# Patient Record
Sex: MALE | Race: WHITE | NOT HISPANIC OR LATINO | Employment: OTHER | URBAN - METROPOLITAN AREA
[De-identification: names, ages, dates, MRNs, and addresses within clinical notes are randomized per-mention and may not be internally consistent; named-entity substitution may affect disease eponyms.]

---

## 2021-03-22 ENCOUNTER — BULK ORDERING (OUTPATIENT)
Dept: CASE MANAGEMENT | Facility: OTHER | Age: 61
End: 2021-03-22

## 2021-03-22 DIAGNOSIS — Z23 IMMUNIZATION DUE: ICD-10-CM

## 2021-11-09 ENCOUNTER — OFFICE VISIT (OUTPATIENT)
Dept: FAMILY MEDICINE CLINIC | Facility: CLINIC | Age: 61
End: 2021-11-09

## 2021-11-09 VITALS
BODY MASS INDEX: 40.42 KG/M2 | HEIGHT: 72 IN | OXYGEN SATURATION: 96 % | SYSTOLIC BLOOD PRESSURE: 164 MMHG | TEMPERATURE: 99.5 F | HEART RATE: 93 BPM | DIASTOLIC BLOOD PRESSURE: 80 MMHG | WEIGHT: 298.4 LBS

## 2021-11-09 DIAGNOSIS — E78.5 HYPERLIPIDEMIA, UNSPECIFIED HYPERLIPIDEMIA TYPE: ICD-10-CM

## 2021-11-09 DIAGNOSIS — K63.5 POLYP OF COLON, UNSPECIFIED PART OF COLON, UNSPECIFIED TYPE: ICD-10-CM

## 2021-11-09 DIAGNOSIS — E11.65 UNCONTROLLED TYPE 2 DIABETES MELLITUS WITH HYPERGLYCEMIA (HCC): Primary | ICD-10-CM

## 2021-11-09 DIAGNOSIS — Z23 NEED FOR IMMUNIZATION AGAINST INFLUENZA: ICD-10-CM

## 2021-11-09 DIAGNOSIS — G89.29 CHRONIC LOW BACK PAIN, UNSPECIFIED BACK PAIN LATERALITY, UNSPECIFIED WHETHER SCIATICA PRESENT: ICD-10-CM

## 2021-11-09 DIAGNOSIS — M54.50 CHRONIC LOW BACK PAIN, UNSPECIFIED BACK PAIN LATERALITY, UNSPECIFIED WHETHER SCIATICA PRESENT: ICD-10-CM

## 2021-11-09 DIAGNOSIS — M10.9 GOUT, UNSPECIFIED CAUSE, UNSPECIFIED CHRONICITY, UNSPECIFIED SITE: ICD-10-CM

## 2021-11-09 DIAGNOSIS — Z11.59 NEED FOR HEPATITIS C SCREENING TEST: ICD-10-CM

## 2021-11-09 DIAGNOSIS — E66.01 CLASS 3 SEVERE OBESITY WITH SERIOUS COMORBIDITY AND BODY MASS INDEX (BMI) OF 40.0 TO 44.9 IN ADULT, UNSPECIFIED OBESITY TYPE (HCC): ICD-10-CM

## 2021-11-09 DIAGNOSIS — I10 PRIMARY HYPERTENSION: ICD-10-CM

## 2021-11-09 PROBLEM — M19.90 ARTHRITIS: Status: ACTIVE | Noted: 2021-11-09

## 2021-11-09 PROBLEM — K11.20 PAROTITIS: Status: ACTIVE | Noted: 2019-06-24

## 2021-11-09 PROBLEM — E66.9 OBESITY (BMI 30-39.9): Status: ACTIVE | Noted: 2021-11-09

## 2021-11-09 PROCEDURE — G0008 ADMIN INFLUENZA VIRUS VAC: HCPCS | Performed by: NURSE PRACTITIONER

## 2021-11-09 PROCEDURE — 90686 IIV4 VACC NO PRSV 0.5 ML IM: CPT | Performed by: NURSE PRACTITIONER

## 2021-11-09 PROCEDURE — 99204 OFFICE O/P NEW MOD 45 MIN: CPT | Performed by: NURSE PRACTITIONER

## 2021-11-09 RX ORDER — GLIMEPIRIDE 2 MG/1
2 TABLET ORAL
Qty: 180 TABLET | Refills: 1 | Status: SHIPPED | OUTPATIENT
Start: 2021-11-09 | End: 2022-06-28

## 2021-11-09 RX ORDER — ALLOPURINOL 300 MG/1
300 TABLET ORAL DAILY
COMMUNITY
Start: 2021-10-28 | End: 2021-11-09 | Stop reason: SDUPTHER

## 2021-11-09 RX ORDER — BENAZEPRIL HYDROCHLORIDE 20 MG/1
20 TABLET ORAL DAILY
COMMUNITY
End: 2021-11-09 | Stop reason: SDUPTHER

## 2021-11-09 RX ORDER — BENAZEPRIL HYDROCHLORIDE 20 MG/1
20 TABLET ORAL DAILY
Qty: 90 TABLET | Refills: 1 | Status: SHIPPED | OUTPATIENT
Start: 2021-11-09 | End: 2022-05-13

## 2021-11-09 RX ORDER — HYDROCHLOROTHIAZIDE 12.5 MG/1
12.5 CAPSULE, GELATIN COATED ORAL EVERY MORNING
Qty: 90 CAPSULE | Refills: 1 | Status: SHIPPED | OUTPATIENT
Start: 2021-11-09 | End: 2022-06-28

## 2021-11-09 RX ORDER — AMLODIPINE BESYLATE 10 MG/1
10 TABLET ORAL DAILY
Qty: 90 TABLET | Refills: 1 | Status: SHIPPED | OUTPATIENT
Start: 2021-11-09 | End: 2022-06-28

## 2021-11-09 RX ORDER — ALLOPURINOL 300 MG/1
300 TABLET ORAL DAILY
Qty: 90 TABLET | Refills: 1 | Status: SHIPPED | OUTPATIENT
Start: 2021-11-09 | End: 2022-06-28

## 2021-11-09 RX ORDER — NAPROXEN SODIUM 500 MG/1
500 TABLET, FILM COATED, EXTENDED RELEASE ORAL DAILY PRN
COMMUNITY
End: 2022-02-28 | Stop reason: SDUPTHER

## 2021-11-09 RX ORDER — CYCLOBENZAPRINE HCL 10 MG
10 TABLET ORAL 3 TIMES DAILY PRN
COMMUNITY
End: 2022-02-28 | Stop reason: SDUPTHER

## 2021-11-09 RX ORDER — SILDENAFIL 100 MG/1
100 TABLET, FILM COATED ORAL DAILY PRN
COMMUNITY
End: 2022-01-20 | Stop reason: SDUPTHER

## 2021-11-09 RX ORDER — MULTIPLE VITAMINS W/ MINERALS TAB 9MG-400MCG
1 TAB ORAL DAILY
COMMUNITY

## 2021-11-09 RX ORDER — HYDROCHLOROTHIAZIDE 12.5 MG/1
12.5 CAPSULE, GELATIN COATED ORAL EVERY MORNING
COMMUNITY
Start: 2021-09-21 | End: 2021-11-09 | Stop reason: SDUPTHER

## 2021-11-09 RX ORDER — AMLODIPINE BESYLATE 10 MG/1
10 TABLET ORAL DAILY
COMMUNITY
Start: 2021-09-21 | End: 2021-11-09 | Stop reason: SDUPTHER

## 2021-11-09 RX ORDER — GLIMEPIRIDE 2 MG/1
2 TABLET ORAL
COMMUNITY
Start: 2021-09-05 | End: 2021-11-09 | Stop reason: SDUPTHER

## 2021-11-09 RX ORDER — ASPIRIN 81 MG/1
81 TABLET ORAL DAILY
COMMUNITY

## 2021-11-09 NOTE — PATIENT INSTRUCTIONS
Resume Benazepril daily.  Continue to work on healthy diet and exercise.  Follow up pending lab results.  Follow up in 1 month for Medicare Wellness, or sooner if problems or concerns.

## 2021-11-09 NOTE — PROGRESS NOTES
Subjective   Chad Davidson Jr. is a 61 y.o. male.     Chief Complaint   Patient presents with   • Establish Care     new pt to [facility]   • Hypertension   • Diabetes       History of Present Illness   New patient, here to establish care; previous PCP retired; lives in [place].    F/U HTN: takes Amlodipine, Benazepril, and HCTZ daily; has been out of Benazepril for about 1 month; does not monitor BP; no headaches; no orthostasis; no swelling for most part, mild if has been on feet all day, resolved next morning.    F/U DM2: takes Metformin twice daily and Glimepiride twice daily; no recent A1c; will eat and then fast until blood sugar gets down to 150 before eats ago; watches intake of carbs/sugars some; active on farm, takes care of cows; has exercise bike, but has not used recently; mild numbness/tingling in feet; last eye exam about 3 years ago.     F/U gout: takes Allopurinol daily; no recent gout flares.    F/U lower back pain: takes Naproxen daily and Flexeril as needed for occasional back or neck pain and help; history of disc removed in past; no bladder or bowel dysfunction.    F/U ED: takes Viagra as needed; needs refill.    The following portions of the patient's history were reviewed and updated as appropriate: allergies, current medications, past family history, past medical history, past social history, past surgical history and problem list.      Current Outpatient Medications   Medication Sig Dispense Refill   • allopurinol (ZYLOPRIM) 300 MG tablet Take 1 tablet by mouth Daily. 90 tablet 1   • amLODIPine (NORVASC) 10 MG tablet Take 1 tablet by mouth Daily. 90 tablet 1   • aspirin (ASPIR) 81 MG EC tablet Take 81 mg by mouth Daily.     • benazepril (LOTENSIN) 20 MG tablet Take 1 tablet by mouth Daily. 90 tablet 1   • cyclobenzaprine (FLEXERIL) 10 MG tablet Take 10 mg by mouth 3 (Three) Times a Day As Needed for Muscle Spasms.     • glimepiride (AMARYL) 2 MG tablet Take 1 tablet by mouth 2 (Two)  Times a Day Before Meals. 180 tablet 1   • hydroCHLOROthiazide (MICROZIDE) 12.5 MG capsule Take 1 capsule by mouth Every Morning. 90 capsule 1   • metFORMIN (GLUCOPHAGE) 500 MG tablet Take 2 tablets by mouth 2 (Two) Times a Day With Meals. 360 tablet 1   • multivitamin with minerals tablet tablet Take 1 tablet by mouth Daily.     • naproxen (NAPRELAN) 500 MG 24 hr tablet Take 500 mg by mouth Daily As Needed for Mild Pain .     • sildenafil (VIAGRA) 100 MG tablet Take 100 mg by mouth Daily As Needed for Erectile Dysfunction.       No current facility-administered medications for this visit.       Past Medical History:   Diagnosis Date   • Diabetes mellitus (HCC)    • Hypertension    • Low back pain    • Parotitis 2019       Past Surgical History:   Procedure Laterality Date   • BACK SURGERY     • COLONOSCOPY  approx     2 polyps removed   • REPAIR PATELLAR TENDON Right    • REPLACEMENT TOTAL KNEE Bilateral        Family History   Problem Relation Age of Onset   • Diabetes Father    • Alcohol abuse Father    • Hepatitis Father    • Diabetes Sister        Social History     Socioeconomic History   • Marital status:    Tobacco Use   • Smoking status: Former Smoker     Packs/day: 2.50     Years: 20.00     Pack years: 50.00     Types: Cigarettes     Quit date:      Years since quittin.8   • Smokeless tobacco: Never Used   Vaping Use   • Vaping Use: Never used   Substance and Sexual Activity   • Alcohol use: Not Currently     Comment: no ETOH in last 5 years   • Drug use: Never   • Sexual activity: Yes       Review of Systems   Constitutional: Negative for appetite change, chills, fatigue, fever, unexpected weight gain and unexpected weight loss.   HENT: Negative for ear pain, sinus pressure, sore throat and trouble swallowing.    Eyes: Negative for blurred vision.   Respiratory: Negative for cough, chest tightness and shortness of breath.    Cardiovascular: Negative for chest pain and  "palpitations.   Gastrointestinal: Negative for abdominal pain (has hernia in abdomen), blood in stool (rare with hemorrhoid), constipation, diarrhea, GERD and indigestion.   Endocrine: Negative for cold intolerance, heat intolerance and polydipsia (rarely; drinks a lot of water).   Genitourinary: Negative for dysuria and frequency.   Musculoskeletal: Back pain: see HPI.   Skin: Negative for rash.   Neurological: Negative for syncope and weakness.   Hematological: Does not bruise/bleed easily.   Psychiatric/Behavioral: Negative for depressed mood. The patient is not nervous/anxious.        Objective   Vitals:    11/09/21 1512   BP: 164/80   Pulse: 93   Temp: 99.5 °F (37.5 °C)   TempSrc: Infrared   SpO2: 96%   Weight: 135 kg (298 lb 6.4 oz)   Height: 182.9 cm (72\")   PainSc: 0-No pain     Body mass index is 40.47 kg/m².    Physical Exam  Vitals and nursing note reviewed.   Constitutional:       General: He is not in acute distress.     Appearance: He is well-developed and well-groomed. He is not diaphoretic.   HENT:      Head: Normocephalic.      Right Ear: External ear normal.      Left Ear: External ear normal.   Eyes:      Conjunctiva/sclera: Conjunctivae normal.   Neck:      Vascular: No carotid bruit.   Cardiovascular:      Rate and Rhythm: Normal rate and regular rhythm.      Pulses: Normal pulses.           Dorsalis pedis pulses are 2+ on the right side and 2+ on the left side.        Posterior tibial pulses are 2+ on the right side and 2+ on the left side.      Heart sounds: Normal heart sounds. No murmur heard.      Pulmonary:      Effort: Pulmonary effort is normal. No respiratory distress.      Breath sounds: Normal breath sounds.   Abdominal:      General: Bowel sounds are normal.      Palpations: Abdomen is soft. There is no hepatomegaly or splenomegaly.      Tenderness: There is no abdominal tenderness. There is no guarding.      Hernia: A hernia is present. Hernia is present in the umbilical area " (reducible).   Musculoskeletal:      Cervical back: Normal range of motion and neck supple.      Right lower leg: No edema.      Left lower leg: No edema.      Right foot: Normal range of motion.      Left foot: Normal range of motion.        Feet:    Feet:      Right foot:      Protective Sensation: 10 sites tested. 10 sites sensed.      Skin integrity: Skin integrity normal.      Left foot:      Protective Sensation: 10 sites tested. 10 sites sensed.      Skin integrity: Skin integrity normal.      Comments: Diabetic Foot Exam Performed and Monofilament Test Performed    Skin:     General: Skin is warm and dry.      Findings: No rash.   Neurological:      Mental Status: He is alert and oriented to person, place, and time.      Gait: Gait is intact.   Psychiatric:         Mood and Affect: Mood normal.         Behavior: Behavior normal.         Thought Content: Thought content normal.         Cognition and Memory: Cognition normal.         Judgment: Judgment normal.         Lab Results   Component Value Date    WBC 9.06 06/24/2019    RBC 5.03 06/24/2019    HGB 15.4 06/24/2019    HCT 45.4 06/24/2019    MCV 90.3 06/24/2019    MCH 30.6 06/24/2019    MCHC 33.9 06/24/2019    RDW 13.3 06/24/2019    MPV 10.1 06/24/2019     06/24/2019    NEUTRORELPCT 68.4 06/24/2019    LYMPHORELPCT 17.4 06/24/2019    MONORELPCT 9.1 06/24/2019    EOSRELPCT 4.7 06/24/2019    BASORELPCT 0.2 06/24/2019    AUTOIGPER 0.2 (H) 06/24/2019    NEUTROABS 6.19 06/24/2019    LYMPHSABS 1.58 06/24/2019    MONOSABS 0.82 06/24/2019    EOSABS 0.43 06/24/2019    BASOSABS 0.02 06/24/2019    AUTOIGNUM 0.02 06/24/2019    NRBC 0 06/24/2019     Lab Results   Component Value Date    GLUCOSE 222 (H) 11/24/2015    BUN 16 06/24/2019    CREATININE 0.6 (L) 06/24/2019    BCR 26.7 06/24/2019    K 4.4 06/24/2019    CO2 21 (L) 06/24/2019    CALCIUM 9.0 06/24/2019    ALBUMIN 4.6 10/23/2015    AST 28 10/23/2015    ALT 44 (H) 10/23/2015      Lab Results   Component Value  Date    HGBA1C 9.3 (H) 06/24/2019         Assessment/Plan .  Problem List Items Addressed This Visit     Chronic low back pain    Current Assessment & Plan     Continue Naproxen daily as needed.  Continue Flexeril as needed.         Gout    Current Assessment & Plan     Stable on Allopurinol daily.         Relevant Medications    allopurinol (ZYLOPRIM) 300 MG tablet    Hyperlipidemia    Relevant Orders    Comprehensive Metabolic Panel    Lipid Panel With LDL / HDL Ratio    Hypertension    Current Assessment & Plan     Hypertension is increased today since has been out of Benazepril.  Regular aerobic exercise.  Continue current medications.  Ambulatory blood pressure monitoring.  Blood pressure will be reassessed in 4 weeks.  Resume Benazepril daily.  Continue Amlodipine and HCTZ daily.         Relevant Medications    amLODIPine (NORVASC) 10 MG tablet    benazepril (LOTENSIN) 20 MG tablet    hydroCHLOROthiazide (MICROZIDE) 12.5 MG capsule    Other Relevant Orders    Comprehensive Metabolic Panel    Lipid Panel With LDL / HDL Ratio    CBC & Differential    Class 3 severe obesity with serious comorbidity and body mass index (BMI) of 40.0 to 44.9 in adult (HCC)    Current Assessment & Plan     Patient's (Body mass index is 40.47 kg/m².) indicates that they are morbidly obese (BMI > 40 or > 35 with obesity - related health condition) with health conditions that include hypertension, diabetes mellitus and dyslipidemias . Weight is newly identified. BMI is is above average; BMI management plan is completed. We discussed portion control and increasing exercise.          Uncontrolled type 2 diabetes mellitus with hyperglycemia (HCC) - Primary    Current Assessment & Plan     Diabetes is likely not well controlled per patient.   Continue current treatment regimen.  Regular aerobic exercise.  Discussed ways to avoid symptomatic hypoglycemia.  Discussed foot care.  Reminded to get yearly retinal exam.  Diabetes will be  reassessed in 1 month.  Continue Metformin and Glimepiride twice daily.         Relevant Medications    glimepiride (AMARYL) 2 MG tablet    metFORMIN (GLUCOPHAGE) 500 MG tablet    Other Relevant Orders    Hemoglobin A1c    Comprehensive Metabolic Panel    Lipid Panel With LDL / HDL Ratio      Other Visit Diagnoses     Need for immunization against influenza        Relevant Orders    FluLaval/Fluarix/Fluzone >6 Months (1290-4919) (Completed)    Need for hepatitis C screening test        Relevant Orders    Hepatitis C Antibody    Polyp of colon, unspecified part of colon, unspecified type        Relevant Orders    Ambulatory Referral to Gastroenterology          Return in about 1 month (around 12/9/2021) for Medicare Wellness, Recheck.or sooner if problems or concerns.       COVID-19 Precautions - Patient was compliant in wearing a mask. When I saw the patient, I used appropriate personal protective equipment (PPE) including mask, gloves, and eye shield (standard procedure).  Hand hygiene was completed before and after seeing the patient.

## 2021-11-10 PROBLEM — E66.813 CLASS 3 SEVERE OBESITY WITH SERIOUS COMORBIDITY AND BODY MASS INDEX (BMI) OF 40.0 TO 44.9 IN ADULT: Status: ACTIVE | Noted: 2021-11-09

## 2021-11-10 PROBLEM — E66.01 CLASS 3 SEVERE OBESITY WITH SERIOUS COMORBIDITY AND BODY MASS INDEX (BMI) OF 40.0 TO 44.9 IN ADULT (HCC): Status: ACTIVE | Noted: 2021-11-09

## 2021-11-10 PROBLEM — K11.20 PAROTITIS: Status: RESOLVED | Noted: 2019-06-24 | Resolved: 2021-11-10

## 2021-11-10 LAB
ALBUMIN SERPL-MCNC: 4.7 G/DL (ref 3.8–4.8)
ALBUMIN/GLOB SERPL: 2.2 {RATIO} (ref 1.2–2.2)
ALP SERPL-CCNC: 90 IU/L (ref 44–121)
ALT SERPL-CCNC: 34 IU/L (ref 0–44)
AST SERPL-CCNC: 21 IU/L (ref 0–40)
BASOPHILS # BLD AUTO: 0.1 X10E3/UL (ref 0–0.2)
BASOPHILS NFR BLD AUTO: 1 %
BILIRUB SERPL-MCNC: 0.9 MG/DL (ref 0–1.2)
BUN SERPL-MCNC: 9 MG/DL (ref 8–27)
BUN/CREAT SERPL: 11 (ref 10–24)
CALCIUM SERPL-MCNC: 9.7 MG/DL (ref 8.6–10.2)
CHLORIDE SERPL-SCNC: 105 MMOL/L (ref 96–106)
CHOLEST SERPL-MCNC: 166 MG/DL (ref 100–199)
CO2 SERPL-SCNC: 23 MMOL/L (ref 20–29)
CREAT SERPL-MCNC: 0.79 MG/DL (ref 0.76–1.27)
EOSINOPHIL # BLD AUTO: 0.4 X10E3/UL (ref 0–0.4)
EOSINOPHIL NFR BLD AUTO: 7 %
ERYTHROCYTE [DISTWIDTH] IN BLOOD BY AUTOMATED COUNT: 13 % (ref 11.6–15.4)
GLOBULIN SER CALC-MCNC: 2.1 G/DL (ref 1.5–4.5)
GLUCOSE SERPL-MCNC: 169 MG/DL (ref 65–99)
HBA1C MFR BLD: 9.4 % (ref 4.8–5.6)
HCT VFR BLD AUTO: 47.5 % (ref 37.5–51)
HCV AB S/CO SERPL IA: <0.1 S/CO RATIO (ref 0–0.9)
HDLC SERPL-MCNC: 37 MG/DL
HGB BLD-MCNC: 16.5 G/DL (ref 13–17.7)
IMM GRANULOCYTES # BLD AUTO: 0 X10E3/UL (ref 0–0.1)
IMM GRANULOCYTES NFR BLD AUTO: 1 %
LDLC SERPL CALC-MCNC: 94 MG/DL (ref 0–99)
LDLC/HDLC SERPL: 2.5 RATIO (ref 0–3.6)
LYMPHOCYTES # BLD AUTO: 2 X10E3/UL (ref 0.7–3.1)
LYMPHOCYTES NFR BLD AUTO: 31 %
MCH RBC QN AUTO: 31.2 PG (ref 26.6–33)
MCHC RBC AUTO-ENTMCNC: 34.7 G/DL (ref 31.5–35.7)
MCV RBC AUTO: 90 FL (ref 79–97)
MONOCYTES # BLD AUTO: 0.5 X10E3/UL (ref 0.1–0.9)
MONOCYTES NFR BLD AUTO: 7 %
NEUTROPHILS # BLD AUTO: 3.4 X10E3/UL (ref 1.4–7)
NEUTROPHILS NFR BLD AUTO: 53 %
PLATELET # BLD AUTO: 391 X10E3/UL (ref 150–450)
POTASSIUM SERPL-SCNC: 4.5 MMOL/L (ref 3.5–5.2)
PROT SERPL-MCNC: 6.8 G/DL (ref 6–8.5)
RBC # BLD AUTO: 5.29 X10E6/UL (ref 4.14–5.8)
SODIUM SERPL-SCNC: 140 MMOL/L (ref 134–144)
TRIGL SERPL-MCNC: 201 MG/DL (ref 0–149)
VLDLC SERPL CALC-MCNC: 35 MG/DL (ref 5–40)
WBC # BLD AUTO: 6.4 X10E3/UL (ref 3.4–10.8)

## 2021-11-10 NOTE — ASSESSMENT & PLAN NOTE
Patient's (Body mass index is 40.47 kg/m².) indicates that they are morbidly obese (BMI > 40 or > 35 with obesity - related health condition) with health conditions that include hypertension, diabetes mellitus and dyslipidemias . Weight is newly identified. BMI is is above average; BMI management plan is completed. We discussed portion control and increasing exercise.

## 2021-11-10 NOTE — ASSESSMENT & PLAN NOTE
Diabetes is likely not well controlled per patient.   Continue current treatment regimen.  Regular aerobic exercise.  Discussed ways to avoid symptomatic hypoglycemia.  Discussed foot care.  Reminded to get yearly retinal exam.  Diabetes will be reassessed in 1 month.  Continue Metformin and Glimepiride twice daily.

## 2021-11-10 NOTE — ASSESSMENT & PLAN NOTE
Hypertension is increased today since has been out of Benazepril.  Regular aerobic exercise.  Continue current medications.  Ambulatory blood pressure monitoring.  Blood pressure will be reassessed in 4 weeks.  Resume Benazepril daily.  Continue Amlodipine and HCTZ daily.

## 2021-12-01 ENCOUNTER — IMMUNIZATION (OUTPATIENT)
Dept: VACCINE CLINIC | Facility: HOSPITAL | Age: 61
End: 2021-12-01

## 2021-12-01 PROCEDURE — 91300 HC SARSCOV02 VAC 30MCG/0.3ML IM: CPT | Performed by: INTERNAL MEDICINE

## 2021-12-01 PROCEDURE — 0004A HC ADM SARSCOV2 30MCG/0.3ML BOOSTER: CPT | Performed by: INTERNAL MEDICINE

## 2022-02-28 ENCOUNTER — OFFICE VISIT (OUTPATIENT)
Dept: FAMILY MEDICINE CLINIC | Facility: CLINIC | Age: 62
End: 2022-02-28

## 2022-02-28 VITALS
OXYGEN SATURATION: 99 % | WEIGHT: 299.8 LBS | HEIGHT: 72 IN | BODY MASS INDEX: 40.61 KG/M2 | TEMPERATURE: 97.3 F | HEART RATE: 94 BPM | SYSTOLIC BLOOD PRESSURE: 146 MMHG | DIASTOLIC BLOOD PRESSURE: 80 MMHG

## 2022-02-28 DIAGNOSIS — Z23 ENCOUNTER FOR IMMUNIZATION: ICD-10-CM

## 2022-02-28 DIAGNOSIS — L72.3 SEBACEOUS CYST: ICD-10-CM

## 2022-02-28 DIAGNOSIS — M54.50 CHRONIC LOW BACK PAIN, UNSPECIFIED BACK PAIN LATERALITY, UNSPECIFIED WHETHER SCIATICA PRESENT: ICD-10-CM

## 2022-02-28 DIAGNOSIS — E11.65 UNCONTROLLED TYPE 2 DIABETES MELLITUS WITH HYPERGLYCEMIA: ICD-10-CM

## 2022-02-28 DIAGNOSIS — Z12.5 PROSTATE CANCER SCREENING: ICD-10-CM

## 2022-02-28 DIAGNOSIS — E66.01 CLASS 3 SEVERE OBESITY WITH SERIOUS COMORBIDITY AND BODY MASS INDEX (BMI) OF 40.0 TO 44.9 IN ADULT, UNSPECIFIED OBESITY TYPE: ICD-10-CM

## 2022-02-28 DIAGNOSIS — M10.9 GOUT, UNSPECIFIED CAUSE, UNSPECIFIED CHRONICITY, UNSPECIFIED SITE: ICD-10-CM

## 2022-02-28 DIAGNOSIS — R06.83 SNORING: ICD-10-CM

## 2022-02-28 DIAGNOSIS — M19.90 ARTHRITIS: ICD-10-CM

## 2022-02-28 DIAGNOSIS — G89.29 CHRONIC LOW BACK PAIN, UNSPECIFIED BACK PAIN LATERALITY, UNSPECIFIED WHETHER SCIATICA PRESENT: ICD-10-CM

## 2022-02-28 DIAGNOSIS — I10 PRIMARY HYPERTENSION: ICD-10-CM

## 2022-02-28 DIAGNOSIS — K63.5 POLYP OF COLON, UNSPECIFIED PART OF COLON, UNSPECIFIED TYPE: ICD-10-CM

## 2022-02-28 DIAGNOSIS — K42.9 UMBILICAL HERNIA WITHOUT OBSTRUCTION AND WITHOUT GANGRENE: ICD-10-CM

## 2022-02-28 DIAGNOSIS — Z00.00 MEDICARE ANNUAL WELLNESS VISIT, INITIAL: Primary | ICD-10-CM

## 2022-02-28 PROCEDURE — 1170F FXNL STATUS ASSESSED: CPT | Performed by: NURSE PRACTITIONER

## 2022-02-28 PROCEDURE — G0009 ADMIN PNEUMOCOCCAL VACCINE: HCPCS | Performed by: NURSE PRACTITIONER

## 2022-02-28 PROCEDURE — 99214 OFFICE O/P EST MOD 30 MIN: CPT | Performed by: NURSE PRACTITIONER

## 2022-02-28 PROCEDURE — G0438 PPPS, INITIAL VISIT: HCPCS | Performed by: NURSE PRACTITIONER

## 2022-02-28 PROCEDURE — 1159F MED LIST DOCD IN RCRD: CPT | Performed by: NURSE PRACTITIONER

## 2022-02-28 PROCEDURE — 90732 PPSV23 VACC 2 YRS+ SUBQ/IM: CPT | Performed by: NURSE PRACTITIONER

## 2022-02-28 RX ORDER — NAPROXEN SODIUM 500 MG/1
500 TABLET, FILM COATED, EXTENDED RELEASE ORAL DAILY PRN
Qty: 90 TABLET | Refills: 0 | Status: SHIPPED | OUTPATIENT
Start: 2022-02-28 | End: 2022-11-02 | Stop reason: SINTOL

## 2022-02-28 RX ORDER — CYCLOBENZAPRINE HCL 10 MG
10 TABLET ORAL 3 TIMES DAILY PRN
Qty: 30 TABLET | Refills: 0 | Status: SHIPPED | OUTPATIENT
Start: 2022-02-28 | End: 2022-10-07

## 2022-02-28 NOTE — PROGRESS NOTES
The ABCs of the Annual Wellness Visit  Initial Medicare Wellness Visit    Chief Complaint   Patient presents with   • Annual Exam     mcwe      Subjective   History of Present Illness:  Chad Davidson Jr. is a 61 y.o. male who presents for an Initial Medicare Wellness Visit.  In addition, we addressed the following health issues:  F/U DM2: takes Metformin and Glimperide daily; last A1c 9.4%; monitors blood sugar, typically runs about 130-170s; will avoid eating if blood sugar is increased; blood sugar is improved if has been active walking; has been on Farxiga in past and worked well, but too expensive; no numbness or tingling; last eye exam about 3-4 years ago, needs new glasses.    F/U HTN: takes Amlodipine, Benazepril, and HCTZ daily; does not typically monitor BP; no headaches; no orthostasis; no swelling.    F/U gout: takes Allopurinol daily; no gout flares for long time.    F/U lower back pain: takes Naproxen daily and Flexeril as needed and helps; takes Flexeril rarely; no radiation of pain down legs; had back surgery in past; no weakness; no bladder or bowel dysfunction; Naproxen helps lower back and also helps hands; has arthritis in hands; has had bilateral knee surgery; works on a farm; needs refill of Naproxen and Flexeril.    Had loss of taste around Thanksgiving; no SOA, but felt sore all over; thinks may have had COVID-19 virus, but was never tested; symptoms resolved over 1-2 weeks.    Currently on disability for problems with legs.    The following portions of the patient's history were reviewed and   updated as appropriate: allergies, current medications, past family history, past medical history, past social history, past surgical history and problem list.     Compared to one year ago, the patient feels his physical   health is better.    Compared to one year ago, the patient feels his mental   health is the same.    Recent Hospitalizations:  He was not admitted to the hospital during the last  year.       Current Medical Providers:  Patient Care Team:  Eri David APRN as PCP - General (Family Medicine)    Outpatient Medications Prior to Visit   Medication Sig Dispense Refill   • allopurinol (ZYLOPRIM) 300 MG tablet Take 1 tablet by mouth Daily. 90 tablet 1   • amLODIPine (NORVASC) 10 MG tablet Take 1 tablet by mouth Daily. 90 tablet 1   • aspirin (ASPIR) 81 MG EC tablet Take 81 mg by mouth Daily.     • benazepril (LOTENSIN) 20 MG tablet Take 1 tablet by mouth Daily. 90 tablet 1   • glimepiride (AMARYL) 2 MG tablet Take 1 tablet by mouth 2 (Two) Times a Day Before Meals. 180 tablet 1   • hydroCHLOROthiazide (MICROZIDE) 12.5 MG capsule Take 1 capsule by mouth Every Morning. 90 capsule 1   • metFORMIN (GLUCOPHAGE) 500 MG tablet Take 2 tablets by mouth 2 (Two) Times a Day With Meals. 360 tablet 1   • multivitamin with minerals tablet tablet Take 1 tablet by mouth Daily.     • sildenafil (VIAGRA) 100 MG tablet Take 1 tablet by mouth Daily As Needed for Erectile Dysfunction. 20 tablet 0   • cyclobenzaprine (FLEXERIL) 10 MG tablet Take 10 mg by mouth 3 (Three) Times a Day As Needed for Muscle Spasms.     • naproxen (NAPRELAN) 500 MG 24 hr tablet Take 500 mg by mouth Daily As Needed for Mild Pain .       No facility-administered medications prior to visit.       No opioid medication identified on active medication list. I have reviewed chart for other potential  high risk medication/s and harmful drug interactions in the elderly.          Aspirin is on active medication list. Aspirin use is indicated based on review of current medical condition/s. Pros and cons of this therapy have been discussed today. Benefits of this medication outweigh potential harm.  Patient has been encouraged to continue taking this medication.  .      Patient Active Problem List   Diagnosis   • Arthritis   • Chronic low back pain   • Gout   • Hyperlipidemia   • Hypertension   • Class 3 severe obesity with serious comorbidity and  "body mass index (BMI) of 40.0 to 44.9 in adult ([facility])   • Uncontrolled type 2 diabetes mellitus with hyperglycemia ([facility])     Advance Care Planning  Advance Directive is not on file.  ACP discussion was held with the patient during this visit. Patient does not have an advance directive, information provided.    Review of Systems   Constitutional: Negative for appetite change, chills, fatigue and fever.   HENT: Negative for ear pain, sinus pressure, sore throat and trouble swallowing.    Eyes: Negative for discharge and visual disturbance.   Respiratory: Negative for cough, chest tightness and shortness of breath. Apnea: does snore.    Cardiovascular: Negative for chest pain (has had discomfort in right side of chest with pulling self up into deer stand, has resolved at this point) and palpitations.   Gastrointestinal: Negative for abdominal pain, blood in stool, constipation and diarrhea.   Endocrine: Negative for cold intolerance and heat intolerance. Polydipsia: drinks a lot of water anyway, not sure.   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 Negative for dysuria.   Musculoskeletal: Arthralgias: hands and knees; some in bilateral shoulders. Back pain: see HPI.   Skin: Negative for rash.   Neurological: Negative for syncope.   Hematological: Does not bruise/bleed easily.   Psychiatric/Behavioral: Negative for dysphoric mood. The patient is not nervous/anxious.         Objective       Vitals:    02/28/22 1041 02/28/22 1133   BP: 160/100 146/80   BP Location: Left arm Left arm   Patient Position: Sitting Sitting   Cuff Size: Adult    Pulse: 94    Temp: 97.3 °F (36.3 °C)    SpO2: 99%    Weight: 136 kg (299 lb 12.8 oz)    Height: 182.9 cm (72\")      BMI Readings from Last 1 Encounters:   02/28/22 40.66 kg/m²   BMI is above normal parameters. Recommendations include: exercise counseling and nutrition " counseling    Has not taken BP medications yet today.    Does the patient have evidence of cognitive impairment? No    Physical Exam  Vitals and nursing note reviewed.   Constitutional:       General: He is not in acute distress.     Appearance: He is well-developed and well-groomed. He is not ill-appearing or diaphoretic.   HENT:      Head: Normocephalic and atraumatic.      Jaw: No tenderness or pain on movement.      Right Ear: Tympanic membrane and external ear normal. No decreased hearing noted.      Left Ear: Tympanic membrane and external ear normal. No decreased hearing noted.      Nose: Nose normal.      Right Sinus: No maxillary sinus tenderness or frontal sinus tenderness.      Left Sinus: No maxillary sinus tenderness or frontal sinus tenderness.      Mouth/Throat:      Mouth: Mucous membranes are moist.      Pharynx: Uvula swelling present. No oropharyngeal exudate or posterior oropharyngeal erythema.   Eyes:      Extraocular Movements: Extraocular movements intact.      Conjunctiva/sclera: Conjunctivae normal.      Pupils: Pupils are equal, round, and reactive to light.   Neck:      Thyroid: No thyromegaly.      Vascular: No carotid bruit.      Trachea: No tracheal deviation.   Cardiovascular:      Rate and Rhythm: Normal rate and regular rhythm.      Pulses: Normal pulses.      Heart sounds: Normal heart sounds. No murmur heard.      Pulmonary:      Effort: Pulmonary effort is normal. No respiratory distress.      Breath sounds: Normal breath sounds.   Chest:      Chest wall: No tenderness.   Abdominal:      General: Bowel sounds are normal.      Palpations: Abdomen is soft. There is no hepatomegaly or splenomegaly.      Tenderness: There is no abdominal tenderness. There is no guarding.      Hernia: A hernia is present. Hernia is present in the umbilical area (reducible).   Musculoskeletal:         General: Normal range of motion.      Cervical back: Normal range of motion and neck supple. No bony  tenderness.      Thoracic back: No bony tenderness.      Lumbar back: No bony tenderness. Negative right straight leg raise test and negative left straight leg raise test.        Back:       Right hip: Normal range of motion.      Left hip: Normal range of motion.      Right lower leg: No edema.      Left lower leg: No edema.   Lymphadenopathy:      Cervical: No cervical adenopathy.   Skin:     General: Skin is warm and dry.      Findings: No rash.   Neurological:      Mental Status: He is alert and oriented to person, place, and time.      Cranial Nerves: No cranial nerve deficit.      Motor: Motor function is intact.      Coordination: Coordination normal.      Gait: Gait normal.      Deep Tendon Reflexes: Reflexes are normal and symmetric.   Psychiatric:         Mood and Affect: Mood normal.         Behavior: Behavior normal.         Thought Content: Thought content normal.         Cognition and Memory: Cognition normal.         Judgment: Judgment normal.               HEALTH RISK ASSESSMENT    Smoking Status:  Social History     Tobacco Use   Smoking Status Former Smoker   • Packs/day: 2.50   • Years: 20.00   • Pack years: 50.00   • Types: Cigarettes   • Quit date:    • Years since quittin.1   Smokeless Tobacco Never Used     Alcohol Consumption:  Social History     Substance and Sexual Activity   Alcohol Use Not Currently   • Alcohol/week: 0.0 standard drinks    Comment: no ETOH in last 5 years     Fall Risk Screen:    [facility] Fall Risk Assessment was completed, and patient is at LOW risk for falls.Assessment completed on:2022    Depression Screen:   PHQ-2/PHQ-9 Depression Screening 2022   Little interest or pleasure in doing things 0   Feeling down, depressed, or hopeless 0   Total Score 0       Health Habits and Functional and Cognitive Screening:  Functional & Cognitive Status 2022   Do you have difficulty preparing food and eating? No   Do you have difficulty bathing yourself, getting  dressed or grooming yourself? No   Do you have difficulty using the toilet? No   Do you have difficulty moving around from place to place? No   Do you have trouble with steps or getting out of a bed or a chair? No   Current Diet Limited Junk Food   Dental Exam Up to date   Eye Exam Not up to date   Exercise (times per week) 7 times per week   Current Exercises Include Walking   Do you need help using the phone?  No   Are you deaf or do you have serious difficulty hearing?  No   Do you need help with transportation? No   Do you need help shopping? No   Do you need help preparing meals?  No   Do you need help with housework?  No   Do you need help with laundry? No   Do you need help taking your medications? No   Do you need help managing money? No   Do you ever drive or ride in a car without wearing a seat belt? No   Have you felt unusual stress, anger or loneliness in the last month? No   Who do you live with? Alone   If you need help, do you have trouble finding someone available to you? No   Have you been bothered in the last four weeks by sexual problems? No   Do you have difficulty concentrating, remembering or making decisions? No       Age-appropriate Screening Schedule:  Refer to the list below for future screening recommendations based on patient's age, sex and/or medical conditions. Orders for these recommended tests are listed in the plan section. The patient has been provided with a written plan.    Health Maintenance   Topic Date Due   • URINE MICROALBUMIN  Never done   • ZOSTER VACCINE (1 of 2) Never done   • DIABETIC EYE EXAM  Never done   • HEMOGLOBIN A1C  05/09/2022   • DIABETIC FOOT EXAM  11/09/2022   • LIPID PANEL  11/09/2022   • TDAP/TD VACCINES (2 - Td or Tdap) 01/02/2023   • INFLUENZA VACCINE  Completed     Will consider Shingrix at pharmacy.       Thinks had Tdap in 8/29/2018      Assessment/Plan   CMS Preventative Services Quick Reference  Risk Factors Identified During Encounter  Immunizations  Discussed/Encouraged (specific Immunizations; Pneumococcal 23 and Shingrix  The above risks/problems have been discussed with the patient.    Discussed low risk for falls and recommended avoiding throw rugs, installing grab bars in bathroom, making sure have handrails on steps, etc.    Follow up actions/plans if indicated are seen below in the Assessment/Plan Section.  Pertinent information has been shared with the patient in the After Visit Summary.    Diagnoses and all orders for this visit:    1. Medicare annual wellness visit, initial (Primary)  -     Basic Metabolic Panel  -     Hemoglobin A1c    2. Uncontrolled type 2 diabetes mellitus with hyperglycemia (HCC)  Assessment & Plan:  Diabetes is pending lab results.   Continue current treatment regimen.  Regular aerobic exercise.  Reminded to get yearly retinal exam.  Diabetes will be reassessed in 3 months.  Continue Metformin and Glimepiride daily.    Orders:  -     Basic Metabolic Panel  -     Hemoglobin A1c  -     Microalbumin / Creatinine Urine Ratio - Urine, Clean Catch    3. Primary hypertension  Assessment & Plan:  Hypertension is improving with treatment.  Weight loss.  Regular aerobic exercise.  Continue current medications.  Ambulatory blood pressure monitoring.  Blood pressure will be reassessed in 3 months.  Continue Amlodipine, Benazepril, and HCTZ daily    Orders:  -     Basic Metabolic Panel    4. Chronic low back pain, unspecified back pain laterality, unspecified whether sciatica present  Assessment & Plan:  Continue Naproxen daily as needed and Flexeril as needed.    Orders:  -     naproxen (NAPRELAN) 500 MG 24 hr tablet; Take 1 tablet by mouth Daily As Needed for Mild Pain .  Dispense: 90 tablet; Refill: 0  -     cyclobenzaprine (FLEXERIL) 10 MG tablet; Take 1 tablet by mouth 3 (Three) Times a Day As Needed for Muscle Spasms.  Dispense: 30 tablet; Refill: 0    5. Arthritis  Assessment & Plan:  Continue Naproxen daily as needed.    Orders:  -      naproxen (NAPRELAN) 500 MG 24 hr tablet; Take 1 tablet by mouth Daily As Needed for Mild Pain .  Dispense: 90 tablet; Refill: 0    6. Gout, unspecified cause, unspecified chronicity, unspecified site  Assessment & Plan:  Stable on Allopurinol daily.    Orders:  -     Uric Acid    7. Prostate cancer screening  -     PSA Screen    8. Polyp of colon, unspecified part of colon, unspecified type  -     Ambulatory Referral to Gastroenterology    9. Snoring  -     Ambulatory Referral to Sleep Medicine    10. Encounter for immunization  -     Pneumococcal Polysaccharide Vaccine 23-Valent Greater Than or Equal To 3yo Subcutaneous / IM    11. Class 3 severe obesity with serious comorbidity and body mass index (BMI) of 40.0 to 44.9 in adult, unspecified obesity type (HCC)  Assessment & Plan:  Patient's (Body mass index is 40.66 kg/m².) indicates that they are morbidly obese (BMI > 40 or > 35 with obesity - related health condition) with health conditions that include hypertension and diabetes mellitus . Weight is unchanged. BMI is is above average; BMI management plan is completed. We discussed portion control and increasing exercise.       12. Umbilical hernia without obstruction and without gangrene  -     Ambulatory Referral to General Surgery    13. Sebaceous cyst  -     Ambulatory Referral to General Surgery      Follow Up:  Return in about 3 months (around 5/28/2022) for Recheck.or sooner if symptoms persist or worsen.    Will consider Farxiga or alternative if A1c still increased.    An After Visit Summary and PPPS were made available to the patient.

## 2022-02-28 NOTE — PATIENT INSTRUCTIONS
Continue to monitor your blood sugar once daily before a meal and record results.  Monitor your blood pressure periodically and record results.  Increase intake of protein and decrease intake of carbohydrates and sugars.  Continue to work on healthy diet and exercise.  Follow up pending lab results.  Follow up in 3 months, or sooner if symptoms persist or worsen.  Consider Shingrix vaccine at pharmacy.  Schedule an eye exam.      Medicare Wellness  Personal Prevention Plan of Service     Date of Office Visit:  2022  Encounter Provider:  AGUSTO Zuñiga  Place of Service:  [facility]  Patient Name: Chad Davidson Jr.  :  1960    As part of the Medicare Wellness portion of your visit today, we are providing you with this personalized preventive plan of services (PPPS). This plan is based upon recommendations of the [place] Preventive Services Task Force (USPSTF) and the Advisory Committee on Immunization Practices (ACIP).    This lists the preventive care services that should be considered, and provides dates of when you are due. Items listed as completed are up-to-date and do not require any further intervention.    Health Maintenance   Topic Date Due   • URINE MICROALBUMIN  Never done   • COLORECTAL CANCER SCREENING  Never done   • ZOSTER VACCINE (1 of 2) Never done   • DIABETIC EYE EXAM  Never done   • Pneumococcal Vaccine 0-64 (1 of 2 - PPSV23) 2023 (Originally 1966)   • HEMOGLOBIN A1C  2022   • DIABETIC FOOT EXAM  2022   • LIPID PANEL  2022   • TDAP/TD VACCINES (2 - Td or Tdap) 2023   • ANNUAL WELLNESS VISIT  2023   • HEPATITIS C SCREENING  Completed   • COVID-19 Vaccine  Completed   • INFLUENZA VACCINE  Completed       No orders of the defined types were placed in this encounter.      Return in about 3 months (around 2022) for Recheck.

## 2022-03-01 LAB
ALBUMIN/CREAT UR: 822 MG/G CREAT (ref 0–29)
BUN SERPL-MCNC: 13 MG/DL (ref 8–27)
BUN/CREAT SERPL: 16 (ref 10–24)
CALCIUM SERPL-MCNC: 9.8 MG/DL (ref 8.6–10.2)
CHLORIDE SERPL-SCNC: 102 MMOL/L (ref 96–106)
CO2 SERPL-SCNC: 20 MMOL/L (ref 20–29)
CREAT SERPL-MCNC: 0.8 MG/DL (ref 0.76–1.27)
CREAT UR-MCNC: 77.2 MG/DL
EGFR GENE MUT ANL BLD/T: 101 ML/MIN/1.73
GLUCOSE SERPL-MCNC: 298 MG/DL (ref 65–99)
HBA1C MFR BLD: 10 % (ref 4.8–5.6)
MICROALBUMIN UR-MCNC: 634.8 UG/ML
POTASSIUM SERPL-SCNC: 4.9 MMOL/L (ref 3.5–5.2)
PSA SERPL-MCNC: 0.7 NG/ML (ref 0–4)
SODIUM SERPL-SCNC: 138 MMOL/L (ref 134–144)
URATE SERPL-MCNC: 5.2 MG/DL (ref 3.8–8.4)

## 2022-03-03 DIAGNOSIS — R80.9 MICROALBUMINURIA: ICD-10-CM

## 2022-03-03 DIAGNOSIS — E11.65 UNCONTROLLED TYPE 2 DIABETES MELLITUS WITH HYPERGLYCEMIA: Primary | ICD-10-CM

## 2022-03-03 RX ORDER — DAPAGLIFLOZIN 10 MG/1
10 TABLET, FILM COATED ORAL DAILY
Qty: 30 TABLET | Refills: 2 | Status: SHIPPED | OUTPATIENT
Start: 2022-03-03 | End: 2022-11-02

## 2022-04-12 ENCOUNTER — APPOINTMENT (OUTPATIENT)
Dept: SLEEP MEDICINE | Facility: HOSPITAL | Age: 62
End: 2022-04-12

## 2022-05-13 DIAGNOSIS — I10 PRIMARY HYPERTENSION: ICD-10-CM

## 2022-05-13 RX ORDER — BENAZEPRIL HYDROCHLORIDE 20 MG/1
TABLET ORAL
Qty: 90 TABLET | Refills: 0 | Status: SHIPPED | OUTPATIENT
Start: 2022-05-13 | End: 2022-07-28

## 2022-05-13 RX ORDER — SILDENAFIL 100 MG/1
TABLET, FILM COATED ORAL
Qty: 20 TABLET | Refills: 0 | Status: SHIPPED | OUTPATIENT
Start: 2022-05-13 | End: 2022-07-28

## 2022-05-13 NOTE — TELEPHONE ENCOUNTER
Rx Refill Note  Requested Prescriptions     Pending Prescriptions Disp Refills   • sildenafil (VIAGRA) 100 MG tablet [Pharmacy Med Name: Sildenafil Citrate 100 MG Oral Tablet] 20 tablet 0     Sig: TAKE 1 TABLET BY MOUTH ONCE DAILY AS NEEDED FOR ERECTILE DYSFUNCTION   • benazepril (LOTENSIN) 20 MG tablet [Pharmacy Med Name: Benazepril HCl 20 MG Oral Tablet] 90 tablet 0     Sig: Take 1 tablet by mouth once daily      Last office visit with prescribing clinician: 2/28/2022      Next office visit with prescribing clinician: 6/6/2022            Cindy Berry MA  05/13/22, 09:42 EDT

## 2022-06-28 DIAGNOSIS — M10.9 GOUT, UNSPECIFIED CAUSE, UNSPECIFIED CHRONICITY, UNSPECIFIED SITE: ICD-10-CM

## 2022-06-28 DIAGNOSIS — E11.65 UNCONTROLLED TYPE 2 DIABETES MELLITUS WITH HYPERGLYCEMIA: ICD-10-CM

## 2022-06-28 DIAGNOSIS — I10 PRIMARY HYPERTENSION: ICD-10-CM

## 2022-06-28 RX ORDER — GLIMEPIRIDE 2 MG/1
TABLET ORAL
Qty: 180 TABLET | Refills: 0 | Status: SHIPPED | OUTPATIENT
Start: 2022-06-28 | End: 2022-09-26

## 2022-06-28 RX ORDER — ALLOPURINOL 300 MG/1
TABLET ORAL
Qty: 90 TABLET | Refills: 0 | Status: SHIPPED | OUTPATIENT
Start: 2022-06-28 | End: 2022-10-10 | Stop reason: SDUPTHER

## 2022-06-28 RX ORDER — HYDROCHLOROTHIAZIDE 12.5 MG/1
CAPSULE, GELATIN COATED ORAL
Qty: 90 CAPSULE | Refills: 0 | Status: SHIPPED | OUTPATIENT
Start: 2022-06-28 | End: 2022-10-10 | Stop reason: SDUPTHER

## 2022-06-28 RX ORDER — AMLODIPINE BESYLATE 10 MG/1
TABLET ORAL
Qty: 90 TABLET | Refills: 0 | Status: SHIPPED | OUTPATIENT
Start: 2022-06-28 | End: 2022-10-10 | Stop reason: SDUPTHER

## 2022-06-28 NOTE — TELEPHONE ENCOUNTER
Rx Refill Note  Requested Prescriptions     Pending Prescriptions Disp Refills   • metFORMIN (GLUCOPHAGE) 500 MG tablet [Pharmacy Med Name: metFORMIN HCl 500 MG Oral Tablet] 360 tablet 0     Sig: TAKE 2 TABLETS BY MOUTH TWICE DAILY WITH MEALS   • glimepiride (AMARYL) 2 MG tablet [Pharmacy Med Name: Glimepiride 2 MG Oral Tablet] 180 tablet 0     Sig: TAKE 1 TABLET BY MOUTH TWICE DAILY BEFORE MEAL(S)   • hydroCHLOROthiazide (MICROZIDE) 12.5 MG capsule [Pharmacy Med Name: hydroCHLOROthiazide 12.5 MG Oral Capsule] 90 capsule 0     Sig: Take 1 capsule by mouth once daily in the morning   • amLODIPine (NORVASC) 10 MG tablet [Pharmacy Med Name: amLODIPine Besylate 10 MG Oral Tablet] 90 tablet 0     Sig: Take 1 tablet by mouth once daily   • allopurinol (ZYLOPRIM) 300 MG tablet [Pharmacy Med Name: Allopurinol 300 MG Oral Tablet] 90 tablet 0     Sig: Take 1 tablet by mouth once daily      Last office visit with prescribing clinician: 2/28/2022      Next office visit with prescribing clinician: no appt found     Last refill   Metformin 11/09/2021  Amaryl 11/09/2021  Microzide 11/09/2021  Norvasc 11/09/2021  Zyloprim 11/09/2021                Herlinda Brandt MA  06/28/22, 10:39 EDT

## 2022-07-26 DIAGNOSIS — I10 PRIMARY HYPERTENSION: ICD-10-CM

## 2022-07-28 RX ORDER — SILDENAFIL 100 MG/1
TABLET, FILM COATED ORAL
Qty: 20 TABLET | Refills: 0 | Status: SHIPPED | OUTPATIENT
Start: 2022-07-28 | End: 2022-10-10 | Stop reason: SDUPTHER

## 2022-07-28 RX ORDER — BENAZEPRIL HYDROCHLORIDE 20 MG/1
TABLET ORAL
Qty: 30 TABLET | Refills: 0 | Status: SHIPPED | OUTPATIENT
Start: 2022-07-28 | End: 2022-08-04 | Stop reason: SDUPTHER

## 2022-07-28 NOTE — TELEPHONE ENCOUNTER
Rx Refill Note  Requested Prescriptions     Pending Prescriptions Disp Refills   • benazepril (LOTENSIN) 20 MG tablet [Pharmacy Med Name: Benazepril HCl 20 MG Oral Tablet] 90 tablet 0     Sig: Take 1 tablet by mouth once daily   • sildenafil (VIAGRA) 100 MG tablet [Pharmacy Med Name: Sildenafil Citrate 100 MG Oral Tablet] 20 tablet 0     Sig: TAKE 1 TABLET BY MOUTH ONCE DAILY AS NEEDED FOR ERECTILE DYSFUNCTION      Last office visit with prescribing clinician: 2/28/2022      Next office visit with prescribing clinician: Visit date not found

## 2022-07-28 NOTE — TELEPHONE ENCOUNTER
Called the patient to schedule a follow up for a medication refill. The mailbox was full.     OKAY FOR HUB TO READ AND SCHEDULE:    Please schedule the patient for an office visit for a follow up appointment.

## 2022-08-04 DIAGNOSIS — I10 PRIMARY HYPERTENSION: ICD-10-CM

## 2022-08-05 RX ORDER — BENAZEPRIL HYDROCHLORIDE 20 MG/1
20 TABLET ORAL DAILY
Qty: 30 TABLET | Refills: 0 | Status: SHIPPED | OUTPATIENT
Start: 2022-08-05 | End: 2022-10-10 | Stop reason: SDUPTHER

## 2022-08-05 NOTE — TELEPHONE ENCOUNTER
Rx Refill Note  Requested Prescriptions     Pending Prescriptions Disp Refills   • benazepril (LOTENSIN) 20 MG tablet 30 tablet 0     Sig: Take 1 tablet by mouth Daily.      Last office visit with prescribing clinician: 2/28/2022      Next office visit with prescribing clinician: Visit date not found  Last refill  07/28/2022

## 2022-09-26 DIAGNOSIS — E11.65 UNCONTROLLED TYPE 2 DIABETES MELLITUS WITH HYPERGLYCEMIA: ICD-10-CM

## 2022-09-26 RX ORDER — GLIMEPIRIDE 2 MG/1
TABLET ORAL
Qty: 60 TABLET | Refills: 0 | Status: SHIPPED | OUTPATIENT
Start: 2022-09-26 | End: 2022-11-02 | Stop reason: ALTCHOICE

## 2022-09-26 NOTE — TELEPHONE ENCOUNTER
Rx Refill Note  Requested Prescriptions     Pending Prescriptions Disp Refills   • glimepiride (AMARYL) 2 MG tablet [Pharmacy Med Name: Glimepiride 2 MG Oral Tablet] 180 tablet 0     Sig: TAKE 1 TABLET BY MOUTH TWICE DAILY BEFORE MEAL(S)      Last office visit with prescribing clinician: 2/28/2022      Next office visit with prescribing clinician: Visit date not found            Jatin Castelan, DIGNA/LMR  09/26/22, 15:58 EDT

## 2022-09-27 NOTE — TELEPHONE ENCOUNTER
OKAY FOR HUB TO READ AND SCHEDULE:    [facility] - Patient needs 6-8 month follow up from appointment in Feb. Please schedule his appointment.

## 2022-10-06 DIAGNOSIS — G89.29 CHRONIC LOW BACK PAIN, UNSPECIFIED BACK PAIN LATERALITY, UNSPECIFIED WHETHER SCIATICA PRESENT: ICD-10-CM

## 2022-10-06 DIAGNOSIS — M10.9 GOUT, UNSPECIFIED CAUSE, UNSPECIFIED CHRONICITY, UNSPECIFIED SITE: ICD-10-CM

## 2022-10-06 DIAGNOSIS — M54.50 CHRONIC LOW BACK PAIN, UNSPECIFIED BACK PAIN LATERALITY, UNSPECIFIED WHETHER SCIATICA PRESENT: ICD-10-CM

## 2022-10-06 DIAGNOSIS — I10 PRIMARY HYPERTENSION: ICD-10-CM

## 2022-10-06 DIAGNOSIS — E11.65 UNCONTROLLED TYPE 2 DIABETES MELLITUS WITH HYPERGLYCEMIA: ICD-10-CM

## 2022-10-07 RX ORDER — CYCLOBENZAPRINE HCL 10 MG
TABLET ORAL
Qty: 30 TABLET | Refills: 0 | Status: SHIPPED | OUTPATIENT
Start: 2022-10-07

## 2022-10-09 RX ORDER — AMLODIPINE BESYLATE 10 MG/1
TABLET ORAL
Qty: 90 TABLET | Refills: 0 | OUTPATIENT
Start: 2022-10-09

## 2022-10-09 RX ORDER — ALLOPURINOL 300 MG/1
TABLET ORAL
Qty: 90 TABLET | Refills: 0 | OUTPATIENT
Start: 2022-10-09

## 2022-10-09 RX ORDER — HYDROCHLOROTHIAZIDE 12.5 MG/1
CAPSULE, GELATIN COATED ORAL
Qty: 90 CAPSULE | Refills: 0 | OUTPATIENT
Start: 2022-10-09

## 2022-10-10 DIAGNOSIS — I10 PRIMARY HYPERTENSION: ICD-10-CM

## 2022-10-10 DIAGNOSIS — E11.65 UNCONTROLLED TYPE 2 DIABETES MELLITUS WITH HYPERGLYCEMIA: ICD-10-CM

## 2022-10-10 DIAGNOSIS — M10.9 GOUT, UNSPECIFIED CAUSE, UNSPECIFIED CHRONICITY, UNSPECIFIED SITE: ICD-10-CM

## 2022-10-10 RX ORDER — BENAZEPRIL HYDROCHLORIDE 20 MG/1
20 TABLET ORAL DAILY
Qty: 30 TABLET | Refills: 0 | Status: SHIPPED | OUTPATIENT
Start: 2022-10-10 | End: 2022-11-02 | Stop reason: SDUPTHER

## 2022-10-10 RX ORDER — SILDENAFIL 100 MG/1
100 TABLET, FILM COATED ORAL DAILY PRN
Qty: 20 TABLET | Refills: 0 | Status: SHIPPED | OUTPATIENT
Start: 2022-10-10 | End: 2022-11-02 | Stop reason: SDUPTHER

## 2022-10-10 RX ORDER — AMLODIPINE BESYLATE 10 MG/1
TABLET ORAL
Qty: 90 TABLET | Refills: 0 | OUTPATIENT
Start: 2022-10-10

## 2022-10-10 RX ORDER — GLIMEPIRIDE 2 MG/1
TABLET ORAL
Qty: 180 TABLET | Refills: 0 | OUTPATIENT
Start: 2022-10-10

## 2022-10-10 RX ORDER — HYDROCHLOROTHIAZIDE 12.5 MG/1
12.5 CAPSULE, GELATIN COATED ORAL EVERY MORNING
Qty: 30 CAPSULE | Refills: 0 | Status: SHIPPED | OUTPATIENT
Start: 2022-10-10 | End: 2022-11-02 | Stop reason: SDUPTHER

## 2022-10-10 RX ORDER — HYDROCHLOROTHIAZIDE 12.5 MG/1
CAPSULE, GELATIN COATED ORAL
Qty: 90 CAPSULE | Refills: 0 | OUTPATIENT
Start: 2022-10-10

## 2022-10-10 RX ORDER — ALLOPURINOL 300 MG/1
300 TABLET ORAL DAILY
Qty: 30 TABLET | Refills: 0 | Status: SHIPPED | OUTPATIENT
Start: 2022-10-10 | End: 2022-11-02 | Stop reason: SDUPTHER

## 2022-10-10 RX ORDER — GLIMEPIRIDE 2 MG/1
2 TABLET ORAL
Qty: 60 TABLET | Refills: 0 | OUTPATIENT
Start: 2022-10-10

## 2022-10-10 RX ORDER — BENAZEPRIL HYDROCHLORIDE 20 MG/1
TABLET ORAL
Qty: 90 TABLET | Refills: 0 | OUTPATIENT
Start: 2022-10-10

## 2022-10-10 RX ORDER — AMLODIPINE BESYLATE 10 MG/1
10 TABLET ORAL DAILY
Qty: 30 TABLET | Refills: 0 | Status: SHIPPED | OUTPATIENT
Start: 2022-10-10 | End: 2022-11-02 | Stop reason: SDUPTHER

## 2022-10-10 RX ORDER — ALLOPURINOL 300 MG/1
TABLET ORAL
Qty: 90 TABLET | Refills: 0 | OUTPATIENT
Start: 2022-10-10

## 2022-11-02 ENCOUNTER — OFFICE VISIT (OUTPATIENT)
Dept: FAMILY MEDICINE CLINIC | Facility: CLINIC | Age: 62
End: 2022-11-02

## 2022-11-02 VITALS
DIASTOLIC BLOOD PRESSURE: 70 MMHG | WEIGHT: 284.2 LBS | OXYGEN SATURATION: 100 % | SYSTOLIC BLOOD PRESSURE: 130 MMHG | BODY MASS INDEX: 38.49 KG/M2 | HEART RATE: 98 BPM | HEIGHT: 72 IN | TEMPERATURE: 98 F

## 2022-11-02 DIAGNOSIS — E78.2 MIXED HYPERLIPIDEMIA: ICD-10-CM

## 2022-11-02 DIAGNOSIS — E11.21 TYPE 2 DIABETES MELLITUS WITH DIABETIC NEPHROPATHY, WITHOUT LONG-TERM CURRENT USE OF INSULIN: ICD-10-CM

## 2022-11-02 DIAGNOSIS — G89.29 CHRONIC LOW BACK PAIN, UNSPECIFIED BACK PAIN LATERALITY, UNSPECIFIED WHETHER SCIATICA PRESENT: ICD-10-CM

## 2022-11-02 DIAGNOSIS — Z12.11 ENCOUNTER FOR SCREENING FOR MALIGNANT NEOPLASM OF COLON: ICD-10-CM

## 2022-11-02 DIAGNOSIS — I10 PRIMARY HYPERTENSION: ICD-10-CM

## 2022-11-02 DIAGNOSIS — E11.65 UNCONTROLLED TYPE 2 DIABETES MELLITUS WITH HYPERGLYCEMIA: Primary | ICD-10-CM

## 2022-11-02 DIAGNOSIS — N52.9 ERECTILE DYSFUNCTION, UNSPECIFIED ERECTILE DYSFUNCTION TYPE: ICD-10-CM

## 2022-11-02 DIAGNOSIS — E66.01 CLASS 2 SEVERE OBESITY WITH SERIOUS COMORBIDITY AND BODY MASS INDEX (BMI) OF 38.0 TO 38.9 IN ADULT, UNSPECIFIED OBESITY TYPE: ICD-10-CM

## 2022-11-02 DIAGNOSIS — M54.50 CHRONIC LOW BACK PAIN, UNSPECIFIED BACK PAIN LATERALITY, UNSPECIFIED WHETHER SCIATICA PRESENT: ICD-10-CM

## 2022-11-02 DIAGNOSIS — M10.9 GOUT, UNSPECIFIED CAUSE, UNSPECIFIED CHRONICITY, UNSPECIFIED SITE: ICD-10-CM

## 2022-11-02 PROCEDURE — 99215 OFFICE O/P EST HI 40 MIN: CPT | Performed by: NURSE PRACTITIONER

## 2022-11-02 RX ORDER — BENAZEPRIL HYDROCHLORIDE 20 MG/1
20 TABLET ORAL DAILY
Qty: 90 TABLET | Refills: 1 | Status: SHIPPED | OUTPATIENT
Start: 2022-11-02

## 2022-11-02 RX ORDER — HYDROCHLOROTHIAZIDE 12.5 MG/1
12.5 CAPSULE, GELATIN COATED ORAL EVERY MORNING
Qty: 90 CAPSULE | Refills: 1 | Status: SHIPPED | OUTPATIENT
Start: 2022-11-02

## 2022-11-02 RX ORDER — AMLODIPINE BESYLATE 10 MG/1
10 TABLET ORAL DAILY
Qty: 90 TABLET | Refills: 1 | Status: SHIPPED | OUTPATIENT
Start: 2022-11-02

## 2022-11-02 RX ORDER — GLIMEPIRIDE 2 MG/1
2 TABLET ORAL
Qty: 90 TABLET | Refills: 1 | Status: SHIPPED | OUTPATIENT
Start: 2022-11-02

## 2022-11-02 RX ORDER — GLIMEPIRIDE 4 MG/1
4 TABLET ORAL
Qty: 90 TABLET | Refills: 1 | Status: SHIPPED | OUTPATIENT
Start: 2022-11-02

## 2022-11-02 RX ORDER — ALLOPURINOL 300 MG/1
300 TABLET ORAL DAILY
Qty: 90 TABLET | Refills: 1 | Status: SHIPPED | OUTPATIENT
Start: 2022-11-02

## 2022-11-02 RX ORDER — SILDENAFIL 100 MG/1
100 TABLET, FILM COATED ORAL DAILY PRN
Qty: 30 TABLET | Refills: 0 | Status: SHIPPED | OUTPATIENT
Start: 2022-11-02

## 2022-11-02 NOTE — PATIENT INSTRUCTIONS
Increase Glimepiride to 4 mg with breakfast and 2 mg with evening meal.  May take Tylenol 1000 mg three times daily as needed.  Continue to monitor your blood sugar once daily before a meal and record results.  Continue to monitor your blood pressure periodically and record results.  Continue to work on healthy diet and exercise.  Follow up pending lab results.  Follow up in 3 months, or sooner if problems or concerns.

## 2022-11-02 NOTE — PROGRESS NOTES
Answers for HPI/ROS submitted by the patient on 11/2/2022  What is the primary reason for your visit?: Other  Please describe your symptoms.: No symptoms  Have you had these symptoms before?: Yes  How long have you been having these symptoms?: Greater than 2 weeks  Please describe any probable cause for these symptoms. : Need prescriptions refilled    Subjective   Chad Davidson Jr. is a 62 y.o. male.     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 Haq in [place], [place]; no diabetic retinopathy; declines statin.     F/U HTN: takes Amlodipine, Benazepril, and HCTZ daily; does not typically monitor BP; no headaches; no orthostasis; no swelling.     F/U gout: takes Allopurinol daily; no gout flares.    F/U microalbuminuria: renal stopped Naproxen due to elevated microalbumin; had follow up in September.    F/U lower back pain: takes Tylenol as needed and helps some; takes Tylenol 1000 mg daily as needed; takes Flexeril as needed for flares; has not taken Flexeril recently.    Needs repeat colonoscopy    Currently on disability for problems with back and legs; had discectomy in past.    The following portions of the patient's history were reviewed and updated as appropriate: allergies, current medications, past family history, past medical history, past social history, past surgical history and problem list.      Current Outpatient Medications   Medication Sig Dispense Refill   • allopurinol (ZYLOPRIM) 300 MG tablet Take 1 tablet by mouth Daily. 90  tablet 1   • amLODIPine (NORVASC) 10 MG tablet Take 1 tablet by mouth Daily. 90 tablet 1   • aspirin 81 MG EC tablet Take 81 mg by mouth Daily.     • benazepril (LOTENSIN) 20 MG tablet Take 1 tablet by mouth Daily. 90 tablet 1   • cyclobenzaprine (FLEXERIL) 10 MG tablet Take 1 tablet by mouth three times daily as needed for muscle spasm 30 tablet 0   • glimepiride (AMARYL) 2 MG tablet Take 1 tablet by mouth Daily With Dinner. 90 tablet 1   • hydroCHLOROthiazide (MICROZIDE) 12.5 MG capsule Take 1 capsule by mouth Every Morning. 90 capsule 1   • metFORMIN (GLUCOPHAGE) 500 MG tablet Take 2 tablets by mouth 2 (Two) Times a Day With Meals. 360 tablet 1   • multivitamin with minerals tablet tablet Take 1 tablet by mouth Daily.     • sildenafil (VIAGRA) 100 MG tablet Take 1 tablet by mouth Daily As Needed for Erectile Dysfunction. 30 tablet 0   • glimepiride (Amaryl) 4 MG tablet Take 1 tablet by mouth Every Morning Before Breakfast. 90 tablet 1     No current facility-administered medications for this visit.       Past Medical History:   Diagnosis Date   • Arthritis    • Brown recluse spider bite     with necrotizing fasciitis   • COVID-19 virus infection 2021   • Diabetes mellitus (HCC)    • Erectile dysfunction    • Hypertension    • Low back pain    • Parotitis 2019       Past Surgical History:   Procedure Laterality Date   • BACK SURGERY     • COLONOSCOPY  approx     2 polyps removed   • JOINT REPLACEMENT     • REPAIR PATELLAR TENDON Right    • REPLACEMENT TOTAL KNEE Bilateral        Family History   Problem Relation Age of Onset   • Diabetes Father    • Alcohol abuse Father    • Hepatitis Father    • Diabetes Sister        Social History     Socioeconomic History   • Marital status:    Tobacco Use   • Smoking status: Former     Packs/day: 2.50     Years: 20.00     Pack years: 50.00     Types: Cigarettes     Quit date: 1995     Years since quittin.8   • Smokeless tobacco: Never  "  Vaping Use   • Vaping Use: Never used   Substance and Sexual Activity   • Alcohol use: Not Currently     Alcohol/week: 0.0 standard drinks     Comment: no ETOH in last 5 years   • Drug use: Never   • Sexual activity: Yes       Review of Systems   Constitutional: Negative for appetite change, chills, fever, unexpected weight gain and unexpected weight loss. Fatigue: some.   HENT: Negative for ear pain, sinus pressure, sore throat and trouble swallowing.    Eyes: Negative for blurred vision.   Respiratory: Negative for cough, chest tightness and shortness of breath. Apnea: does snore; did not go for sleep study; declines at this time, will consider.    Cardiovascular: Negative for chest pain and palpitations.   Gastrointestinal: Negative for abdominal pain, blood in stool, constipation, diarrhea, GERD and indigestion (some if eats too much spicy food; takes TUMs as needed and helps).   Endocrine: Negative for polydipsia (drinks a lot of water).   Genitourinary: Positive for frequency (no change) and nocturia (gets up 2-3 times per night). Negative for dysuria.   Musculoskeletal: Back pain: see HPI.   Skin: Negative for rash.   Neurological: Negative for syncope and weakness.   Hematological: Does not bruise/bleed easily.   Psychiatric/Behavioral: Negative for depressed mood. The patient is not nervous/anxious.        Objective   Vitals:    11/02/22 1350 11/02/22 1445   BP: 150/86 130/70   BP Location: Left arm Left arm   Patient Position: Sitting Sitting   Cuff Size: Adult    Pulse: 98    Temp: 98 °F (36.7 °C)    SpO2: 100%    Weight: 129 kg (284 lb 3.2 oz)    Height: 182.9 cm (72\")      Body mass index is 38.54 kg/m².    Physical Exam  Vitals and nursing note reviewed.   Constitutional:       General: He is not in acute distress.     Appearance: He is well-developed and well-groomed. He is not diaphoretic.   HENT:      Head: Normocephalic.      Right Ear: Tympanic membrane and external ear normal. No decreased " hearing noted.      Left Ear: Tympanic membrane and external ear normal. No decreased hearing noted.      Nose: Nose normal.      Right Sinus: No maxillary sinus tenderness or frontal sinus tenderness.      Left Sinus: No maxillary sinus tenderness or frontal sinus tenderness.      Mouth/Throat:      Mouth: Mucous membranes are moist.      Pharynx: No oropharyngeal exudate or posterior oropharyngeal erythema.      Comments: Whitish lesion on left inner cheek; pt referred to oral surgery per dentist after OV last week  Eyes:      Conjunctiva/sclera: Conjunctivae normal.   Neck:      Vascular: No carotid bruit.   Cardiovascular:      Rate and Rhythm: Normal rate and regular rhythm.      Pulses: Normal pulses.           Dorsalis pedis pulses are 2+ on the right side and 2+ on the left side.        Posterior tibial pulses are 2+ on the right side and 2+ on the left side.      Heart sounds: Normal heart sounds. No murmur heard.  Pulmonary:      Effort: Pulmonary effort is normal. No respiratory distress.      Breath sounds: Normal breath sounds.   Abdominal:      General: Bowel sounds are normal.      Palpations: Abdomen is soft. There is no hepatomegaly or splenomegaly.      Tenderness: There is no abdominal tenderness. There is no guarding.   Musculoskeletal:      Cervical back: Normal range of motion and neck supple. No bony tenderness.      Thoracic back: No bony tenderness.      Lumbar back: No bony tenderness.      Right lower leg: No edema.      Left lower leg: No edema.        Feet:    Feet:      Right foot:      Protective Sensation: 10 sites tested. 10 sites sensed.      Skin integrity: Skin integrity normal.      Left foot:      Protective Sensation: 10 sites tested. 9 sites sensed.      Skin integrity: Skin integrity normal.      Comments: Diabetic Foot Exam Performed and Monofilament Test Performed    Lymphadenopathy:      Cervical: No cervical adenopathy.   Skin:     General: Skin is warm and dry.       Findings: No rash.   Neurological:      Mental Status: He is alert and oriented to person, place, and time.      Gait: Gait is intact.   Psychiatric:         Mood and Affect: Mood normal.         Behavior: Behavior normal.         Thought Content: Thought content normal.         Cognition and Memory: Cognition normal.         Judgment: Judgment normal.         Lab Results   Component Value Date    CHLPL 217 (H) 11/02/2022    TRIG 378 (H) 11/02/2022    HDL 34 (L) 11/02/2022    VLDL 66 (H) 11/02/2022     (H) 11/02/2022     Lab Results   Component Value Date    PSA 0.7 02/28/2022         Assessment    Problem List Items Addressed This Visit     Chronic low back pain    Current Assessment & Plan     Continue Tylenol as needed.  May take Tylenol 1000 mg three times daily as needed.           Gout    Current Assessment & Plan     Stable.  Continue Allopurinol daily.         Relevant Medications    allopurinol (ZYLOPRIM) 300 MG tablet    Other Relevant Orders    Uric Acid (Completed)    Hyperlipidemia    Current Assessment & Plan     Continue to work on healthy diet and exercise.         Relevant Orders    Comprehensive Metabolic Panel (Completed)    Lipid Panel With LDL / HDL Ratio (Completed)    Hypertension    Current Assessment & Plan     Hypertension is stable.  Regular aerobic exercise.  Continue current medications.  Ambulatory blood pressure monitoring.  Blood pressure will be reassessed in 3 months.  Continue Amlodipine, Benazepril, and HCTZ daily.         Relevant Medications    amLODIPine (NORVASC) 10 MG tablet    benazepril (LOTENSIN) 20 MG tablet    hydroCHLOROthiazide (MICROZIDE) 12.5 MG capsule    Other Relevant Orders    Comprehensive Metabolic Panel (Completed)    CBC & Differential (Completed)    Lipid Panel With LDL / HDL Ratio (Completed)    Class 2 severe obesity with serious comorbidity and body mass index (BMI) of 38.0 to 38.9 in adult (HCC)    Current Assessment & Plan     Patient's (Body  mass index is 38.54 kg/m².) indicates that they are morbidly obese (BMI > 40 or > 35 with obesity - related health condition) with health conditions that include hypertension and diabetes mellitus . Weight is improving with lifestyle modifications. BMI is is above average; BMI management plan is completed. We discussed low calorie, low carb based diet program, portion control and increasing exercise.          Uncontrolled type 2 diabetes mellitus with hyperglycemia (HCC) - Primary    Current Assessment & Plan     Diabetes is pending lab results.   Continue current treatment regimen.  Regular aerobic exercise.  Reminded to get yearly retinal exam.  Diabetes will be reassessed in 3 months.  Continue Metformin twice daily.  Increase Glimepiride to 4 mg with breakfast and 2 mg with evening meal.         Relevant Medications    glimepiride (Amaryl) 4 MG tablet    glimepiride (AMARYL) 2 MG tablet    metFORMIN (GLUCOPHAGE) 500 MG tablet    Other Relevant Orders    Comprehensive Metabolic Panel (Completed)    Lipid Panel With LDL / HDL Ratio (Completed)    Hemoglobin A1c (Completed)    Type 2 diabetes mellitus with diabetic nephropathy (HCC)    Current Assessment & Plan     Schedule a follow up appointment with renal.         Relevant Medications    glimepiride (Amaryl) 4 MG tablet    glimepiride (AMARYL) 2 MG tablet    metFORMIN (GLUCOPHAGE) 500 MG tablet    hydroCHLOROthiazide (MICROZIDE) 12.5 MG capsule    Erectile dysfunction    Relevant Medications    sildenafil (VIAGRA) 100 MG tablet   Other Visit Diagnoses     Encounter for screening for malignant neoplasm of colon        Relevant Orders    Ambulatory Referral to Gastroenterology          Return in about 3 months (around 2/2/2023) for Recheck.or sooner if symptoms persist or worsen.       I spent 45 minutes caring for Chad on this date of service. This time includes time spent by me in the following activities:reviewing tests, obtaining and/or reviewing a  separately obtained history, performing a medically appropriate examination and/or evaluation , counseling and educating the patient/family/caregiver, ordering medications, tests, or procedures and documenting information in the medical record    COVID-19 Precautions - Patient was compliant in wearing a mask. When I saw the patient, I used appropriate personal protective equipment (PPE) including mask, gloves, and eye shield (standard procedure).  Hand hygiene was completed before and after seeing the patient.

## 2022-11-03 PROBLEM — E66.812 CLASS 2 SEVERE OBESITY WITH SERIOUS COMORBIDITY AND BODY MASS INDEX (BMI) OF 38.0 TO 38.9 IN ADULT: Status: ACTIVE | Noted: 2021-11-09

## 2022-11-03 PROBLEM — N52.9 ERECTILE DYSFUNCTION: Status: ACTIVE | Noted: 2022-11-03

## 2022-11-03 LAB
ALBUMIN SERPL-MCNC: 4.8 G/DL (ref 3.8–4.8)
ALBUMIN/GLOB SERPL: 2 {RATIO} (ref 1.2–2.2)
ALP SERPL-CCNC: 103 IU/L (ref 44–121)
ALT SERPL-CCNC: 39 IU/L (ref 0–44)
AST SERPL-CCNC: 23 IU/L (ref 0–40)
BASOPHILS # BLD AUTO: 0.1 X10E3/UL (ref 0–0.2)
BASOPHILS NFR BLD AUTO: 1 %
BILIRUB SERPL-MCNC: 0.9 MG/DL (ref 0–1.2)
BUN SERPL-MCNC: 15 MG/DL (ref 8–27)
BUN/CREAT SERPL: 17 (ref 10–24)
CALCIUM SERPL-MCNC: 10.4 MG/DL (ref 8.6–10.2)
CHLORIDE SERPL-SCNC: 95 MMOL/L (ref 96–106)
CHOLEST SERPL-MCNC: 217 MG/DL (ref 100–199)
CO2 SERPL-SCNC: 22 MMOL/L (ref 20–29)
CREAT SERPL-MCNC: 0.88 MG/DL (ref 0.76–1.27)
EGFRCR SERPLBLD CKD-EPI 2021: 97 ML/MIN/1.73
EOSINOPHIL # BLD AUTO: 0.6 X10E3/UL (ref 0–0.4)
EOSINOPHIL NFR BLD AUTO: 8 %
ERYTHROCYTE [DISTWIDTH] IN BLOOD BY AUTOMATED COUNT: 12.8 % (ref 11.6–15.4)
GLOBULIN SER CALC-MCNC: 2.4 G/DL (ref 1.5–4.5)
GLUCOSE SERPL-MCNC: 288 MG/DL (ref 70–99)
HBA1C MFR BLD: 10.4 % (ref 4.8–5.6)
HCT VFR BLD AUTO: 52.5 % (ref 37.5–51)
HDLC SERPL-MCNC: 34 MG/DL
HGB BLD-MCNC: 17.6 G/DL (ref 13–17.7)
IMM GRANULOCYTES # BLD AUTO: 0 X10E3/UL (ref 0–0.1)
IMM GRANULOCYTES NFR BLD AUTO: 0 %
LDLC SERPL CALC-MCNC: 117 MG/DL (ref 0–99)
LDLC/HDLC SERPL: 3.4 RATIO (ref 0–3.6)
LYMPHOCYTES # BLD AUTO: 2.3 X10E3/UL (ref 0.7–3.1)
LYMPHOCYTES NFR BLD AUTO: 28 %
MCH RBC QN AUTO: 30 PG (ref 26.6–33)
MCHC RBC AUTO-ENTMCNC: 33.5 G/DL (ref 31.5–35.7)
MCV RBC AUTO: 90 FL (ref 79–97)
MONOCYTES # BLD AUTO: 0.6 X10E3/UL (ref 0.1–0.9)
MONOCYTES NFR BLD AUTO: 7 %
NEUTROPHILS # BLD AUTO: 4.6 X10E3/UL (ref 1.4–7)
NEUTROPHILS NFR BLD AUTO: 56 %
PLATELET # BLD AUTO: 420 X10E3/UL (ref 150–450)
POTASSIUM SERPL-SCNC: 4.5 MMOL/L (ref 3.5–5.2)
PROT SERPL-MCNC: 7.2 G/DL (ref 6–8.5)
RBC # BLD AUTO: 5.86 X10E6/UL (ref 4.14–5.8)
SODIUM SERPL-SCNC: 133 MMOL/L (ref 134–144)
TRIGL SERPL-MCNC: 378 MG/DL (ref 0–149)
URATE SERPL-MCNC: 4.7 MG/DL (ref 3.8–8.4)
VLDLC SERPL CALC-MCNC: 66 MG/DL (ref 5–40)
WBC # BLD AUTO: 8.2 X10E3/UL (ref 3.4–10.8)

## 2022-11-04 DIAGNOSIS — E11.65 UNCONTROLLED TYPE 2 DIABETES MELLITUS WITH HYPERGLYCEMIA: Primary | ICD-10-CM

## 2022-11-04 DIAGNOSIS — E11.21 TYPE 2 DIABETES MELLITUS WITH DIABETIC NEPHROPATHY, WITHOUT LONG-TERM CURRENT USE OF INSULIN: ICD-10-CM

## 2022-11-04 NOTE — ASSESSMENT & PLAN NOTE
Hypertension is stable.  Regular aerobic exercise.  Continue current medications.  Ambulatory blood pressure monitoring.  Blood pressure will be reassessed in 3 months.  Continue Amlodipine, Benazepril, and HCTZ daily.

## 2022-11-04 NOTE — ASSESSMENT & PLAN NOTE
Diabetes is pending lab results.   Continue current treatment regimen.  Regular aerobic exercise.  Reminded to get yearly retinal exam.  Diabetes will be reassessed in 3 months.  Continue Metformin twice daily.  Increase Glimepiride to 4 mg with breakfast and 2 mg with evening meal.

## 2022-11-04 NOTE — ASSESSMENT & PLAN NOTE
Patient's (Body mass index is 38.54 kg/m².) indicates that they are morbidly obese (BMI > 40 or > 35 with obesity - related health condition) with health conditions that include hypertension and diabetes mellitus . Weight is improving with lifestyle modifications. BMI is is above average; BMI management plan is completed. We discussed low calorie, low carb based diet program, portion control and increasing exercise.

## 2022-11-11 DIAGNOSIS — E83.52 HYPERCALCEMIA: Primary | ICD-10-CM

## 2022-11-11 DIAGNOSIS — R06.83 SNORING: ICD-10-CM

## 2023-04-13 DIAGNOSIS — E11.65 UNCONTROLLED TYPE 2 DIABETES MELLITUS WITH HYPERGLYCEMIA: ICD-10-CM

## 2023-04-13 DIAGNOSIS — I10 PRIMARY HYPERTENSION: ICD-10-CM

## 2023-04-13 DIAGNOSIS — N52.9 ERECTILE DYSFUNCTION, UNSPECIFIED ERECTILE DYSFUNCTION TYPE: ICD-10-CM

## 2023-04-13 RX ORDER — GLIMEPIRIDE 2 MG/1
TABLET ORAL
Qty: 90 TABLET | Refills: 0 | OUTPATIENT
Start: 2023-04-13

## 2023-04-13 RX ORDER — GLIMEPIRIDE 4 MG/1
TABLET ORAL
Qty: 90 TABLET | Refills: 0 | OUTPATIENT
Start: 2023-04-13

## 2023-04-13 RX ORDER — HYDROCHLOROTHIAZIDE 12.5 MG/1
CAPSULE, GELATIN COATED ORAL
Qty: 30 CAPSULE | Refills: 0 | OUTPATIENT
Start: 2023-04-13

## 2023-04-13 RX ORDER — AMLODIPINE BESYLATE 10 MG/1
TABLET ORAL
Qty: 30 TABLET | Refills: 0 | OUTPATIENT
Start: 2023-04-13

## 2023-04-13 RX ORDER — SILDENAFIL 100 MG/1
TABLET, FILM COATED ORAL
Qty: 30 TABLET | Refills: 0 | OUTPATIENT
Start: 2023-04-13

## 2023-04-13 NOTE — TELEPHONE ENCOUNTER
LOV             11/2/2022   NOV             Return in about 3 months (around 2/2/2023) for Recheck.    Last RF    11/2/22     From 11/2/22  Patient will need an appointment for additional refills    JN James/NATHAN

## 2023-05-03 ENCOUNTER — OFFICE VISIT (OUTPATIENT)
Dept: FAMILY MEDICINE CLINIC | Facility: CLINIC | Age: 63
End: 2023-05-03
Payer: MEDICARE

## 2023-05-03 VITALS
TEMPERATURE: 98.3 F | HEIGHT: 72 IN | SYSTOLIC BLOOD PRESSURE: 132 MMHG | OXYGEN SATURATION: 96 % | DIASTOLIC BLOOD PRESSURE: 72 MMHG | BODY MASS INDEX: 37.52 KG/M2 | HEART RATE: 105 BPM | WEIGHT: 277 LBS

## 2023-05-03 DIAGNOSIS — G89.29 CHRONIC LOW BACK PAIN, UNSPECIFIED BACK PAIN LATERALITY, UNSPECIFIED WHETHER SCIATICA PRESENT: ICD-10-CM

## 2023-05-03 DIAGNOSIS — E66.01 CLASS 2 SEVERE OBESITY WITH SERIOUS COMORBIDITY AND BODY MASS INDEX (BMI) OF 37.0 TO 37.9 IN ADULT, UNSPECIFIED OBESITY TYPE: ICD-10-CM

## 2023-05-03 DIAGNOSIS — E11.29 PERSISTENT PROTEINURIA DUE TO TYPE 2 DIABETES MELLITUS: ICD-10-CM

## 2023-05-03 DIAGNOSIS — M54.50 CHRONIC LOW BACK PAIN, UNSPECIFIED BACK PAIN LATERALITY, UNSPECIFIED WHETHER SCIATICA PRESENT: ICD-10-CM

## 2023-05-03 DIAGNOSIS — M10.9 GOUT, UNSPECIFIED CAUSE, UNSPECIFIED CHRONICITY, UNSPECIFIED SITE: ICD-10-CM

## 2023-05-03 DIAGNOSIS — Z12.11 COLON CANCER SCREENING: ICD-10-CM

## 2023-05-03 DIAGNOSIS — E11.65 UNCONTROLLED TYPE 2 DIABETES MELLITUS WITH HYPERGLYCEMIA: ICD-10-CM

## 2023-05-03 DIAGNOSIS — Z00.00 ENCOUNTER FOR MEDICARE ANNUAL WELLNESS EXAM: Primary | ICD-10-CM

## 2023-05-03 DIAGNOSIS — E78.2 MIXED HYPERLIPIDEMIA: ICD-10-CM

## 2023-05-03 DIAGNOSIS — I10 PRIMARY HYPERTENSION: ICD-10-CM

## 2023-05-03 DIAGNOSIS — E83.52 HYPERCALCEMIA: ICD-10-CM

## 2023-05-03 DIAGNOSIS — R80.9 PERSISTENT PROTEINURIA DUE TO TYPE 2 DIABETES MELLITUS: ICD-10-CM

## 2023-05-03 DIAGNOSIS — Z12.5 PROSTATE CANCER SCREENING: ICD-10-CM

## 2023-05-03 RX ORDER — GABAPENTIN 100 MG/1
100 CAPSULE ORAL 3 TIMES DAILY
COMMUNITY
Start: 2023-02-03

## 2023-05-03 NOTE — PROGRESS NOTES
The ABCs of the Annual Wellness Visit  Subsequent Medicare Wellness Visit    Alexei Davidson Jr. is a 62 y.o. male who presents for a Subsequent Medicare Wellness Visit.    The following portions of the patient's history were reviewed and   updated as appropriate: allergies, current medications, past family history, past medical history, past social history, past surgical history and problem list.    Compared to one year ago, the patient feels his physical   health is the same.    Compared to one year ago, the patient feels his mental   health is the same.    Recent Hospitalizations:  He was not admitted to the hospital during the last year.       Current Medical Providers:  Patient Care Team:  Eri David APRN as PCP - General (Family Medicine)  Jesus Haq OD (Optometry)  Jak Alvarez MD as Consulting Physician (Nephrology)    Outpatient Medications Prior to Visit   Medication Sig Dispense Refill   • gabapentin (NEURONTIN) 100 MG capsule Take 1 capsule by mouth 3 (Three) Times a Day.     • allopurinol (ZYLOPRIM) 300 MG tablet Take 1 tablet by mouth Daily. 90 tablet 1   • amLODIPine (NORVASC) 10 MG tablet Take 1 tablet by mouth Daily. 90 tablet 1   • benazepril (LOTENSIN) 20 MG tablet Take 1 tablet by mouth Daily. 90 tablet 1   • glimepiride (AMARYL) 2 MG tablet Take 1 tablet by mouth Daily With Dinner. 90 tablet 1   • glimepiride (Amaryl) 4 MG tablet Take 1 tablet by mouth Every Morning Before Breakfast. 90 tablet 1   • hydroCHLOROthiazide (MICROZIDE) 12.5 MG capsule Take 1 capsule by mouth Every Morning. 90 capsule 1   • metFORMIN (GLUCOPHAGE) 500 MG tablet Take 2 tablets by mouth 2 (Two) Times a Day With Meals. 360 tablet 1   • aspirin 81 MG EC tablet Take 81 mg by mouth Daily.     • cyclobenzaprine (FLEXERIL) 10 MG tablet Take 1 tablet by mouth three times daily as needed for muscle spasm 30 tablet 0   • multivitamin with minerals tablet tablet Take 1 tablet by mouth Daily.     •  "sildenafil (VIAGRA) 100 MG tablet Take 1 tablet by mouth Daily As Needed for Erectile Dysfunction. 30 tablet 0     No facility-administered medications prior to visit.       No opioid medication identified on active medication list. I have reviewed chart for other potential  high risk medication/s and harmful drug interactions in the elderly.          Aspirin is on active medication list. Aspirin use is indicated based on review of current medical condition/s. Pros and cons of this therapy have been discussed today. Benefits of this medication outweigh potential harm.  Patient has been encouraged to continue taking this medication.  .      Patient Active Problem List   Diagnosis   • Arthritis   • Chronic low back pain   • Gout   • Hyperlipidemia   • Hypertension   • Class 2 severe obesity with serious comorbidity and body mass index (BMI) of 37.0 to 37.9 in adult   • Uncontrolled type 2 diabetes mellitus with hyperglycemia   • Microalbuminuria   • Type 2 diabetes mellitus with diabetic nephropathy   • Erectile dysfunction   • Persistent proteinuria due to type 2 diabetes mellitus   • Hypercalcemia     Advance Care Planning   Advance Care Planning     Advance Directive is not on file.  ACP discussion was held with the patient during this visit. Patient does not have an advance directive, information provided.     Objective    Vitals:    05/03/23 1427   BP: 132/72   BP Location: Right arm   Patient Position: Sitting   Cuff Size: Large Adult   Pulse: 105   Temp: 98.3 °F (36.8 °C)   TempSrc: Temporal   SpO2: 96%   Weight: 126 kg (277 lb)   Height: 182.9 cm (72\")     Estimated body mass index is 37.57 kg/m² as calculated from the following:    Height as of this encounter: 182.9 cm (72\").    Weight as of this encounter: 126 kg (277 lb).    Class 2 Severe Obesity (BMI >=35 and <=39.9). Obesity-related health conditions include the following: hypertension, diabetes mellitus and dyslipidemias. Obesity is improving with " lifestyle modifications. BMI is is above average; BMI management plan is completed. We discussed low calorie, low carb based diet program, portion control and increasing exercise.      Does the patient have evidence of cognitive impairment? No    Lab Results   Component Value Date    CHLPL 199 2023    TRIG 184 (H) 2023    HDL 40 2023     (H) 2023    VLDL 33 2023    HGBA1C 9.5 (H) 2023        HEALTH RISK ASSESSMENT    Smoking Status:  Social History     Tobacco Use   Smoking Status Former   • Packs/day: 2.50   • Years: 20.00   • Pack years: 50.00   • Types: Cigarettes   • Quit date: 1995   • Years since quittin.3   Smokeless Tobacco Never     Alcohol Consumption:  Social History     Substance and Sexual Activity   Alcohol Use Not Currently    Comment: no ETOH in last 6 years     Fall Risk Screen:    [place] Fall Risk Assessment was completed, and patient is at MODERATE risk for falls. Assessment completed on:5/3/2023    Depression Screenin/3/2023     3:00 PM   PHQ-2/PHQ-9 Depression Screening   Little Interest or Pleasure in Doing Things 0-->not at all   Feeling Down, Depressed or Hopeless 0-->not at all   PHQ-9: Brief Depression Severity Measure Score 0       Health Habits and Functional and Cognitive Screenin/3/2023     3:00 PM   Functional & Cognitive Status   Do you have difficulty preparing food and eating? No   Do you have difficulty bathing yourself, getting dressed or grooming yourself? No   Do you have difficulty using the toilet? No   Do you have difficulty moving around from place to place? No   Do you have trouble with steps or getting out of a bed or a chair? No   Current Diet Well Balanced Diet   Dental Exam Up to date   Eye Exam Up to date   Exercise (times per week) 7 times per week   Current Exercises Include Other   Do you need help using the phone?  No   Are you deaf or do you have serious difficulty hearing?  No   Do you need  help with transportation? No   Do you need help shopping? No   Do you need help preparing meals?  No   Do you need help with housework?  No   Do you need help with laundry? No   Do you need help taking your medications? No   Do you need help managing money? No   Do you ever drive or ride in a car without wearing a seat belt? No   Have you felt unusual stress, anger or loneliness in the last month? No   Who do you live with? Alone   If you need help, do you have trouble finding someone available to you? No   Have you been bothered in the last four weeks by sexual problems? No   Do you have difficulty concentrating, remembering or making decisions? No       Age-appropriate Screening Schedule:  Refer to the list below for future screening recommendations based on patient's age, sex and/or medical conditions. Orders for these recommended tests are listed in the plan section. The patient has been provided with a written plan.    Health Maintenance   Topic Date Due   • COLORECTAL CANCER SCREENING  Never done   • ZOSTER VACCINE (1 of 2) Never done   • TDAP/TD VACCINES (2 - Td or Tdap) 01/02/2023   • DIABETIC EYE EXAM  03/16/2023   • COVID-19 Vaccine (4 - Booster for Pfizer series) 05/03/2024 (Originally 1/26/2022)   • Pneumococcal Vaccine 0-64 (2 - PCV) 05/03/2024 (Originally 2/28/2023)   • INFLUENZA VACCINE  08/01/2023   • DIABETIC FOOT EXAM  11/02/2023   • HEMOGLOBIN A1C  11/03/2023   • URINE MICROALBUMIN  03/09/2024   • ANNUAL WELLNESS VISIT  05/03/2024   • LIPID PANEL  05/03/2024   • HEPATITIS C SCREENING  Completed     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  Will consider Tdap and Shingrix vaccine at pharmacy.             CMS Preventative Services Quick Reference  Risk Factors Identified During Encounter  Fall Risk-High or Moderate: Discussed Fall Prevention in the home and Information on Fall Prevention Shared in After Visit Summary  Immunizations Discussed/Encouraged: Tdap,  Prevnar 20 (Pneumococcal 20-valent conjugate), Shingrix and COVID19  The above risks/problems have been discussed with the patient.    Discussed moderate risk for falls and recommended avoiding throw rugs, installing grab bars in bathroom, making sure have handrails on steps, etc.    Pertinent information has been shared with the patient in the After Visit Summary.  An After Visit Summary and PPPS were made available to the patient.    Follow Up:   Next Medicare Wellness visit to be scheduled in 1 year.     Additional E&M Note during same encounter follows:  Patient has multiple medical problems which are significant and separately identifiable that require additional work above and beyond the Medicare Wellness Visit.      Chief Complaint  Annual Exam    Subjective      HPI  Chad VIVIANE Davidson Jr. is also being seen today for follow up DM2: takes Metformin twice daily and Glimperide twice daily; last A1c 10.0%; could not afford Farxiga/Jardiance; has not been monitoring blood sugar recently; stays active on farm; watches carbs/sugars in diet; no numbness or tingling; had recent eye exam at Dr. Haq in [place], [place]; declines statin; has lost some weight.     F/U HTN: takes Amlodipine, Benazepril, and HCTZ daily; does not monitor BP; no headaches; no orthostasis; no swelling.     F/U gout: takes Allopurinol daily; no gout flares.     F/U microalbuminuria: renal stopped Naproxen due to elevated microalbumin; had recent follow up and recommended SGLT-2 but cannot afford; to follow up in 1 year.     F/U lower back pain: takes Tylenol as needed and helps some; takes Tylenol 1000 mg daily as needed; takes Flexeril as needed for flares; has not taken Flexeril recently.    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 Stroud and had removed; has follow up next month.     Needs repeat colonoscopy--needs referral.       Review of Systems   Constitutional: Negative for appetite change, chills, fatigue and fever.   HENT:  "Negative for ear pain, sinus pressure, sore throat and trouble swallowing.    Eyes: Negative for discharge and visual disturbance.   Respiratory: Negative for cough, chest tightness and shortness of breath. Apnea: declines sleep study.    Cardiovascular: Negative for chest pain and palpitations.   Gastrointestinal: Negative for abdominal pain, blood in stool, constipation and diarrhea.   Endocrine: Negative for cold intolerance, heat intolerance and polydipsia.   Genitourinary: Negative for dysuria. Frequency: at night.   Musculoskeletal: Arthralgias: has arthritis. Back pain: no change in chronic lower back pain, takes Tylenol as needed and helps some.   Skin: Negative for rash.   Neurological: Negative for syncope and weakness.   Hematological: Does not bruise/bleed easily.   Psychiatric/Behavioral: Negative for dysphoric mood. The patient is not nervous/anxious.        Objective   Vital Signs:  /72 (BP Location: Right arm, Patient Position: Sitting, Cuff Size: Large Adult)   Pulse 105   Temp 98.3 °F (36.8 °C) (Temporal)   Ht 182.9 cm (72\")   Wt 126 kg (277 lb)   SpO2 96%   BMI 37.57 kg/m²     Physical Exam  Vitals and nursing note reviewed.   Constitutional:       General: He is not in acute distress.     Appearance: He is well-developed and well-groomed. He is not diaphoretic.   HENT:      Head: Normocephalic and atraumatic.      Jaw: No tenderness or pain on movement.      Right Ear: Tympanic membrane and external ear normal. No decreased hearing noted.      Left Ear: Tympanic membrane and external ear normal. No decreased hearing noted.      Nose: Nose normal.      Right Sinus: No maxillary sinus tenderness or frontal sinus tenderness.      Left Sinus: No maxillary sinus tenderness or frontal sinus tenderness.      Mouth/Throat:      Mouth: Mucous membranes are moist.      Pharynx: No oropharyngeal exudate or posterior oropharyngeal erythema.   Eyes:      Extraocular Movements: Extraocular " movements intact.      Conjunctiva/sclera: Conjunctivae normal.      Pupils: Pupils are equal, round, and reactive to light.   Neck:      Thyroid: No thyromegaly.      Vascular: No carotid bruit.      Trachea: No tracheal deviation.   Cardiovascular:      Rate and Rhythm: Normal rate and regular rhythm.      Pulses: Normal pulses.      Heart sounds: Normal heart sounds. No murmur heard.  Pulmonary:      Effort: Pulmonary effort is normal. No respiratory distress.      Breath sounds: Normal breath sounds.   Abdominal:      General: Bowel sounds are normal.      Palpations: Abdomen is soft. There is no hepatomegaly or splenomegaly.      Tenderness: There is no abdominal tenderness. There is no guarding.      Hernia: A hernia is present. Hernia is present in the umbilical area (reducible; pt declines referral; ER warnings given).   Musculoskeletal:         General: Normal range of motion.      Cervical back: Normal range of motion and neck supple. No bony tenderness.      Thoracic back: No bony tenderness.      Lumbar back: No bony tenderness.      Right lower leg: No edema.      Left lower leg: No edema.   Lymphadenopathy:      Cervical: No cervical adenopathy.   Skin:     General: Skin is warm and dry.      Findings: No rash.   Neurological:      Mental Status: He is alert and oriented to person, place, and time.      Cranial Nerves: No cranial nerve deficit.      Motor: Motor function is intact.      Coordination: Coordination normal.      Gait: Gait normal.      Deep Tendon Reflexes: Reflexes are normal and symmetric.   Psychiatric:         Mood and Affect: Mood normal.         Behavior: Behavior normal.         Thought Content: Thought content normal.         Cognition and Memory: Cognition normal.         Judgment: Judgment normal.              1/30/23 BMP WNL except glucose 308  11/2/22 uric acid 4.7, A1c 10.4%; lipid panel: , Trig 378, HDL 34, ; CMP WNL except glucose 288, sodium 133, chloride 95,  calcium 10.4         Assessment and Plan  Diagnoses and all orders for this visit:    1. Encounter for Medicare annual wellness exam (Primary)  -     Comprehensive Metabolic Panel  -     Lipid Panel With LDL / HDL Ratio  -     Hemoglobin A1c  -     CBC & Differential    2. Uncontrolled type 2 diabetes mellitus with hyperglycemia  Assessment & Plan:  Diabetes is pending lab results.   Continue current treatment regimen.  Regular aerobic exercise.  Reminded to get yearly retinal exam.  Diabetes will be reassessed in 3 months.  Continue Metformin and Glimepiride twice daily.    Orders:  -     Comprehensive Metabolic Panel  -     Lipid Panel With LDL / HDL Ratio  -     Hemoglobin A1c    3. Primary hypertension  Assessment & Plan:  Hypertension is stable.  Regular aerobic exercise.  Continue current medications.  Ambulatory blood pressure monitoring.  Blood pressure will be reassessed in 3 months.  Continue Amlodipine, Benazepril, and HCTZ daily.    Orders:  -     Comprehensive Metabolic Panel  -     Lipid Panel With LDL / HDL Ratio  -     CBC & Differential    4. Mixed hyperlipidemia  Assessment & Plan:  Declines statin.  Continue to work on healthy diet and exercise.    Orders:  -     Comprehensive Metabolic Panel  -     Lipid Panel With LDL / HDL Ratio    5. Hypercalcemia  -     Comprehensive Metabolic Panel  -     Vitamin D,25-Hydroxy    6. Prostate cancer screening  -     PSA Screen    7. Colon cancer screening  -     Ambulatory Referral to Gastroenterology    8. Persistent proteinuria due to type 2 diabetes mellitus  Assessment & Plan:  Follow up as scheduled with renal.      9. Class 2 severe obesity with serious comorbidity and body mass index (BMI) of 37.0 to 37.9 in adult, unspecified obesity type  Assessment & Plan:  Patient's (Body mass index is 37.57 kg/m².) indicates that they are morbidly/severely obese (BMI > 40 or > 35 with obesity - related health condition) with health conditions that include  hypertension, diabetes mellitus and dyslipidemias . Weight is improving with lifestyle modifications. BMI  is above average; BMI management plan is completed. We discussed low calorie, low carb based diet program, portion control and increasing exercise.       10. Gout, unspecified cause, unspecified chronicity, unspecified site  Assessment & Plan:  Stable.  Continue Allopurinol daily.      11. Chronic low back pain, unspecified back pain laterality, unspecified whether sciatica present  Assessment & Plan:  Continue Tylenol as needed.      Other orders  -     PSA Screen           Follow Up  Return in about 4 months (around 9/3/2023) for Recheck. or sooner if problems or concerns.  Will send refills of medications to [facility] for 90 day supply pending lab results.    Patient was given instructions and counseling regarding his condition or for health maintenance advice. Please see specific information pulled into the AVS if appropriate.

## 2023-05-03 NOTE — PATIENT INSTRUCTIONS
Continue to monitor your blood sugar once daily before a meal and record results.  Continue to monitor your blood pressure periodically and record results.  Continue to work on healthy diet and exercise.  Follow up pending lab results.  Follow up in 4 months, or sooner if problems or concerns.       Medicare Wellness  Personal Prevention Plan of Service     Date of Office Visit:    Encounter Provider:  AGUSTO Castillo  Place of Service:  [facility]  Patient Name: Chad Davidson Jr.  :  1960    As part of the Medicare Wellness portion of your visit today, we are providing you with this personalized preventive plan of services (PPPS). This plan is based upon recommendations of the [place] Preventive Services Task Force (USPSTF) and the Advisory Committee on Immunization Practices (ACIP).    This lists the preventive care services that should be considered, and provides dates of when you are due. Items listed as completed are up-to-date and do not require any further intervention.    Health Maintenance   Topic Date Due    COLORECTAL CANCER SCREENING  Never done    ZOSTER VACCINE (1 of 2) Never done    TDAP/TD VACCINES (2 - Td or Tdap) 2023    ANNUAL WELLNESS VISIT  2023    DIABETIC EYE EXAM  2023    HEMOGLOBIN A1C  2023    COVID-19 Vaccine (4 - Booster for Pfizer series) 2024 (Originally 2022)    Pneumococcal Vaccine 0-64 (2 - PCV) 2024 (Originally 2023)    INFLUENZA VACCINE  2023    DIABETIC FOOT EXAM  2023    LIPID PANEL  2023    URINE MICROALBUMIN  2024    HEPATITIS C SCREENING  Completed       Orders Placed This Encounter   Procedures    Comprehensive Metabolic Panel     Order Specific Question:   Release to patient     Answer:   Routine Release    Lipid Panel With LDL / HDL Ratio     Order Specific Question:   Release to patient     Answer:   Routine Release    Hemoglobin A1c     Order Specific  Question:   Release to patient     Answer:   Routine Release    Vitamin D,25-Hydroxy     Order Specific Question:   Release to patient     Answer:   Routine Release    PSA Screen     Order Specific Question:   Release to patient     Answer:   Routine Release    CBC & Differential     Order Specific Question:   Manual Differential     Answer:   No       Return in about 4 months (around 9/3/2023) for Recheck.

## 2023-05-04 LAB
25(OH)D3+25(OH)D2 SERPL-MCNC: 38.9 NG/ML (ref 30–100)
ALBUMIN SERPL-MCNC: 4.7 G/DL (ref 3.8–4.8)
ALBUMIN/GLOB SERPL: 2 {RATIO} (ref 1.2–2.2)
ALP SERPL-CCNC: 101 IU/L (ref 44–121)
ALT SERPL-CCNC: 36 IU/L (ref 0–44)
AST SERPL-CCNC: 25 IU/L (ref 0–40)
BASOPHILS # BLD AUTO: 0.1 X10E3/UL (ref 0–0.2)
BASOPHILS NFR BLD AUTO: 1 %
BILIRUB SERPL-MCNC: 0.9 MG/DL (ref 0–1.2)
BUN SERPL-MCNC: 16 MG/DL (ref 8–27)
BUN/CREAT SERPL: 21 (ref 10–24)
CALCIUM SERPL-MCNC: 10.5 MG/DL (ref 8.6–10.2)
CHLORIDE SERPL-SCNC: 101 MMOL/L (ref 96–106)
CHOLEST SERPL-MCNC: 199 MG/DL (ref 100–199)
CO2 SERPL-SCNC: 22 MMOL/L (ref 20–29)
CREAT SERPL-MCNC: 0.77 MG/DL (ref 0.76–1.27)
EGFRCR SERPLBLD CKD-EPI 2021: 101 ML/MIN/1.73
EOSINOPHIL # BLD AUTO: 0.5 X10E3/UL (ref 0–0.4)
EOSINOPHIL NFR BLD AUTO: 6 %
ERYTHROCYTE [DISTWIDTH] IN BLOOD BY AUTOMATED COUNT: 13.3 % (ref 11.6–15.4)
GLOBULIN SER CALC-MCNC: 2.4 G/DL (ref 1.5–4.5)
GLUCOSE SERPL-MCNC: 168 MG/DL (ref 70–99)
HBA1C MFR BLD: 9.5 % (ref 4.8–5.6)
HCT VFR BLD AUTO: 48.9 % (ref 37.5–51)
HDLC SERPL-MCNC: 40 MG/DL
HGB BLD-MCNC: 17 G/DL (ref 13–17.7)
IMM GRANULOCYTES # BLD AUTO: 0 X10E3/UL (ref 0–0.1)
IMM GRANULOCYTES NFR BLD AUTO: 0 %
LDLC SERPL CALC-MCNC: 126 MG/DL (ref 0–99)
LDLC/HDLC SERPL: 3.2 RATIO (ref 0–3.6)
LYMPHOCYTES # BLD AUTO: 2.4 X10E3/UL (ref 0.7–3.1)
LYMPHOCYTES NFR BLD AUTO: 27 %
MCH RBC QN AUTO: 31.1 PG (ref 26.6–33)
MCHC RBC AUTO-ENTMCNC: 34.8 G/DL (ref 31.5–35.7)
MCV RBC AUTO: 90 FL (ref 79–97)
MONOCYTES # BLD AUTO: 0.6 X10E3/UL (ref 0.1–0.9)
MONOCYTES NFR BLD AUTO: 7 %
NEUTROPHILS # BLD AUTO: 5.3 X10E3/UL (ref 1.4–7)
NEUTROPHILS NFR BLD AUTO: 59 %
PLATELET # BLD AUTO: 429 X10E3/UL (ref 150–450)
POTASSIUM SERPL-SCNC: 4.4 MMOL/L (ref 3.5–5.2)
PROT SERPL-MCNC: 7.1 G/DL (ref 6–8.5)
PSA SERPL-MCNC: 0.7 NG/ML (ref 0–4)
RBC # BLD AUTO: 5.46 X10E6/UL (ref 4.14–5.8)
SODIUM SERPL-SCNC: 138 MMOL/L (ref 134–144)
TRIGL SERPL-MCNC: 184 MG/DL (ref 0–149)
VLDLC SERPL CALC-MCNC: 33 MG/DL (ref 5–40)
WBC # BLD AUTO: 9 X10E3/UL (ref 3.4–10.8)

## 2023-05-05 DIAGNOSIS — E11.65 UNCONTROLLED TYPE 2 DIABETES MELLITUS WITH HYPERGLYCEMIA: Primary | ICD-10-CM

## 2023-05-05 DIAGNOSIS — I10 PRIMARY HYPERTENSION: ICD-10-CM

## 2023-05-05 DIAGNOSIS — M10.9 GOUT, UNSPECIFIED CAUSE, UNSPECIFIED CHRONICITY, UNSPECIFIED SITE: ICD-10-CM

## 2023-05-05 DIAGNOSIS — E11.65 UNCONTROLLED TYPE 2 DIABETES MELLITUS WITH HYPERGLYCEMIA: ICD-10-CM

## 2023-05-05 RX ORDER — AMLODIPINE BESYLATE 10 MG/1
10 TABLET ORAL DAILY
Qty: 90 TABLET | Refills: 1 | Status: SHIPPED | OUTPATIENT
Start: 2023-05-05

## 2023-05-05 RX ORDER — HYDROCHLOROTHIAZIDE 12.5 MG/1
12.5 CAPSULE, GELATIN COATED ORAL EVERY MORNING
Qty: 90 CAPSULE | Refills: 1 | Status: SHIPPED | OUTPATIENT
Start: 2023-05-05

## 2023-05-05 RX ORDER — ALLOPURINOL 300 MG/1
300 TABLET ORAL DAILY
Qty: 90 TABLET | Refills: 1 | Status: SHIPPED | OUTPATIENT
Start: 2023-05-05

## 2023-05-05 RX ORDER — BENAZEPRIL HYDROCHLORIDE 20 MG/1
20 TABLET ORAL DAILY
Qty: 90 TABLET | Refills: 1 | Status: SHIPPED | OUTPATIENT
Start: 2023-05-05

## 2023-05-05 RX ORDER — GLIMEPIRIDE 4 MG/1
4 TABLET ORAL 2 TIMES DAILY WITH MEALS
Qty: 180 TABLET | Refills: 1 | Status: SHIPPED | OUTPATIENT
Start: 2023-05-05

## 2023-05-06 PROBLEM — E83.52 HYPERCALCEMIA: Status: ACTIVE | Noted: 2023-05-06

## 2023-05-06 PROBLEM — K13.79 ACQUIRED DYSPLASIA OF ORAL CAVITY: Status: RESOLVED | Noted: 2023-01-27 | Resolved: 2023-05-06

## 2023-05-06 PROBLEM — E11.29 PERSISTENT PROTEINURIA DUE TO TYPE 2 DIABETES MELLITUS: Status: ACTIVE | Noted: 2022-03-03

## 2023-05-06 PROBLEM — K13.79 ACQUIRED DYSPLASIA OF ORAL CAVITY: Status: ACTIVE | Noted: 2023-01-27

## 2023-05-06 PROBLEM — R80.9 PERSISTENT PROTEINURIA DUE TO TYPE 2 DIABETES MELLITUS: Status: ACTIVE | Noted: 2022-03-03

## 2023-05-06 NOTE — ASSESSMENT & PLAN NOTE
Diabetes is pending lab results.   Continue current treatment regimen.  Regular aerobic exercise.  Reminded to get yearly retinal exam.  Diabetes will be reassessed in 3 months.  Continue Metformin and Glimepiride twice daily.

## 2023-05-06 NOTE — ASSESSMENT & PLAN NOTE
Patient's (Body mass index is 37.57 kg/m².) indicates that they are morbidly/severely obese (BMI > 40 or > 35 with obesity - related health condition) with health conditions that include hypertension, diabetes mellitus and dyslipidemias . Weight is improving with lifestyle modifications. BMI  is above average; BMI management plan is completed. We discussed low calorie, low carb based diet program, portion control and increasing exercise.

## 2023-05-10 DIAGNOSIS — N52.9 ERECTILE DYSFUNCTION, UNSPECIFIED ERECTILE DYSFUNCTION TYPE: ICD-10-CM

## 2023-05-10 RX ORDER — SILDENAFIL 100 MG/1
100 TABLET, FILM COATED ORAL DAILY PRN
Qty: 30 TABLET | Refills: 0 | Status: SHIPPED | OUTPATIENT
Start: 2023-05-10

## 2023-05-10 NOTE — TELEPHONE ENCOUNTER
Rx Refill Note  Requested Prescriptions     Pending Prescriptions Disp Refills   • sildenafil (VIAGRA) 100 MG tablet 30 tablet 0     Sig: Take 1 tablet by mouth Daily As Needed for Erectile Dysfunction.      Last office visit with prescribing clinician: 5/3/2023   Last telemedicine visit with prescribing clinician: 5/10/2023   Next office visit with prescribing clinician: 9/8/2023                         Would you like a call back once the refill request has been completed: [] Yes [] No    If the office needs to give you a call back, can they leave a voicemail: [] Yes [] No    Cindy Berry MA  05/10/23, 16:24 EDT

## 2023-05-17 DIAGNOSIS — N52.9 ERECTILE DYSFUNCTION, UNSPECIFIED ERECTILE DYSFUNCTION TYPE: ICD-10-CM

## 2023-05-17 RX ORDER — SILDENAFIL 100 MG/1
100 TABLET, FILM COATED ORAL DAILY PRN
Qty: 30 TABLET | Refills: 0 | OUTPATIENT
Start: 2023-05-17

## 2023-09-18 DIAGNOSIS — N52.9 ERECTILE DYSFUNCTION, UNSPECIFIED ERECTILE DYSFUNCTION TYPE: ICD-10-CM

## 2023-09-18 RX ORDER — SILDENAFIL 100 MG/1
TABLET, FILM COATED ORAL
Qty: 30 TABLET | Refills: 0 | Status: SHIPPED | OUTPATIENT
Start: 2023-09-18

## 2023-09-18 NOTE — TELEPHONE ENCOUNTER
Rx Refill Note  Requested Prescriptions     Pending Prescriptions Disp Refills    sildenafil (VIAGRA) 100 MG tablet [Pharmacy Med Name: Sildenafil Citrate 100 MG Oral Tablet] 30 tablet 0     Sig: TAKE 1 TABLET BY MOUTH ONCE DAILY AS NEEDED FOR ERECTILE DYSFUNCTION      Last office visit with prescribing clinician: 5/3/2023   Last telemedicine visit with prescribing clinician: Visit date not found   Next office visit with prescribing clinician: 10/25/2023                         Would you like a call back once the refill request has been completed: [] Yes [] No    If the office needs to give you a call back, can they leave a voicemail: [] Yes [] No    Cindy Berry MA  09/18/23, 12:00 EDT    Patient needs a appointment will send in one more 30 day

## 2023-10-25 ENCOUNTER — OFFICE VISIT (OUTPATIENT)
Dept: FAMILY MEDICINE CLINIC | Facility: CLINIC | Age: 63
End: 2023-10-25
Payer: MEDICARE

## 2023-10-25 VITALS
OXYGEN SATURATION: 99 % | HEART RATE: 78 BPM | BODY MASS INDEX: 37.93 KG/M2 | WEIGHT: 280 LBS | SYSTOLIC BLOOD PRESSURE: 128 MMHG | HEIGHT: 72 IN | DIASTOLIC BLOOD PRESSURE: 70 MMHG

## 2023-10-25 DIAGNOSIS — R80.9 PERSISTENT PROTEINURIA DUE TO TYPE 2 DIABETES MELLITUS: ICD-10-CM

## 2023-10-25 DIAGNOSIS — G89.29 CHRONIC LOW BACK PAIN, UNSPECIFIED BACK PAIN LATERALITY, UNSPECIFIED WHETHER SCIATICA PRESENT: ICD-10-CM

## 2023-10-25 DIAGNOSIS — E66.01 CLASS 2 SEVERE OBESITY WITH SERIOUS COMORBIDITY AND BODY MASS INDEX (BMI) OF 38.0 TO 38.9 IN ADULT, UNSPECIFIED OBESITY TYPE: ICD-10-CM

## 2023-10-25 DIAGNOSIS — E11.29 PERSISTENT PROTEINURIA DUE TO TYPE 2 DIABETES MELLITUS: ICD-10-CM

## 2023-10-25 DIAGNOSIS — Z23 ENCOUNTER FOR IMMUNIZATION: ICD-10-CM

## 2023-10-25 DIAGNOSIS — M10.9 GOUT, UNSPECIFIED CAUSE, UNSPECIFIED CHRONICITY, UNSPECIFIED SITE: ICD-10-CM

## 2023-10-25 DIAGNOSIS — I10 PRIMARY HYPERTENSION: ICD-10-CM

## 2023-10-25 DIAGNOSIS — E83.52 HYPERCALCEMIA: ICD-10-CM

## 2023-10-25 DIAGNOSIS — E78.2 MIXED HYPERLIPIDEMIA: ICD-10-CM

## 2023-10-25 DIAGNOSIS — M54.50 CHRONIC LOW BACK PAIN, UNSPECIFIED BACK PAIN LATERALITY, UNSPECIFIED WHETHER SCIATICA PRESENT: ICD-10-CM

## 2023-10-25 DIAGNOSIS — E11.65 UNCONTROLLED TYPE 2 DIABETES MELLITUS WITH HYPERGLYCEMIA: Primary | ICD-10-CM

## 2023-10-25 NOTE — PROGRESS NOTES
Answers submitted by the patient for this visit:  Primary Reason for Visit (Submitted on 10/18/2023)  What is the primary reason for your visit?: Physical--too soon today    Subjective   Chad Davidson Jr. is a 63 y.o. male.     Chief Complaint   Patient presents with    Med Refill    Diabetes     Follow up    Hypertension     Follow up       History of Present Illness   Patient presents for follow up DM2: takes Metformin and Glimperide twice daily; last A1c 9.5%; increased dose of Glimepiride after last labs; could not afford Farxiga/Jardiance; monitors blood sugar on occasion, will fast if blood sugar gets up to 200; watches intake of carbs/sugars some; exercise helps blood sugar; [place] farm; no numbness/tingling; had recent eye exam at Dr. Haq in [place], [place].     F/U HTN: takes Amlodipine, Benazepril, and HCTZ daily; does not monitor BP; no headaches; no orthostasis; no swelling.    F/U Hypercalcemia: stopped taking MVI; needs repeat labs today.     F/U gout: takes Allopurinol daily; no gout flares for long time.     F/U microalbuminuria: renal stopped Naproxen due to elevated microalbumin; at last follow up recommended SGLT-2 but cannot afford; to follow up in March 2024.     F/U lower back pain: takes Tylenol as needed and helps some; takes Flexeril as needed for flares and helps; has not taken Flexeril recently.     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 Stroud and had removed about 10 months ago.     Needs repeat colonoscopy--declines until next year; no constipation or diarrhea; no blood in BM.       The following portions of the patient's history were reviewed and updated as appropriate: allergies, current medications, past family history, past medical history, past social history, past surgical history and problem list.    Current Outpatient Medications on File Prior to Visit   Medication Sig    allopurinol (ZYLOPRIM) 300 MG tablet Take 1 tablet by mouth Daily.    amLODIPine (NORVASC)  10 MG tablet Take 1 tablet by mouth Daily.    aspirin 81 MG EC tablet Take 1 tablet by mouth Daily.    benazepril (LOTENSIN) 20 MG tablet Take 1 tablet by mouth Daily.    cyclobenzaprine (FLEXERIL) 10 MG tablet Take 1 tablet by mouth three times daily as needed for muscle spasm    glimepiride (Amaryl) 4 MG tablet Take 1 tablet by mouth 2 (Two) Times a Day With Meals.    hydroCHLOROthiazide (MICROZIDE) 12.5 MG capsule Take 1 capsule by mouth Every Morning.    metFORMIN (GLUCOPHAGE) 500 MG tablet Take 2 tablets by mouth 2 (Two) Times a Day With Meals.    sildenafil (VIAGRA) 100 MG tablet TAKE 1 TABLET BY MOUTH ONCE DAILY AS NEEDED FOR ERECTILE DYSFUNCTION     No current facility-administered medications on file prior to visit.        Past Medical History:   Diagnosis Date    Acquired dysplasia of oral cavity 2023    Formatting of this note might be different from the original. Added automatically from request for surgery [id number]    Arthritis     Brown recluse spider bite     with necrotizing fasciitis    COVID-19 virus infection 2021    Diabetes mellitus     Erectile dysfunction     Hypertension     Low back pain     Parotitis 2019       Past Surgical History:   Procedure Laterality Date    BACK SURGERY      COLONOSCOPY  approx     2 polyps removed    JOINT REPLACEMENT      REPAIR PATELLAR TENDON Right     REPLACEMENT TOTAL KNEE Bilateral        Family History   Problem Relation Age of Onset    Diabetes Father     Alcohol abuse Father     Hepatitis Father     Diabetes Sister        Social History     Socioeconomic History    Marital status:    Tobacco Use    Smoking status: Former     Packs/day: 2.50     Years: 20.00     Additional pack years: 0.00     Total pack years: 50.00     Types: Cigarettes     Quit date: 1995     Years since quittin.8    Smokeless tobacco: Never   Vaping Use    Vaping Use: Never used   Substance and Sexual Activity    Alcohol use: Not Currently      "Comment: no ETOH in last 6 years    Drug use: Never    Sexual activity: Yes       Review of Systems   Constitutional:  Negative for appetite change, chills, fatigue, fever, unexpected weight gain and unexpected weight loss.   HENT:  Negative for ear pain, sinus pressure, sore throat and trouble swallowing.    Eyes:  Negative for blurred vision.   Respiratory:  Negative for cough, chest tightness and shortness of breath.    Cardiovascular:  Negative for chest pain and palpitations.   Endocrine: Negative for cold intolerance, heat intolerance and polydipsia.   Genitourinary:  Positive for frequency and nocturia (gets up twice per night to urinate; no change in urinary stream). Negative for dysuria.   Musculoskeletal:  Negative for arthralgias. Back pain: see HPI; will have flares when does too much.  Skin:  Negative for rash.   Neurological:  Negative for syncope and weakness.   Hematological:  Does not bruise/bleed easily.   Psychiatric/Behavioral:  Negative for depressed mood. The patient is not nervous/anxious.        Objective   Vitals:    10/25/23 1047   BP: 128/70   BP Location: Right arm   Patient Position: Sitting   Cuff Size: Adult   Pulse: 78   SpO2: 99%   Weight: 127 kg (280 lb)   Height: 182.9 cm (72\")     Body mass index is 37.97 kg/m².    Physical Exam  Vitals and nursing note reviewed.   Constitutional:       General: He is not in acute distress.     Appearance: He is well-developed and well-groomed. He is not diaphoretic.   HENT:      Head: Normocephalic.      Right Ear: External ear normal.      Left Ear: External ear normal.   Eyes:      Conjunctiva/sclera: Conjunctivae normal.   Neck:      Vascular: No carotid bruit.   Cardiovascular:      Rate and Rhythm: Normal rate and regular rhythm.      Pulses: Normal pulses.      Heart sounds: Normal heart sounds. No murmur heard.  Pulmonary:      Effort: Pulmonary effort is normal. No respiratory distress.      Breath sounds: Normal breath sounds. "   Abdominal:      General: Bowel sounds are normal.      Palpations: Abdomen is soft. There is no hepatomegaly or splenomegaly.      Tenderness: There is no abdominal tenderness. There is no guarding.      Hernia: A hernia is present. There is no hernia in the umbilical area (reducible; declines referral at this time; ER warnings given).   Musculoskeletal:      Cervical back: Normal range of motion and neck supple.      Right lower leg: Right lower leg edema: trace ankle.      Left lower leg: Left lower leg edema: trace ankle.   Skin:     General: Skin is warm and dry.      Findings: No rash.   Neurological:      Mental Status: He is alert and oriented to person, place, and time.      Gait: Gait normal.   Psychiatric:         Mood and Affect: Mood normal.         Behavior: Behavior normal.         Thought Content: Thought content normal.         Cognition and Memory: Cognition normal.         Judgment: Judgment normal.         Lab Results   Component Value Date    WBC 9.0 05/03/2023    RBC 5.46 05/03/2023    HGB 17.0 05/03/2023    HCT 48.9 05/03/2023    MCV 90 05/03/2023    MCH 31.1 05/03/2023    MCHC 34.8 05/03/2023    RDW 13.3 05/03/2023    MPV 10.1 06/24/2019     05/03/2023    NEUTRORELPCT 59 05/03/2023    LYMPHORELPCT 27 05/03/2023    MONORELPCT 7 05/03/2023    EOSRELPCT 6 05/03/2023    BASORELPCT 1 05/03/2023    AUTOIGPER 0.2 (H) 06/24/2019    NEUTROABS 5.3 05/03/2023    LYMPHSABS 2.4 05/03/2023    MONOSABS 0.6 05/03/2023    EOSABS 0.5 (H) 05/03/2023    BASOSABS 0.1 05/03/2023    AUTOIGNUM 0.02 06/24/2019    NRBC 0 06/24/2019     Lab Results   Component Value Date    GLUCOSE 168 (H) 05/03/2023    BUN 16 05/03/2023    CREATININE 0.77 05/03/2023    EGFRIFNONA 97 11/09/2021    EGFRIFAFRI 112 11/09/2021    BCR 21 05/03/2023    K 4.4 05/03/2023    CO2 22 05/03/2023    CALCIUM 10.5 (H) 05/03/2023    PROTENTOTREF 7.1 05/03/2023    ALBUMIN 4.7 05/03/2023    LABIL2 2.0 05/03/2023    AST 25 05/03/2023    ALT 36  05/03/2023      Lab Results   Component Value Date    CHLPL 199 05/03/2023    TRIG 184 (H) 05/03/2023    HDL 40 05/03/2023    VLDL 33 05/03/2023     (H) 05/03/2023     Lab Results   Component Value Date    HGBA1C 9.5 (H) 05/03/2023     Lab Results   Component Value Date    PSA 0.7 05/03/2023     Lab Results   Component Value Date    WBCU 0-2 10/23/2015    RBCUA None Seen 10/23/2015    BACTERIA None Seen 10/23/2015    LABPH 5.0 10/23/2015    COLORU Yellow 07/14/2015    CLARITYU Clear 10/23/2015    LEUKOCYTESUR Negative 10/23/2015    GLUCOSEU 3+ (A) 10/23/2015    BLOODU Negative 10/23/2015    BILIRUBINUR Negative 10/23/2015    NITRITEU Negative 10/23/2015          Assessment    Problem List Items Addressed This Visit       Chronic low back pain    Current Assessment & Plan     Continue Tylenol as needed.         Gout    Current Assessment & Plan     Stable.  Continue Allopurinol daily.         Relevant Orders    Uric Acid    Hyperlipidemia    Current Assessment & Plan     Declines statin.  Continue to work on healthy diet and exercise.         Relevant Orders    Comprehensive Metabolic Panel    Lipid Panel With LDL / HDL Ratio    Hypertension    Current Assessment & Plan     Hypertension is  stable .  Weight loss.  Regular aerobic exercise.  Continue current medications.  Ambulatory blood pressure monitoring.  Blood pressure will be reassessed at the next regular appointment.  Continue Amlodipine, Benazepril, and HCTZ daily.         Relevant Orders    Comprehensive Metabolic Panel    Class 2 severe obesity with serious comorbidity and body mass index (BMI) of 38.0 to 38.9 in adult    Current Assessment & Plan     Patient's (Body mass index is 37.97 kg/m².) indicates that they are morbidly/severely obese (BMI > 40 or > 35 with obesity - related health condition) with health conditions that include hypertension, diabetes mellitus, and dyslipidemias . Weight is unchanged. BMI  is above average; BMI management plan  is completed. We discussed low calorie, low carb based diet program, portion control, and increasing exercise.          Uncontrolled type 2 diabetes mellitus with hyperglycemia - Primary    Current Assessment & Plan     Diabetes is  pending lab results .   Continue current treatment regimen.  Regular aerobic exercise.  Reminded to get yearly retinal exam.  Diabetes will be reassessed in 3 months.  Continue Metformin and Glimepiride twice daily.         Relevant Orders    Hemoglobin A1c    Comprehensive Metabolic Panel    Lipid Panel With LDL / HDL Ratio    Persistent proteinuria due to type 2 diabetes mellitus    Current Assessment & Plan     Follow up as scheduled with renal.         Hypercalcemia    Relevant Orders    Comprehensive Metabolic Panel    PTH, Intact    Vitamin D,25-Hydroxy     Other Visit Diagnoses       Encounter for immunization        Relevant Orders    Fluzone (or Fluarix & Flulaval for VFC) >6mos (Completed)             Return in about 3 months (around 1/25/2024) for Recheck.or sooner if problems or concerns.

## 2023-10-26 DIAGNOSIS — M10.9 GOUT, UNSPECIFIED CAUSE, UNSPECIFIED CHRONICITY, UNSPECIFIED SITE: ICD-10-CM

## 2023-10-26 DIAGNOSIS — R80.9 PERSISTENT PROTEINURIA DUE TO TYPE 2 DIABETES MELLITUS: ICD-10-CM

## 2023-10-26 DIAGNOSIS — E11.29 PERSISTENT PROTEINURIA DUE TO TYPE 2 DIABETES MELLITUS: ICD-10-CM

## 2023-10-26 DIAGNOSIS — I10 PRIMARY HYPERTENSION: ICD-10-CM

## 2023-10-26 DIAGNOSIS — E11.65 UNCONTROLLED TYPE 2 DIABETES MELLITUS WITH HYPERGLYCEMIA: Primary | ICD-10-CM

## 2023-10-26 DIAGNOSIS — E11.65 UNCONTROLLED TYPE 2 DIABETES MELLITUS WITH HYPERGLYCEMIA: ICD-10-CM

## 2023-10-26 DIAGNOSIS — E55.9 VITAMIN D DEFICIENCY: Primary | ICD-10-CM

## 2023-10-26 LAB
25(OH)D3+25(OH)D2 SERPL-MCNC: 28.3 NG/ML (ref 30–100)
ALBUMIN SERPL-MCNC: 4.6 G/DL (ref 3.9–4.9)
ALBUMIN/GLOB SERPL: 1.9 {RATIO} (ref 1.2–2.2)
ALP SERPL-CCNC: 90 IU/L (ref 44–121)
ALT SERPL-CCNC: 36 IU/L (ref 0–44)
AST SERPL-CCNC: 21 IU/L (ref 0–40)
BILIRUB SERPL-MCNC: 0.8 MG/DL (ref 0–1.2)
BUN SERPL-MCNC: 19 MG/DL (ref 8–27)
BUN/CREAT SERPL: 24 (ref 10–24)
CALCIUM SERPL-MCNC: 10.2 MG/DL (ref 8.6–10.2)
CHLORIDE SERPL-SCNC: 100 MMOL/L (ref 96–106)
CHOLEST SERPL-MCNC: 189 MG/DL (ref 100–199)
CO2 SERPL-SCNC: 23 MMOL/L (ref 20–29)
CREAT SERPL-MCNC: 0.8 MG/DL (ref 0.76–1.27)
EGFRCR SERPLBLD CKD-EPI 2021: 99 ML/MIN/1.73
GLOBULIN SER CALC-MCNC: 2.4 G/DL (ref 1.5–4.5)
GLUCOSE SERPL-MCNC: 238 MG/DL (ref 70–99)
HBA1C MFR BLD: 10.4 % (ref 4.8–5.6)
HDLC SERPL-MCNC: 43 MG/DL
LDLC SERPL CALC-MCNC: 108 MG/DL (ref 0–99)
LDLC/HDLC SERPL: 2.5 RATIO (ref 0–3.6)
POTASSIUM SERPL-SCNC: 4.8 MMOL/L (ref 3.5–5.2)
PROT SERPL-MCNC: 7 G/DL (ref 6–8.5)
PTH-INTACT SERPL-MCNC: 23 PG/ML (ref 15–65)
SODIUM SERPL-SCNC: 136 MMOL/L (ref 134–144)
TRIGL SERPL-MCNC: 220 MG/DL (ref 0–149)
URATE SERPL-MCNC: 5.1 MG/DL (ref 3.8–8.4)
VLDLC SERPL CALC-MCNC: 38 MG/DL (ref 5–40)

## 2023-10-26 RX ORDER — HYDROCHLOROTHIAZIDE 12.5 MG/1
12.5 CAPSULE, GELATIN COATED ORAL EVERY MORNING
Qty: 90 CAPSULE | Refills: 1 | Status: SHIPPED | OUTPATIENT
Start: 2023-10-26

## 2023-10-26 RX ORDER — AMLODIPINE BESYLATE 10 MG/1
10 TABLET ORAL DAILY
Qty: 90 TABLET | Refills: 1 | Status: SHIPPED | OUTPATIENT
Start: 2023-10-26

## 2023-10-26 RX ORDER — ALLOPURINOL 300 MG/1
300 TABLET ORAL DAILY
Qty: 90 TABLET | Refills: 1 | Status: SHIPPED | OUTPATIENT
Start: 2023-10-26

## 2023-10-26 RX ORDER — BENAZEPRIL HYDROCHLORIDE 20 MG/1
20 TABLET ORAL DAILY
Qty: 90 TABLET | Refills: 1 | Status: SHIPPED | OUTPATIENT
Start: 2023-10-26

## 2023-10-26 RX ORDER — MULTIVIT-MIN/IRON/FOLIC ACID/K 18-600-40
2000 CAPSULE ORAL DAILY
Start: 2023-10-26

## 2023-10-26 RX ORDER — GLIMEPIRIDE 4 MG/1
4 TABLET ORAL 2 TIMES DAILY WITH MEALS
Qty: 180 TABLET | Refills: 1 | Status: SHIPPED | OUTPATIENT
Start: 2023-10-26

## 2023-10-26 NOTE — ASSESSMENT & PLAN NOTE
Hypertension is  stable .  Weight loss.  Regular aerobic exercise.  Continue current medications.  Ambulatory blood pressure monitoring.  Blood pressure will be reassessed at the next regular appointment.  Continue Amlodipine, Benazepril, and HCTZ daily.

## 2023-10-26 NOTE — ASSESSMENT & PLAN NOTE
Diabetes is  pending lab results .   Continue current treatment regimen.  Regular aerobic exercise.  Reminded to get yearly retinal exam.  Diabetes will be reassessed in 3 months.  Continue Metformin and Glimepiride twice daily.

## 2023-10-26 NOTE — ASSESSMENT & PLAN NOTE
Patient's (Body mass index is 37.97 kg/m².) indicates that they are morbidly/severely obese (BMI > 40 or > 35 with obesity - related health condition) with health conditions that include hypertension, diabetes mellitus, and dyslipidemias . Weight is unchanged. BMI  is above average; BMI management plan is completed. We discussed low calorie, low carb based diet program, portion control, and increasing exercise.

## 2023-11-10 ENCOUNTER — TELEPHONE (OUTPATIENT)
Dept: FAMILY MEDICINE CLINIC | Facility: CLINIC | Age: 63
End: 2023-11-10

## 2023-11-10 NOTE — TELEPHONE ENCOUNTER
Caller: Chad Davidson Jr.    Relationship: Self    Best call back number: [phone]    What is the best time to reach you: ANY    Who are you requesting to speak with (clinical staff, provider,  specific staff member): DEBBI FALCON      What was the call regarding: PATIENT STATED THAT HE HAS A FORM THAT NEEDS A SIGNATURE AND MEDICAL LICENSE NUMBER. PATIENT STATED THAT IT IS SO HE IS ABLE TO BUILD A PART PATH FROM [facility] TO HIS DOCK.    THIS IS A [employer] REQUIREMENT.       PATIENT STATED THAT YOU WILL HAVE TO LEAVE A VOICEMAIL

## 2023-11-13 ENCOUNTER — TELEPHONE (OUTPATIENT)
Dept: FAMILY MEDICINE CLINIC | Facility: CLINIC | Age: 63
End: 2023-11-13
Payer: MEDICARE

## 2023-11-13 NOTE — TELEPHONE ENCOUNTER
OK FOR HUB TO READ    LMTCB    PATIENT NEEDS TO CLARIFY WITH PAPERWORK    Does the  patient needs clearance to physically be able to build the cart path or if in his condition he needs to be able to have the path available.  Just need further clarification on this as well as if he has a particular form to fill out.  Thank you.

## 2023-11-20 NOTE — TELEPHONE ENCOUNTER
Spoke with patient over the phone; pt has been on permanent disability since 2016 due to right knee patellar tendon tear; pt had bilateral knee replacements in 2015 and then had patellar tendon repair on right in 2016 after knee replacement; pt has trouble walking if the terrain is uneven and cannot walk down steep hillsides; pt needs paper work completed to allow a cart path to be built; paper work completed.

## 2024-01-17 DIAGNOSIS — N52.9 ERECTILE DYSFUNCTION, UNSPECIFIED ERECTILE DYSFUNCTION TYPE: ICD-10-CM

## 2024-01-17 DIAGNOSIS — G89.29 CHRONIC LOW BACK PAIN, UNSPECIFIED BACK PAIN LATERALITY, UNSPECIFIED WHETHER SCIATICA PRESENT: ICD-10-CM

## 2024-01-17 DIAGNOSIS — M54.50 CHRONIC LOW BACK PAIN, UNSPECIFIED BACK PAIN LATERALITY, UNSPECIFIED WHETHER SCIATICA PRESENT: ICD-10-CM

## 2024-01-17 RX ORDER — CYCLOBENZAPRINE HCL 10 MG
TABLET ORAL
Qty: 30 TABLET | Refills: 0 | Status: SHIPPED | OUTPATIENT
Start: 2024-01-17

## 2024-01-17 RX ORDER — SILDENAFIL 100 MG/1
TABLET, FILM COATED ORAL
Qty: 30 TABLET | Refills: 0 | Status: SHIPPED | OUTPATIENT
Start: 2024-01-17

## 2024-02-01 DIAGNOSIS — I10 PRIMARY HYPERTENSION: ICD-10-CM

## 2024-02-01 RX ORDER — BENAZEPRIL HYDROCHLORIDE 20 MG/1
20 TABLET ORAL DAILY
Qty: 90 TABLET | Refills: 1 | Status: SHIPPED | OUTPATIENT
Start: 2024-02-01

## 2024-03-21 ENCOUNTER — TELEPHONE (OUTPATIENT)
Dept: FAMILY MEDICINE CLINIC | Facility: CLINIC | Age: 64
End: 2024-03-21
Payer: MEDICARE

## 2024-03-21 NOTE — TELEPHONE ENCOUNTER
HUB TO READ AND RELAY    Left message for patient to call office and schedule his medicare wellness, he is not due until after 5/4/2024

## 2024-03-27 DIAGNOSIS — G89.29 CHRONIC LOW BACK PAIN, UNSPECIFIED BACK PAIN LATERALITY, UNSPECIFIED WHETHER SCIATICA PRESENT: ICD-10-CM

## 2024-03-27 DIAGNOSIS — M54.50 CHRONIC LOW BACK PAIN, UNSPECIFIED BACK PAIN LATERALITY, UNSPECIFIED WHETHER SCIATICA PRESENT: ICD-10-CM

## 2024-03-27 RX ORDER — CYCLOBENZAPRINE HCL 10 MG
TABLET ORAL
Qty: 30 TABLET | Refills: 0 | OUTPATIENT
Start: 2024-03-27

## 2024-04-21 DIAGNOSIS — I10 PRIMARY HYPERTENSION: ICD-10-CM

## 2024-04-21 DIAGNOSIS — E11.65 UNCONTROLLED TYPE 2 DIABETES MELLITUS WITH HYPERGLYCEMIA: ICD-10-CM

## 2024-04-21 DIAGNOSIS — M10.9 GOUT, UNSPECIFIED CAUSE, UNSPECIFIED CHRONICITY, UNSPECIFIED SITE: ICD-10-CM

## 2024-04-22 RX ORDER — ALLOPURINOL 300 MG/1
300 TABLET ORAL DAILY
Qty: 30 TABLET | Refills: 0 | Status: SHIPPED | OUTPATIENT
Start: 2024-04-22

## 2024-04-22 RX ORDER — GLIMEPIRIDE 4 MG/1
4 TABLET ORAL 2 TIMES DAILY WITH MEALS
Qty: 60 TABLET | Refills: 0 | Status: SHIPPED | OUTPATIENT
Start: 2024-04-22

## 2024-04-22 RX ORDER — HYDROCHLOROTHIAZIDE 12.5 MG/1
12.5 CAPSULE, GELATIN COATED ORAL EVERY MORNING
Qty: 30 CAPSULE | Refills: 0 | Status: SHIPPED | OUTPATIENT
Start: 2024-04-22

## 2024-04-30 DIAGNOSIS — G89.29 CHRONIC LOW BACK PAIN, UNSPECIFIED BACK PAIN LATERALITY, UNSPECIFIED WHETHER SCIATICA PRESENT: ICD-10-CM

## 2024-04-30 DIAGNOSIS — M54.50 CHRONIC LOW BACK PAIN, UNSPECIFIED BACK PAIN LATERALITY, UNSPECIFIED WHETHER SCIATICA PRESENT: ICD-10-CM

## 2024-04-30 DIAGNOSIS — I10 PRIMARY HYPERTENSION: ICD-10-CM

## 2024-04-30 RX ORDER — CYCLOBENZAPRINE HCL 10 MG
TABLET ORAL
Qty: 30 TABLET | Refills: 0 | OUTPATIENT
Start: 2024-04-30

## 2024-04-30 RX ORDER — AMLODIPINE BESYLATE 10 MG/1
10 TABLET ORAL DAILY
Qty: 30 TABLET | Refills: 0 | Status: SHIPPED | OUTPATIENT
Start: 2024-04-30

## 2024-06-09 DIAGNOSIS — I10 PRIMARY HYPERTENSION: ICD-10-CM

## 2024-06-10 RX ORDER — AMLODIPINE BESYLATE 10 MG/1
10 TABLET ORAL DAILY
Qty: 30 TABLET | Refills: 0 | Status: SHIPPED | OUTPATIENT
Start: 2024-06-10 | End: 2024-06-14 | Stop reason: SDUPTHER

## 2024-06-10 NOTE — TELEPHONE ENCOUNTER
LOV             10/25/2023   NOV             (around 1/25/2024) for Recheck.   Last RF        4/30/24   Protocol       Not met    PT NEEDS AN APPT BEFORE GETTING ANY REFILLS    JN James/NATHAN

## 2024-06-14 ENCOUNTER — OFFICE VISIT (OUTPATIENT)
Dept: FAMILY MEDICINE CLINIC | Facility: CLINIC | Age: 64
End: 2024-06-14
Payer: MEDICARE

## 2024-06-14 VITALS
TEMPERATURE: 96.9 F | BODY MASS INDEX: 37.17 KG/M2 | HEIGHT: 72 IN | HEART RATE: 106 BPM | OXYGEN SATURATION: 98 % | DIASTOLIC BLOOD PRESSURE: 80 MMHG | SYSTOLIC BLOOD PRESSURE: 140 MMHG | WEIGHT: 274.4 LBS

## 2024-06-14 DIAGNOSIS — E78.2 MIXED HYPERLIPIDEMIA: ICD-10-CM

## 2024-06-14 DIAGNOSIS — Z00.00 ENCOUNTER FOR MEDICARE ANNUAL WELLNESS EXAM: Primary | ICD-10-CM

## 2024-06-14 DIAGNOSIS — M19.90 ARTHRITIS: ICD-10-CM

## 2024-06-14 DIAGNOSIS — I49.9 IRREGULAR HEARTBEAT: ICD-10-CM

## 2024-06-14 DIAGNOSIS — G89.29 CHRONIC LOW BACK PAIN, UNSPECIFIED BACK PAIN LATERALITY, UNSPECIFIED WHETHER SCIATICA PRESENT: ICD-10-CM

## 2024-06-14 DIAGNOSIS — I48.91 ATRIAL FIBRILLATION, UNSPECIFIED TYPE: ICD-10-CM

## 2024-06-14 DIAGNOSIS — Z12.11 COLON CANCER SCREENING: ICD-10-CM

## 2024-06-14 DIAGNOSIS — E11.21 TYPE 2 DIABETES MELLITUS WITH DIABETIC NEPHROPATHY, WITHOUT LONG-TERM CURRENT USE OF INSULIN: ICD-10-CM

## 2024-06-14 DIAGNOSIS — R80.9 MICROALBUMINURIA: ICD-10-CM

## 2024-06-14 DIAGNOSIS — E55.9 VITAMIN D DEFICIENCY: ICD-10-CM

## 2024-06-14 DIAGNOSIS — Z12.5 PROSTATE CANCER SCREENING: ICD-10-CM

## 2024-06-14 DIAGNOSIS — M10.9 GOUT, UNSPECIFIED CAUSE, UNSPECIFIED CHRONICITY, UNSPECIFIED SITE: ICD-10-CM

## 2024-06-14 DIAGNOSIS — I10 PRIMARY HYPERTENSION: ICD-10-CM

## 2024-06-14 DIAGNOSIS — M54.50 CHRONIC LOW BACK PAIN, UNSPECIFIED BACK PAIN LATERALITY, UNSPECIFIED WHETHER SCIATICA PRESENT: ICD-10-CM

## 2024-06-14 DIAGNOSIS — E11.65 UNCONTROLLED TYPE 2 DIABETES MELLITUS WITH HYPERGLYCEMIA: ICD-10-CM

## 2024-06-14 DIAGNOSIS — R00.0 TACHYCARDIA: ICD-10-CM

## 2024-06-14 DIAGNOSIS — N52.9 ERECTILE DYSFUNCTION, UNSPECIFIED ERECTILE DYSFUNCTION TYPE: ICD-10-CM

## 2024-06-14 RX ORDER — CYCLOBENZAPRINE HCL 10 MG
10 TABLET ORAL 3 TIMES DAILY PRN
Qty: 30 TABLET | Refills: 0 | Status: SHIPPED | OUTPATIENT
Start: 2024-06-14

## 2024-06-14 RX ORDER — MULTIVIT-MIN/IRON/FOLIC ACID/K 18-600-40
2000 CAPSULE ORAL DAILY
Qty: 90 CAPSULE | Refills: 2 | Status: SHIPPED | OUTPATIENT
Start: 2024-06-14

## 2024-06-14 RX ORDER — HYDROCHLOROTHIAZIDE 12.5 MG/1
12.5 CAPSULE, GELATIN COATED ORAL EVERY MORNING
Qty: 90 CAPSULE | Refills: 1 | Status: SHIPPED | OUTPATIENT
Start: 2024-06-14

## 2024-06-14 RX ORDER — SILDENAFIL 100 MG/1
100 TABLET, FILM COATED ORAL DAILY PRN
Qty: 30 TABLET | Refills: 0 | Status: SHIPPED | OUTPATIENT
Start: 2024-06-14

## 2024-06-14 RX ORDER — METOPROLOL SUCCINATE 25 MG/1
25 TABLET, EXTENDED RELEASE ORAL DAILY
Qty: 30 TABLET | Refills: 1 | Status: SHIPPED | OUTPATIENT
Start: 2024-06-14

## 2024-06-14 RX ORDER — ALLOPURINOL 300 MG/1
300 TABLET ORAL DAILY
Qty: 90 TABLET | Refills: 3 | Status: SHIPPED | OUTPATIENT
Start: 2024-06-14

## 2024-06-14 RX ORDER — AMLODIPINE BESYLATE 10 MG/1
10 TABLET ORAL DAILY
Qty: 90 TABLET | Refills: 2 | Status: SHIPPED | OUTPATIENT
Start: 2024-06-14

## 2024-06-14 RX ORDER — GLIMEPIRIDE 4 MG/1
4 TABLET ORAL 2 TIMES DAILY WITH MEALS
Qty: 180 TABLET | Refills: 1 | Status: SHIPPED | OUTPATIENT
Start: 2024-06-14

## 2024-06-14 RX ORDER — BENAZEPRIL HYDROCHLORIDE 20 MG/1
20 TABLET ORAL DAILY
Qty: 90 TABLET | Refills: 1 | Status: SHIPPED | OUTPATIENT
Start: 2024-06-14

## 2024-06-14 NOTE — PROGRESS NOTES
The ABCs of the Annual Wellness Visit  Subsequent Medicare Wellness Visit    Subjective    Chad Davidson Jr. is a 64 y.o. male who presents for a Subsequent Medicare Wellness Visit.    The following portions of the patient's history were reviewed and   updated as appropriate: allergies, current medications, past family history, past medical history, past social history, past surgical history, and problem list.    Compared to one year ago, the patient feels his physical   health is the same.    Compared to one year ago, the patient feels his mental   health is the same.    Recent Hospitalizations:  He was not admitted to the hospital during the last year.       Current Medical Providers:  Patient Care Team:  Eri David APRN as PCP - General (Family Medicine)  Jesus Haq OD (Optometry)  Jak Alvarez MD as Consulting Physician (Nephrology)  Yuniel Stroud MD as Consulting Physician (Otolaryngology)    Outpatient Medications Prior to Visit   Medication Sig Dispense Refill    aspirin 81 MG EC tablet Take 1 tablet by mouth Daily.      allopurinol (ZYLOPRIM) 300 MG tablet Take 1 tablet by mouth once daily 30 tablet 0    amLODIPine (NORVASC) 10 MG tablet Take 1 tablet by mouth once daily 30 tablet 0    benazepril (LOTENSIN) 20 MG tablet Take 1 tablet by mouth once daily 90 tablet 1    cyclobenzaprine (FLEXERIL) 10 MG tablet Take 1 tablet by mouth three times daily as needed for muscle spasm 30 tablet 0    glimepiride (AMARYL) 4 MG tablet TAKE 1 TABLET BY MOUTH TWICE DAILY WITH MEALS 60 tablet 0    hydroCHLOROthiazide (MICROZIDE) 12.5 MG capsule Take 1 capsule by mouth once daily in the morning 30 capsule 0    metFORMIN (GLUCOPHAGE) 500 MG tablet TAKE 2 TABLETS BY MOUTH TWICE DAILY WITH MEALS 120 tablet 0    sildenafil (VIAGRA) 100 MG tablet TAKE 1 TABLET BY MOUTH ONCE DAILY AS NEEDED FOR ERECTILE DYSFUNCTION . APPOINTMENT REQUIRED FOR FUTURE REFILLS 30 tablet 0    Cholecalciferol (Vitamin D) 50  "MCG (2000 UT) capsule Take 1 capsule by mouth Daily. (Patient not taking: Reported on 6/14/2024)      dapagliflozin (FARXIGA) 5 MG tablet tablet Take 1 tablet by mouth Daily. (Patient not taking: Reported on 6/14/2024) 90 tablet 0     No facility-administered medications prior to visit.       No opioid medication identified on active medication list. I have reviewed chart for other potential  high risk medication/s and harmful drug interactions in the elderly.        Aspirin is on active medication list. Aspirin use is indicated based on review of current medical condition/s. Pros and cons of this therapy have been discussed today. Benefits of this medication outweigh potential harm.  Patient has been encouraged to continue taking this medication.  .      Patient Active Problem List   Diagnosis    Arthritis    Chronic low back pain    Gout    Hyperlipidemia    Hypertension    Class 2 severe obesity with serious comorbidity and body mass index (BMI) of 38.0 to 38.9 in adult    Uncontrolled type 2 diabetes mellitus with hyperglycemia    Microalbuminuria    Type 2 diabetes mellitus with diabetic nephropathy    Erectile dysfunction    Persistent proteinuria due to type 2 diabetes mellitus    Hypercalcemia    Vitamin D deficiency    Tachycardia    Atrial fibrillation     Advance Care Planning   Advance Care Planning     Advance Directive is not on file.  ACP discussion was held with the patient during this visit. Patient does not have an advance directive, information provided.     Objective    Vitals:    06/14/24 0819   BP: 140/80   BP Location: Left arm   Patient Position: Sitting   Cuff Size: Adult   Pulse: 106   Temp: 96.9 °F (36.1 °C)   SpO2: 98%   Weight: 124 kg (274 lb 6.4 oz)   Height: 182.9 cm (72\")     Estimated body mass index is 37.22 kg/m² as calculated from the following:    Height as of this encounter: 182.9 cm (72\").    Weight as of this encounter: 124 kg (274 lb 6.4 oz).       Does the patient have " evidence of cognitive impairment?  No, as evidenced by mini-cog assessment, see results.          HEALTH RISK ASSESSMENT    Smoking Status:  Social History     Tobacco Use   Smoking Status Former    Current packs/day: 0.00    Average packs/day: 2.5 packs/day for 20.0 years (50.0 ttl pk-yrs)    Types: Cigarettes    Start date: 1975    Quit date: 1995    Years since quittin.4   Smokeless Tobacco Never     Alcohol Consumption:  Social History     Substance and Sexual Activity   Alcohol Use Not Currently    Comment: no ETOH in last 8 years     Fall Risk Screen:    STEADI Fall Risk Assessment was completed, and patient is at LOW risk for falls.Assessment completed on:2024    Depression Screenin/14/2024     8:19 AM   PHQ-2/PHQ-9 Depression Screening   Little Interest or Pleasure in Doing Things 0-->not at all   Feeling Down, Depressed or Hopeless 0-->not at all   PHQ-9: Brief Depression Severity Measure Score 0       Health Habits and Functional and Cognitive Screenin/14/2024     8:00 AM   Functional & Cognitive Status   Do you have difficulty preparing food and eating? No   Do you have difficulty bathing yourself, getting dressed or grooming yourself? No   Do you have difficulty using the toilet? No   Do you have difficulty moving around from place to place? No   Do you have trouble with steps or getting out of a bed or a chair? No   Current Diet Well Balanced Diet   Dental Exam Up to date   Eye Exam Up to date   Exercise (times per week) 0 times per week   Current Exercises Include No Regular Exercise   Do you need help using the phone?  No   Are you deaf or do you have serious difficulty hearing?  No   Do you need help to go to places out of walking distance? No   Do you need help shopping? No   Do you need help preparing meals?  No   Do you need help with housework?  No   Do you need help with laundry? No   Do you need help taking your medications? No   Do you need help managing  money? No   Do you ever drive or ride in a car without wearing a seat belt? No   Have you felt unusual stress, anger or loneliness in the last month? No   Who do you live with? Alone   If you need help, do you have trouble finding someone available to you? No   Have you been bothered in the last four weeks by sexual problems? No   Do you have difficulty concentrating, remembering or making decisions? No       Age-appropriate Screening Schedule:  Refer to the list below for future screening recommendations based on patient's age, sex and/or medical conditions. Orders for these recommended tests are listed in the plan section. The patient has been provided with a written plan.    Health Maintenance   Topic Date Due    COLORECTAL CANCER SCREENING  Never done    TDAP/TD VACCINES (2 - Td or Tdap) 01/02/2023    DIABETIC EYE EXAM  03/16/2023    COVID-19 Vaccine (4 - 2023-24 season) 09/01/2023    HEMOGLOBIN A1C  04/25/2024    RSV Vaccine - Adults (1 - 1-dose 60+ series) 06/14/2025 (Originally 6/9/2020)    Pneumococcal Vaccine 0-64 (2 of 2 - PCV) 06/14/2025 (Originally 2/28/2023)    ZOSTER VACCINE (1 of 2) 06/14/2025 (Originally 6/9/2010)    INFLUENZA VACCINE  08/01/2024    LIPID PANEL  10/25/2024    BMI FOLLOWUP  10/25/2024    URINE MICROALBUMIN  03/14/2025    ANNUAL WELLNESS VISIT  06/14/2025    DIABETIC FOOT EXAM  06/14/2025    HEPATITIS C SCREENING  Completed     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  Will get Tdap at pharmacy.  Will consider Shingrix, COVID-19 booster, and RSV at pharmacy.  Declines PCV20 until 65 years of age.           CMS Preventative Services Quick Reference  Risk Factors Identified During Encounter  Immunizations Discussed/Encouraged: Tdap, Prevnar 20 (Pneumococcal 20-valent conjugate), Shingrix, COVID19, and RSV (Respiratory Syncytial Virus)  The above risks/problems have been discussed with the patient.    Discussed low risk for falls and recommended avoiding throw rugs,  installing grab bars in bathroom, making sure have handrails on steps, etc.    Pertinent information has been shared with the patient in the After Visit Summary.  An After Visit Summary and PPPS were made available to the patient.    Follow Up:   Next Medicare Wellness visit to be scheduled in 1 year.       Additional E&M Note during same encounter follows:  Patient has multiple medical problems which are significant and separately identifiable that require additional work above and beyond the Medicare Wellness Visit.      Chief Complaint  Medicare Wellness-subsequent    Subjective        HPI  Chad Davidson Jr. 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 Haq in [place], [place].     F/U HTN: takes Amlodipine, Benazepril, and HCTZ daily; does not monitor BP; no headaches for most part; some light sensitivity when in bright sun; no orthostasis; no swelling; needs refills today; not very healthy diet, eats pizza, cheesecake, etc regularly.     F/U gout: takes Allopurinol daily; no gout flares for very long time.     F/U microalbuminuria: sees renal Dr. Alvarez; renal stopped Naproxen due to elevated microalbumin; at last follow up recommended SGLT-2 but could not afford; had follow up in March 2024 and to follow up in 1 year.     F/U lower back pain/arthritis: occasional flares of lower back pain; will have discomfort across bilateral lower back during flares; no radiation of pain down legs; no weakness; no bladder or bowel dysfunction; takes Tylenol as needed and helps some;  "Naproxen worked well, but can no longer take due to CKD; takes Flexeril as needed for flares and works well; needs refill of Flexeril.    Has not been taking Vitamin D recently; needs refill.     Needs repeat colonoscopy--needs referral to Dr. Jurado office at [place]; no constipation or diarrhea; no blood in BM.      Review of Systems   Constitutional:  Negative for appetite change, chills, fatigue and fever.   HENT:  Negative for ear pain, sinus pressure, sore throat and trouble swallowing.    Eyes:  Negative for discharge and visual disturbance.   Respiratory:  Negative for cough, chest tightness and shortness of breath.    Cardiovascular:  Negative for chest pain and palpitations.   Gastrointestinal:  Negative for abdominal pain, blood in stool, constipation (some on occasion if eats too much cheese) and diarrhea.   Endocrine: Negative for cold intolerance and heat intolerance. Polydipsia: drinks a lot of water and pot of coffee daily.  Genitourinary:  Negative for dysuria and frequency (gets up 3 times per night to urinate).   Musculoskeletal:  Back pain: see HPI.   Skin:  Negative for rash.   Neurological:  Negative for syncope and weakness.   Hematological:  Does not bruise/bleed easily.   Psychiatric/Behavioral:  Negative for dysphoric mood. The patient is not nervous/anxious.        Objective   Vital Signs:  /80 (BP Location: Left arm, Patient Position: Sitting, Cuff Size: Adult)   Pulse 106   Temp 96.9 °F (36.1 °C)   Ht 182.9 cm (72\")   Wt 124 kg (274 lb 6.4 oz)   SpO2 98%   BMI 37.22 kg/m²     Physical Exam  Vitals and nursing note reviewed.   Constitutional:       General: He is not in acute distress.     Appearance: He is well-developed and well-groomed. He is not diaphoretic.   HENT:      Head: Normocephalic and atraumatic.      Jaw: No tenderness or pain on movement.      Right Ear: Tympanic membrane and external ear normal. No decreased hearing noted.      Left Ear: External ear normal. " Decreased hearing noted. There is impacted cerumen.      Nose: Nose normal.      Right Sinus: No maxillary sinus tenderness or frontal sinus tenderness.      Left Sinus: No maxillary sinus tenderness or frontal sinus tenderness.      Mouth/Throat:      Mouth: Mucous membranes are moist.      Pharynx: No oropharyngeal exudate. Posterior oropharyngeal erythema: mild.  Eyes:      Extraocular Movements: Extraocular movements intact.      Conjunctiva/sclera: Conjunctivae normal.      Pupils: Pupils are equal, round, and reactive to light.   Neck:      Thyroid: No thyromegaly.      Vascular: No carotid bruit.      Trachea: No tracheal deviation.   Cardiovascular:      Rate and Rhythm: Tachycardia present. Rhythm irregularly irregular.      Pulses: Normal pulses.           Dorsalis pedis pulses are 2+ on the right side and 2+ on the left side.        Posterior tibial pulses are 1+ on the right side and 1+ on the left side.      Heart sounds: Normal heart sounds.   Pulmonary:      Effort: Pulmonary effort is normal. No respiratory distress.      Breath sounds: Normal breath sounds.   Abdominal:      General: Bowel sounds are normal.      Palpations: Abdomen is soft. There is no hepatomegaly or splenomegaly.      Tenderness: There is no abdominal tenderness. There is no guarding.   Musculoskeletal:         General: Normal range of motion.      Cervical back: Normal range of motion and neck supple. No bony tenderness.      Thoracic back: No bony tenderness.      Lumbar back: No bony tenderness.      Right lower leg: No edema.      Left lower leg: No edema.        Feet:    Feet:      Right foot:      Protective Sensation: 10 sites tested.  7 sites sensed.      Skin integrity: Skin integrity normal.      Left foot:      Protective Sensation: 10 sites tested.  9 sites sensed.      Skin integrity: Skin integrity normal.      Comments: Diabetic Foot Exam Performed and Monofilament Test Performed    Lymphadenopathy:      Cervical:  No cervical adenopathy.   Skin:     General: Skin is warm and dry.      Findings: No rash.   Neurological:      Mental Status: He is alert and oriented to person, place, and time.      Cranial Nerves: No cranial nerve deficit.      Motor: Motor function is intact.      Coordination: Coordination normal.      Gait: Gait normal.      Deep Tendon Reflexes: Reflexes are normal and symmetric.   Psychiatric:         Mood and Affect: Mood normal.         Behavior: Behavior normal.         Thought Content: Thought content normal.         Cognition and Memory: Cognition normal.         Judgment: Judgment normal.              ECG 12 Lead    Date/Time: 6/14/2024 9:25 AM  Performed by: Eri David APRN    Authorized by: Eri David APRN  Comparison: not compared with previous ECG   Previous ECG: no previous ECG available  Rhythm: atrial fibrillation  Rate: tachycardic  T inversion: II, III and aVF  Other findings: non-specific ST-T wave changes and T wave abnormality         Lab Results   Component Value Date    WBC 9.0 05/03/2023    RBC 5.46 05/03/2023    HGB 17.0 05/03/2023    HCT 48.9 05/03/2023    MCV 90 05/03/2023    MCH 31.1 05/03/2023    MCHC 34.8 05/03/2023    RDW 13.3 05/03/2023    MPV 10.1 06/24/2019     05/03/2023    NEUTRORELPCT 59 05/03/2023    LYMPHORELPCT 27 05/03/2023    MONORELPCT 7 05/03/2023    EOSRELPCT 6 05/03/2023    BASORELPCT 1 05/03/2023    AUTOIGPER 0.2 (H) 06/24/2019    NEUTROABS 5.3 05/03/2023    LYMPHSABS 2.4 05/03/2023    MONOSABS 0.6 05/03/2023    EOSABS 0.5 (H) 05/03/2023    BASOSABS 0.1 05/03/2023    AUTOIGNUM 0.02 06/24/2019    NRBC 0 06/24/2019     Lab Results   Component Value Date    GLUCOSE 238 (H) 10/25/2023    BUN 19 10/25/2023    CREATININE 0.80 10/25/2023    EGFRIFNONA 97 11/09/2021    EGFRIFAFRI >60 01/30/2023    BCR 24 10/25/2023    K 4.8 10/25/2023    CO2 23 10/25/2023    CALCIUM 10.2 10/25/2023    PROTENTOTREF 7.0 10/25/2023    ALBUMIN 4.6 10/25/2023    LABIL2 1.9  10/25/2023    AST 21 10/25/2023    ALT 36 10/25/2023      Lab Results   Component Value Date    CHLPL 189 10/25/2023    TRIG 220 (H) 10/25/2023    HDL 43 10/25/2023    VLDL 38 10/25/2023     (H) 10/25/2023     Lab Results   Component Value Date    HGBA1C 10.4 (H) 10/25/2023     Lab Results   Component Value Date    PSA 0.7 05/03/2023          Assessment and Plan   Diagnoses and all orders for this visit:    1. Encounter for Medicare annual wellness exam (Primary)  -     Comprehensive Metabolic Panel  -     CBC & Differential  -     Lipid Panel With LDL / HDL Ratio  -     Hemoglobin A1c  -     Vitamin B12 & Folate  -     Vitamin D,25-Hydroxy  -     PSA Screen    2. Uncontrolled type 2 diabetes mellitus with hyperglycemia  Assessment & Plan:  Diabetes is  pending lab results .   Continue current treatment regimen.  Regular aerobic exercise.  Reminded to get yearly retinal exam.  Diabetes will be reassessed in 3 months  Continue Metformin and Glimepiride twice daily.  Patient declines trial of GLP-1 or starting insulin, states will do better with watching carbs/sugars in diet.    Orders:  -     metFORMIN (GLUCOPHAGE) 500 MG tablet; Take 2 tablets by mouth 2 (Two) Times a Day With Meals.  Dispense: 360 tablet; Refill: 1  -     glimepiride (AMARYL) 4 MG tablet; Take 1 tablet by mouth 2 (Two) Times a Day With Meals.  Dispense: 180 tablet; Refill: 1  -     Comprehensive Metabolic Panel  -     Lipid Panel With LDL / HDL Ratio  -     Hemoglobin A1c    3. Primary hypertension  Assessment & Plan:  Hypertension is a little increased today.  Continue current treatment regimen.  Medication changes per orders.  Weight loss.  Ambulatory blood pressure monitoring.  Blood pressure will be reassessed in 3 months.  Continue Amlodipine, Benazepril, and HCTZ daily.  Add Metoprolol daily.    Orders:  -     Comprehensive Metabolic Panel  -     CBC & Differential  -     Lipid Panel With LDL / HDL Ratio  -     amLODIPine (NORVASC) 10  MG tablet; Take 1 tablet by mouth Daily.  Dispense: 90 tablet; Refill: 2  -     benazepril (LOTENSIN) 20 MG tablet; Take 1 tablet by mouth Daily.  Dispense: 90 tablet; Refill: 1  -     hydroCHLOROthiazide (MICROZIDE) 12.5 MG capsule; Take 1 capsule by mouth Every Morning.  Dispense: 90 capsule; Refill: 1    4. Mixed hyperlipidemia  Assessment & Plan:  Continue to work on healthy diet and exercise.    Orders:  -     Comprehensive Metabolic Panel  -     Lipid Panel With LDL / HDL Ratio    5. Type 2 diabetes mellitus with diabetic nephropathy, without long-term current use of insulin  -     Vitamin B12 & Folate    6. Vitamin D deficiency  Assessment & Plan:  Start vitamin D 2000 units daily.    Orders:  -     Vitamin D,25-Hydroxy  -     Cholecalciferol (Vitamin D) 50 MCG (2000 UT) capsule; Take 1 capsule by mouth Daily.  Dispense: 90 capsule; Refill: 2    7. Prostate cancer screening  -     PSA Screen    8. Tachycardia  -     Comprehensive Metabolic Panel  -     Magnesium  -     TSH Rfx On Abnormal To Free T4  -     ECG 12 Lead  -     metoprolol succinate XL (Toprol XL) 25 MG 24 hr tablet; Take 1 tablet by mouth Daily.  Dispense: 30 tablet; Refill: 1  -     Ambulatory Referral to Cardiology    9. Gout, unspecified cause, unspecified chronicity, unspecified site  Assessment & Plan:  Stable.  Continue Allopurinol daily.    Orders:  -     allopurinol (ZYLOPRIM) 300 MG tablet; Take 1 tablet by mouth Daily.  Dispense: 90 tablet; Refill: 3    10. Chronic low back pain, unspecified back pain laterality, unspecified whether sciatica present  -     cyclobenzaprine (FLEXERIL) 10 MG tablet; Take 1 tablet by mouth 3 (Three) Times a Day As Needed for Muscle Spasms.  Dispense: 30 tablet; Refill: 0    11. Erectile dysfunction, unspecified erectile dysfunction type  -     sildenafil (VIAGRA) 100 MG tablet; Take 1 tablet by mouth Daily As Needed for Erectile Dysfunction.  Dispense: 30 tablet; Refill: 0    12. Irregular heartbeat  -      ECG 12 Lead    13. Atrial fibrillation, unspecified type  Assessment & Plan:  Start Metoprolol daily.    Orders:  -     metoprolol succinate XL (Toprol XL) 25 MG 24 hr tablet; Take 1 tablet by mouth Daily.  Dispense: 30 tablet; Refill: 1  -     Ambulatory Referral to Cardiology    14. Microalbuminuria  Assessment & Plan:  Improving.  Follow-up as scheduled with renal.      15. Colon cancer screening  -     Ambulatory Referral to Gastroenterology    16. Arthritis  Assessment & Plan:  Continue Tylenol as needed.               Follow Up   Return in about 3 months (around 9/14/2024) for Recheck.or sooner if symptoms persist or worsen.  Discussed atrial fibrillation noted on ECG; discussed risks associated with atrial fibrillation and would recommended blood thinner; pt declines at this time and accepts risks; will refer to cardiology for further evaluation; instructed to go to ER if any chest pain, SOA, etc.; pt verbalized understanding and acknowledged understanding of risks.    [id number] score: 2    Patient was given instructions and counseling regarding his condition or for health maintenance advice. Please see specific information pulled into the AVS if appropriate.

## 2024-06-14 NOTE — ASSESSMENT & PLAN NOTE
Hypertension is a little increased today.  Continue current treatment regimen.  Medication changes per orders.  Weight loss.  Ambulatory blood pressure monitoring.  Blood pressure will be reassessed in 3 months.  Continue Amlodipine, Benazepril, and HCTZ daily.  Add Metoprolol daily.

## 2024-06-14 NOTE — ASSESSMENT & PLAN NOTE
Diabetes is  pending lab results .   Continue current treatment regimen.  Regular aerobic exercise.  Reminded to get yearly retinal exam.  Diabetes will be reassessed in 3 months  Continue Metformin and Glimepiride twice daily.  Patient declines trial of GLP-1 or starting insulin, states will do better with watching carbs/sugars in diet.

## 2024-06-14 NOTE — PATIENT INSTRUCTIONS
Continue to monitor your blood sugar once daily before a meal and record results.  Continue to monitor your blood pressure periodically and record results.  Continue to work on healthy diet and exercise.  Follow up pending lab results.  Follow up in 3 months, or sooner if problems or concerns.   Schedule an eye exam.      Medicare Wellness  Personal Prevention Plan of Service     Date of Office Visit:    Encounter Provider:  AGUSTO Csatillo  Place of Service:  [facility]  Patient Name: Chad Davidson Jr.  :  1960    As part of the Medicare Wellness portion of your visit today, we are providing you with this personalized preventive plan of services (PPPS). This plan is based upon recommendations of the [place] Preventive Services Task Force (USPSTF) and the Advisory Committee on Immunization Practices (ACIP).    This lists the preventive care services that should be considered, and provides dates of when you are due. Items listed as completed are up-to-date and do not require any further intervention.    Health Maintenance   Topic Date Due    COLORECTAL CANCER SCREENING  Never done    TDAP/TD VACCINES (2 - Td or Tdap) 2023    DIABETIC EYE EXAM  2023    COVID-19 Vaccine (4 - -24 season) 2023    DIABETIC FOOT EXAM  2023    HEMOGLOBIN A1C  2024    RSV Vaccine - Adults (1 - 1-dose 60+ series) 2025 (Originally 2020)    Pneumococcal Vaccine 0-64 (2 of 2 - PCV) 2025 (Originally 2023)    ZOSTER VACCINE (1 of 2) 2025 (Originally 2010)    INFLUENZA VACCINE  2024    LIPID PANEL  10/25/2024    BMI FOLLOWUP  10/25/2024    URINE MICROALBUMIN  2025    ANNUAL WELLNESS VISIT  2025    HEPATITIS C SCREENING  Completed       Orders Placed This Encounter   Procedures    Comprehensive Metabolic Panel     Order Specific Question:   Release to patient     Answer:   Routine Release [[id number]]    Lipid Panel  With LDL / HDL Ratio     Order Specific Question:   Release to patient     Answer:   Routine Release [[id number]]    Hemoglobin A1c     Order Specific Question:   Release to patient     Answer:   Routine Release [1400000002]    Vitamin B12 & Folate     Order Specific Question:   Release to patient     Answer:   Routine Release [1400000002]    Vitamin D,25-Hydroxy     Order Specific Question:   Release to patient     Answer:   Routine Release [1400000002]    PSA Screen     Order Specific Question:   Release to patient     Answer:   Routine Release [1400000002]    Magnesium     Order Specific Question:   Release to patient     Answer:   Routine Release [1400000002]    TSH Rfx On Abnormal To Free T4     Order Specific Question:   Release to patient     Answer:   Routine Release [1400000002]    ECG 12 Lead     This order was created via procedure documentation     Order Specific Question:   Release to patient     Answer:   Routine Release [1400000002]    CBC & Differential     Order Specific Question:   Manual Differential     Answer:   No     Order Specific Question:   Release to patient     Answer:   Routine Release [1400000002]       Return in about 3 months (around 9/14/2024) for Recheck.

## 2024-06-15 LAB
25(OH)D3+25(OH)D2 SERPL-MCNC: 31.3 NG/ML (ref 30–100)
ALBUMIN SERPL-MCNC: 4.2 G/DL (ref 3.5–5.2)
ALBUMIN/GLOB SERPL: 1.7 G/DL
ALP SERPL-CCNC: 97 U/L (ref 39–117)
ALT SERPL-CCNC: 29 U/L (ref 1–41)
AST SERPL-CCNC: 18 U/L (ref 1–40)
BASOPHILS # BLD AUTO: 0.06 10*3/MM3 (ref 0–0.2)
BASOPHILS NFR BLD AUTO: 0.8 % (ref 0–1.5)
BILIRUB SERPL-MCNC: 1.1 MG/DL (ref 0–1.2)
BUN SERPL-MCNC: 25 MG/DL (ref 8–23)
BUN/CREAT SERPL: 26.6 (ref 7–25)
CALCIUM SERPL-MCNC: 10 MG/DL (ref 8.6–10.5)
CHLORIDE SERPL-SCNC: 100 MMOL/L (ref 98–107)
CHOLEST SERPL-MCNC: 177 MG/DL (ref 0–200)
CO2 SERPL-SCNC: 21.5 MMOL/L (ref 22–29)
CREAT SERPL-MCNC: 0.94 MG/DL (ref 0.76–1.27)
EGFRCR SERPLBLD CKD-EPI 2021: 90.5 ML/MIN/1.73
EOSINOPHIL # BLD AUTO: 0.36 10*3/MM3 (ref 0–0.4)
EOSINOPHIL NFR BLD AUTO: 5 % (ref 0.3–6.2)
ERYTHROCYTE [DISTWIDTH] IN BLOOD BY AUTOMATED COUNT: 13.3 % (ref 12.3–15.4)
FOLATE SERPL-MCNC: 9.17 NG/ML (ref 4.78–24.2)
GLOBULIN SER CALC-MCNC: 2.5 GM/DL
GLUCOSE SERPL-MCNC: 307 MG/DL (ref 65–99)
HBA1C MFR BLD: 11 % (ref 4.8–5.6)
HCT VFR BLD AUTO: 51.6 % (ref 37.5–51)
HDLC SERPL-MCNC: 37 MG/DL (ref 40–60)
HGB BLD-MCNC: 16.9 G/DL (ref 13–17.7)
IMM GRANULOCYTES # BLD AUTO: 0.02 10*3/MM3 (ref 0–0.05)
IMM GRANULOCYTES NFR BLD AUTO: 0.3 % (ref 0–0.5)
LDLC SERPL CALC-MCNC: 103 MG/DL (ref 0–100)
LDLC/HDLC SERPL: 2.64 {RATIO}
LYMPHOCYTES # BLD AUTO: 1.79 10*3/MM3 (ref 0.7–3.1)
LYMPHOCYTES NFR BLD AUTO: 25 % (ref 19.6–45.3)
MAGNESIUM SERPL-MCNC: 1.8 MG/DL (ref 1.6–2.4)
MCH RBC QN AUTO: 30.2 PG (ref 26.6–33)
MCHC RBC AUTO-ENTMCNC: 32.8 G/DL (ref 31.5–35.7)
MCV RBC AUTO: 92.3 FL (ref 79–97)
MONOCYTES # BLD AUTO: 0.59 10*3/MM3 (ref 0.1–0.9)
MONOCYTES NFR BLD AUTO: 8.2 % (ref 5–12)
NEUTROPHILS # BLD AUTO: 4.34 10*3/MM3 (ref 1.7–7)
NEUTROPHILS NFR BLD AUTO: 60.7 % (ref 42.7–76)
NRBC BLD AUTO-RTO: 0 /100 WBC (ref 0–0.2)
PLATELET # BLD AUTO: 411 10*3/MM3 (ref 140–450)
POTASSIUM SERPL-SCNC: 4.9 MMOL/L (ref 3.5–5.2)
PROT SERPL-MCNC: 6.7 G/DL (ref 6–8.5)
PSA SERPL-MCNC: 0.61 NG/ML (ref 0–4)
RBC # BLD AUTO: 5.59 10*6/MM3 (ref 4.14–5.8)
SODIUM SERPL-SCNC: 134 MMOL/L (ref 136–145)
TRIGL SERPL-MCNC: 211 MG/DL (ref 0–150)
TSH SERPL DL<=0.005 MIU/L-ACNC: 3.09 UIU/ML (ref 0.27–4.2)
VIT B12 SERPL-MCNC: 327 PG/ML (ref 211–946)
VLDLC SERPL CALC-MCNC: 37 MG/DL (ref 5–40)
WBC # BLD AUTO: 7.16 10*3/MM3 (ref 3.4–10.8)

## 2024-06-17 DIAGNOSIS — E11.65 UNCONTROLLED TYPE 2 DIABETES MELLITUS WITH HYPERGLYCEMIA: Primary | ICD-10-CM

## 2024-06-17 DIAGNOSIS — E53.8 VITAMIN B12 DEFICIENCY: ICD-10-CM

## 2024-06-17 RX ORDER — LANOLIN ALCOHOL/MO/W.PET/CERES
1000 CREAM (GRAM) TOPICAL DAILY
Start: 2024-06-17

## 2024-08-05 ENCOUNTER — OFFICE VISIT (OUTPATIENT)
Dept: CARDIOLOGY | Facility: CLINIC | Age: 64
End: 2024-08-05
Payer: MEDICARE

## 2024-08-05 VITALS
HEART RATE: 98 BPM | HEIGHT: 72 IN | OXYGEN SATURATION: 97 % | SYSTOLIC BLOOD PRESSURE: 132 MMHG | WEIGHT: 283.4 LBS | BODY MASS INDEX: 38.39 KG/M2 | DIASTOLIC BLOOD PRESSURE: 82 MMHG

## 2024-08-05 DIAGNOSIS — I10 PRIMARY HYPERTENSION: ICD-10-CM

## 2024-08-05 DIAGNOSIS — I48.19 ATRIAL FIBRILLATION, PERSISTENT: Primary | ICD-10-CM

## 2024-08-05 DIAGNOSIS — R94.31 ABNORMAL EKG: ICD-10-CM

## 2024-08-05 PROCEDURE — 3075F SYST BP GE 130 - 139MM HG: CPT | Performed by: INTERNAL MEDICINE

## 2024-08-05 PROCEDURE — 3079F DIAST BP 80-89 MM HG: CPT | Performed by: INTERNAL MEDICINE

## 2024-08-05 PROCEDURE — 99204 OFFICE O/P NEW MOD 45 MIN: CPT | Performed by: INTERNAL MEDICINE

## 2024-08-05 PROCEDURE — 1160F RVW MEDS BY RX/DR IN RCRD: CPT | Performed by: INTERNAL MEDICINE

## 2024-08-05 PROCEDURE — 1159F MED LIST DOCD IN RCRD: CPT | Performed by: INTERNAL MEDICINE

## 2024-08-05 PROCEDURE — 93000 ELECTROCARDIOGRAM COMPLETE: CPT | Performed by: INTERNAL MEDICINE

## 2024-08-05 RX ORDER — DILTIAZEM HYDROCHLORIDE 120 MG/1
120 CAPSULE, EXTENDED RELEASE ORAL DAILY
Qty: 30 CAPSULE | Refills: 11 | Status: SHIPPED | OUTPATIENT
Start: 2024-08-05

## 2024-08-05 RX ORDER — WARFARIN SODIUM 5 MG/1
5 TABLET ORAL
Qty: 60 TABLET | Refills: 11 | Status: SHIPPED | OUTPATIENT
Start: 2024-08-05

## 2024-08-05 NOTE — PROGRESS NOTES
[facility]      Patient Name: Chad Davidson Jr.  :1960  Age: 64 y.o.  Encounter Provider:  Nagi Chavez Jr, MD      Chief Complaint:   Chief Complaint   Patient presents with    Establish Care     Patient is in the office today to establish care for Tachy and A-fib.          HPI  Chad Davidson Jr. is a 64 y.o. male past medical history of uncontrolled diabetes, hypertension who presents for initial evaluation of atrial fibrillation.  Patient went to his PCP office for routine follow-up and was found to have irregular rhythm confirmed to be atrial fibrillation on the EKG.  He is asymptomatic.  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  He denies angina.  No orthopnea, PND or edema.  No palpitations, dizziness or syncope.  He was started on metoprolol but had some brain fog and short-term memory loss and stopped the medication on his own.  Resting heart rate today in clinic is 98 to 113 bpm.  Blood pressure seems well-controlled.  He is in atrial fibrillation today on EKG.  Family history reviewed and is noncontributory to clinic visit.  He is a former smoker quit , denies alcohol or illicit drug use.      The following portions of the patient's history were reviewed and updated as appropriate: allergies, current medications, past family history, past medical history, past social history, past surgical history and problem list.      Review of Systems   Constitutional: Negative for chills and fever.   HENT:  Negative for hoarse voice and sore throat.    Eyes:  Negative for double vision and photophobia.   Cardiovascular:  Negative for chest pain, leg swelling, near-syncope, orthopnea, palpitations, paroxysmal nocturnal dyspnea and syncope.   Respiratory:  Negative for cough and wheezing.    Skin:  Negative for poor wound healing and rash.   Musculoskeletal:  Negative for arthritis and joint swelling.   Gastrointestinal:  Negative for  "bloating, abdominal pain, hematemesis and hematochezia.   Neurological:  Negative for dizziness and focal weakness.   Psychiatric/Behavioral:  Negative for depression and suicidal ideas.      OBJECTIVE:   Vital Signs  Vitals:    08/05/24 0932   BP: 132/82   Pulse: 98   SpO2: 97%     Estimated body mass index is 38.44 kg/m² as calculated from the following:    Height as of this encounter: 182.9 cm (72\").    Weight as of this encounter: 129 kg (283 lb 6.4 oz).    Vitals reviewed.   Constitutional:       Appearance: Healthy appearance. Not in distress.   Neck:      Vascular: No JVR. JVD normal.   Pulmonary:      Effort: Pulmonary effort is normal.      Breath sounds: Normal breath sounds. No wheezing. No rhonchi. No rales.   Chest:      Chest wall: Not tender to palpatation.   Cardiovascular:      PMI at left midclavicular line. Tachycardia present. Irregularly irregular rhythm. Normal S1. Normal S2.       Murmurs: There is no murmur.      No gallop.  No click. No rub.   Pulses:     Intact distal pulses.   Edema:     Peripheral edema absent.   Abdominal:      General: Bowel sounds are normal.      Palpations: Abdomen is soft.      Tenderness: There is no abdominal tenderness.   Musculoskeletal: Normal range of motion.         General: No tenderness. Skin:     General: Skin is warm and dry.   Neurological:      General: No focal deficit present.      Mental Status: Alert and oriented to person, place and time.       ECG 12 Lead    Date/Time: 8/5/2024 10:04 AM  Performed by: Nagi Chavez Jr., MD    Authorized by: Nagi Chavez Jr., MD  Comparison: compared with previous ECG from 6/14/2024  Similar to previous ECG  Rhythm: atrial fibrillation  Other findings: non-specific ST-T wave changes    Clinical impression: abnormal EKG      Lipid Panel          10/25/2023    12:00 6/14/2024    09:48   Lipid Panel   Total Cholesterol 189  177    Triglycerides 220  211    HDL Cholesterol 43  37    VLDL Cholesterol 38  37    LDL " Cholesterol  108  103    LDL/HDL Ratio 2.5  2.64         BUN   Date Value Ref Range Status   06/14/2024 25 (H) 8 - 23 mg/dL Final   01/30/2023 11 8 - 26 mg/dL Final     Creatinine   Date Value Ref Range Status   06/14/2024 0.94 0.76 - 1.27 mg/dL Final   01/30/2023 0.74 0.73 - 1.18 mg/dL Final     Potassium   Date Value Ref Range Status   06/14/2024 4.9 3.5 - 5.2 mmol/L Final   01/30/2023 4.6 3.5 - 5.1 mmol/L Final     ALT (SGPT)   Date Value Ref Range Status   06/14/2024 29 1 - 41 U/L Final     AST (SGOT)   Date Value Ref Range Status   06/14/2024 18 1 - 40 U/L Final           ASSESSMENT:     64-year-old male with uncontrolled diabetes and primary hypertension presents for initial evaluation of persistent atrial fibrillation      PLAN OF CARE:     Persistent atrial fibrillation -[id number] score of 2.  He does not have any  supplemental insurance and has bumped up against high cost of medications multiple times in the past and is quite fearful of this.  I think it would be reasonable to start warfarin and refer him to the anticoagulation clinic.  Discontinue aspirin at this time.  We will plan on bringing him back in 4 to 6 weeks for cardioversion.  We will check an echocardiogram.  I will not restart beta-blocker given concern for adverse effect of medication but we will try low-dose diltiazem.  I understand that he is already on high-dose amlodipine but since we are starting with a low-dose of diltiazem I will not stop either medication for right now.  Primary hypertension -appears well-controlled at this time  Uncontrolled diabetes -last A1c greater than 11.  Defer to PCP for ongoing workup and management    Return to clinic 3 months             Discharge Medications            Accurate as of August 5, 2024 10:02 AM. If you have any questions, ask your nurse or doctor.                Continue These Medications        Instructions Start Date   allopurinol 300 MG tablet  Commonly known as: ZYLOPRIM   300 mg,  Oral, Daily      amLODIPine 10 MG tablet  Commonly known as: NORVASC   10 mg, Oral, Daily      aspirin 81 MG EC tablet   81 mg, Oral, Daily      benazepril 20 MG tablet  Commonly known as: LOTENSIN   20 mg, Oral, Daily      cyclobenzaprine 10 MG tablet  Commonly known as: FLEXERIL   10 mg, Oral, 3 Times Daily PRN      glimepiride 4 MG tablet  Commonly known as: AMARYL   4 mg, Oral, 2 Times Daily With Meals      hydroCHLOROthiazide 12.5 MG capsule  Commonly known as: MICROZIDE   12.5 mg, Oral, Every Morning      metFORMIN 500 MG tablet  Commonly known as: GLUCOPHAGE   1,000 mg, Oral, 2 Times Daily With Meals      metoprolol succinate XL 25 MG 24 hr tablet  Commonly known as: Toprol XL   25 mg, Oral, Daily      sildenafil 100 MG tablet  Commonly known as: VIAGRA   100 mg, Oral, Daily PRN      SITagliptin 100 MG tablet  Commonly known as: Januvia   100 mg, Oral, Daily      vitamin B-12 1000 MCG tablet  Commonly known as: CYANOCOBALAMIN   1,000 mcg, Oral, Daily      Vitamin D 50 MCG (2000 UT) capsule   2,000 Units, Oral, Daily               Thank you for allowing me to participate in the care of your patient,      Sincerely,   Nagi Chavez Jr, MD  [facility]  08/05/24  10:02 EDT

## 2024-08-06 ENCOUNTER — ANTICOAGULATION VISIT (OUTPATIENT)
Dept: PHARMACY | Facility: HOSPITAL | Age: 64
End: 2024-08-06
Payer: MEDICARE

## 2024-08-06 DIAGNOSIS — I48.19 ATRIAL FIBRILLATION, PERSISTENT: Primary | ICD-10-CM

## 2024-08-06 LAB
INR PPP: 1 (ref 0.91–1.09)
PROTHROMBIN TIME: 11.8 SECONDS (ref 10–13.8)

## 2024-08-06 PROCEDURE — 36416 COLLJ CAPILLARY BLOOD SPEC: CPT

## 2024-08-06 PROCEDURE — 85610 PROTHROMBIN TIME: CPT

## 2024-08-06 PROCEDURE — G0463 HOSPITAL OUTPT CLINIC VISIT: HCPCS

## 2024-08-06 NOTE — PROGRESS NOTES
Anticoagulation Clinic Progress Note  Anticoagulation Summary  As of 2024      INR goal:  2.0-3.0   TTR:  --   INR used for dosin.0 (2024)   Warfarin maintenance plan:  5 mg every day   Weekly warfarin total:  35 mg   Plan last modified:  Perla Fulton, [facility] (2024)   Next INR check:  2024   Target end date:      Indications    Atrial fibrillation  persistent [I48.19]                 Anticoagulation Episode Summary       INR check location:      Preferred lab:      [facility]    Comments:  Dr. Chavez prescribed warfarin 5 mg daily on - Pt lives [address] ([place], [place]). First visit on . Has not started taking warfarin but will be in town for new pt education and baseline INR.            Anticoagulation Care Providers       Provider Role Specialty Phone number    Nagi Chavez Jr., MD Referring Cardiology [phone]            Clinic Interview:      Education:  Chad Davidson Jr. is a new start in the [facility] Management Clinic. We discussed the followin) Warfarin's indication, mechanism, and dosing  2) Enforced the importance of taking warfarin as instructed and at the same time every day, preferably in the evening so that we can make dose adjustments more easily following subsequent clinic visits  3) What he should do about a missed dose; pts can take missed doses within about 12 hours of their usual scheduled dose, but he was instructed on the importance of not doubling up on doses unless told to do so by the [facility]  4) Explained possible side effects of warfarin therapy, including increased risk of bleeding, s/sx of bleeding and s/sx of any additional clots/PE/CVA.   5) Discussed monitoring of warfarin, the INR, goal INR range, and the frequency of monitoring  6) Reviewed drug/food/tobacco/EtOH interactions and provided written information covering these topics in more detail, explaining that green,  leafy vegetables interact most heavily with warfarin  7) Instructed the pt not to take or discontinue any medications without informing his physician/pharmacist and reminded him to inform us of any dietary changes, as well  8) Explained that he would be coming into the clinic more frequently in these first few weeks of therapy as we try to adjust his dose and achieve a therapeutic INR x 2 consecutive readings. Once that is achieved, patient will follow up in clinic every 4 weeks, on average.    He stated no problems with transportation or scheduling clinic appts in this manner. he expressed understanding of the information provided and has no additional questions at this time.    Wolfangel VIVIANE Davidson Jr. was presented with a copy of the Patients Rights and Responsibilities. he expressed verbal consent and agreement to receive care in the [facility] under the current collaborative care agreement with [facility].       INR History:      8/6/2024     9:30 AM   Anticoagulation Monitoring   INR 1.0   INR Date 8/6/2024   INR Goal 2.0-3.0   Last Week Total 0 mg   Next Week Total 30 mg   Sun 5 mg   Mon -   Tue Hold (8/6)   Wed 5 mg   Thu 5 mg   Fri 5 mg   Sat 5 mg       Plan:  1. INR is  1.0  today- see above in Anticoagulation Summary.   New start to warfarin. 64yr type 2 diabetic, HTN. Diagnosed w/ a fib. Cattle/. Will start pt with 5mg daily. Educated pt on warfarin, diet, bleed sx. Gave him education sheets and diet sheet. 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   Pt prefers to come here to clinic for now although has a ways to drive here. Reji on Mon 8/12.  Pt uses 7 day pillbox. Pt understood info that we went over. - see above in Anticoagulation Summary.  2. Follow up Mon, 8/12  3. Patient declines warfarin refills.  4. Verbal and written information provided. Patient expresses understanding and has no further questions at this time.    Perla Fulton,  RPH

## 2024-08-09 ENCOUNTER — TRANSCRIBE ORDERS (OUTPATIENT)
Dept: CARDIOLOGY | Facility: CLINIC | Age: 64
End: 2024-08-09
Payer: MEDICARE

## 2024-08-09 DIAGNOSIS — I48.0 PAROXYSMAL ATRIAL FIBRILLATION: ICD-10-CM

## 2024-08-09 DIAGNOSIS — Z13.6 SCREENING FOR ISCHEMIC HEART DISEASE: Primary | ICD-10-CM

## 2024-08-12 ENCOUNTER — ANTICOAGULATION VISIT (OUTPATIENT)
Dept: PHARMACY | Facility: HOSPITAL | Age: 64
End: 2024-08-12
Payer: MEDICARE

## 2024-08-12 DIAGNOSIS — I48.19 ATRIAL FIBRILLATION, PERSISTENT: Primary | ICD-10-CM

## 2024-08-12 LAB
INR PPP: 1.3 (ref 0.91–1.09)
PROTHROMBIN TIME: 16.1 SECONDS (ref 10–13.8)

## 2024-08-12 PROCEDURE — 85610 PROTHROMBIN TIME: CPT

## 2024-08-12 PROCEDURE — 36416 COLLJ CAPILLARY BLOOD SPEC: CPT

## 2024-08-12 PROCEDURE — G0463 HOSPITAL OUTPT CLINIC VISIT: HCPCS

## 2024-08-12 NOTE — PROGRESS NOTES
Anticoagulation Clinic Progress Note    Anticoagulation Summary  As of 2024      INR goal:  2.0-3.0   TTR:  --   INR used for dosin.3 (2024)   Warfarin maintenance plan:  7.5 mg every Mon, Wed; 5 mg all other days   Weekly warfarin total:  40 mg   Plan last modified:  Ashleigh Cordon RPH (2024)   Next INR check:  2024   Target end date:      Indications    Atrial fibrillation  persistent [I48.19]                 Anticoagulation Episode Summary       INR check location:      Preferred lab:      Send IN[facility] reminders [facility]:   [facility]    Comments:  Dr. Chavez prescribed warfarin 5 mg daily on - Pt lives [address] ([place], [place]). First visit on . Has not started taking warfarin but will be in town for new pt education and baseline INR.            Anticoagulation Care Providers       Provider Role Specialty Phone number    Nagi Chavez Jr., MD Referring Cardiology [phone]            Clinic Interview:  Patient Findings     Negatives:  Signs/symptoms of thrombosis, Signs/symptoms of bleeding,   Laboratory test error suspected, Change in health, Change in alcohol use,   Change in activity, Upcoming invasive procedure, Emergency department   visit, Upcoming dental procedure, Missed doses, Extra doses, Change in   medications, Change in diet/appetite, Hospital admission, Bruising, Other   complaints      Clinical Outcomes     Negatives:  Major bleeding event, Thromboembolic event,   Anticoagulation-related hospital admission, Anticoagulation-related ED   visit, Anticoagulation-related fatality        INR History:      2024     9:30 AM 2024     8:30 AM   Anticoagulation Monitoring   INR 1.0 1.3   INR Date 2024   INR Goal 2.0-3.0 2.0-3.0   Trend  Up   Last Week Total 0 mg 25 mg   Next Week Total 30 mg 40 mg   Sun 5 mg 5 mg   Mon - 7.5 mg   Tue Hold () 5 mg   Wed 5 mg 7.5 mg   Thu 5 mg 5 mg   Fri 5 mg 5 mg   Sat 5 mg 5 mg       Plan:  1. INR is  Subtherapeutic today- see above in Anticoagulation Summary.  Will instruct Chad Davidson Jr. to change their warfarin regimen to 7.5 mg on Mondays and Wednesdays, and 5 mg all other days- see above in Anticoagulation Summary.  2. Follow up in 1 week  3. Patient declines warfarin refills.  4. Verbal and written information provided. Patient expresses understanding and has no further questions at this time.    Ashleigh Cordon RPH

## 2024-08-13 NOTE — PROGRESS NOTES
I have supervised and reviewed the notes, assessments, and/or procedures performed. The documented assessment and plan were developed cooperatively. I concur with the documentation of this patient encounter. Mr. Davidson was unable to return sooner than 1 week, unfortunately.     Lauri Burdick, PharmD

## 2024-08-16 ENCOUNTER — ANTICOAGULATION VISIT (OUTPATIENT)
Dept: PHARMACY | Facility: HOSPITAL | Age: 64
End: 2024-08-16
Payer: MEDICARE

## 2024-08-16 DIAGNOSIS — I48.19 ATRIAL FIBRILLATION, PERSISTENT: Primary | ICD-10-CM

## 2024-08-16 LAB
INR PPP: 2 (ref 0.91–1.09)
PROTHROMBIN TIME: 23.6 SECONDS (ref 10–13.8)

## 2024-08-16 PROCEDURE — 36416 COLLJ CAPILLARY BLOOD SPEC: CPT

## 2024-08-16 PROCEDURE — G0463 HOSPITAL OUTPT CLINIC VISIT: HCPCS

## 2024-08-16 PROCEDURE — 85610 PROTHROMBIN TIME: CPT

## 2024-08-16 NOTE — PROGRESS NOTES
Anticoagulation Clinic Progress Note    Anticoagulation Summary  As of 2024      INR goal:  2.0-3.0   TTR:  0.0% (1 d)   INR used for dosin.0 (2024)   Warfarin maintenance plan:  7.5 mg every Mon, Wed; 5 mg all other days   Weekly warfarin total:  40 mg   No change documented:  Kristy Hartmann, Pharmacy Technician   Plan last modified:  Ashleigh Cordon RPH (2024)   Next INR check:  2024   Target end date:      Indications    Atrial fibrillation  persistent [I48.19]                 Anticoagulation Episode Summary       INR check location:      Preferred lab:      [facility]:   [facility] [place]    Comments:  Dr. Chavez prescribed warfarin 5 mg daily on - Pt lives [address] ([place], [place]). First visit on . Has not started taking warfarin but will be in town for new pt education and baseline INR.            Anticoagulation Care Providers       Provider Role Specialty Phone number    Nagi Chavez Jr., MD Referring Cardiology [phone]            Clinic Interview:  Patient Findings     Negatives:  Signs/symptoms of thrombosis, Signs/symptoms of bleeding,   Laboratory test error suspected, Change in health, Change in alcohol use,   Change in activity, Upcoming invasive procedure, Emergency department   visit, Upcoming dental procedure, Missed doses, Extra doses, Change in   medications, Change in diet/appetite, Hospital admission, Bruising, Other   complaints      Clinical Outcomes     Negatives:  Major bleeding event, Thromboembolic event,   Anticoagulation-related hospital admission, Anticoagulation-related ED   visit, Anticoagulation-related fatality        INR History:      2024     9:30 AM 2024     8:30 AM 2024     9:45 AM   Anticoagulation Monitoring   INR 1.0 1.3 2.0   INR Date 2024   INR Goal 2.0-3.0 2.0-3.0 2.0-3.0   Trend  Up Same   Last Week Total 0 mg 25 mg 40 mg   Next Week Total 30 mg 40 mg 40 mg   Sun 5 mg 5  mg 5 mg   Mon - 7.5 mg 7.5 mg   Tue Hold (8/6) 5 mg 5 mg   Wed 5 mg 7.5 mg 7.5 mg   Thu 5 mg 5 mg 5 mg   Fri 5 mg 5 mg 5 mg   Sat 5 mg 5 mg 5 mg       Plan:  1. INR is therapeutic today- see above in Anticoagulation Summary.   Will instruct Chad Davidson Jr. to continue their warfarin regimen- see above in Anticoagulation Summary.  2. Follow up in 1 week.  3. Patient declines warfarin refills.  4. Verbal and written information provided. Patient expresses understanding and has no further questions at this time.    Kristy Hartmann, Pharmacy Technician

## 2024-08-16 NOTE — PROGRESS NOTES
I have supervised and reviewed the notes, assessments, and/or procedures performed. I personally performed the assessment and implemented the plan. I concur with the documentation of this patient encounter.    Lauri Burdick, PharmD

## 2024-08-19 ENCOUNTER — APPOINTMENT (OUTPATIENT)
Dept: PHARMACY | Facility: HOSPITAL | Age: 64
End: 2024-08-19
Payer: MEDICARE

## 2024-08-23 ENCOUNTER — ANTICOAGULATION VISIT (OUTPATIENT)
Dept: PHARMACY | Facility: HOSPITAL | Age: 64
End: 2024-08-23
Payer: MEDICARE

## 2024-08-23 DIAGNOSIS — I48.19 ATRIAL FIBRILLATION, PERSISTENT: Primary | ICD-10-CM

## 2024-08-23 LAB
INR PPP: 1.8 (ref 0.91–1.09)
PROTHROMBIN TIME: 21.3 SECONDS (ref 10–13.8)

## 2024-08-23 PROCEDURE — 85610 PROTHROMBIN TIME: CPT

## 2024-08-23 PROCEDURE — G0463 HOSPITAL OUTPT CLINIC VISIT: HCPCS

## 2024-08-23 PROCEDURE — 36416 COLLJ CAPILLARY BLOOD SPEC: CPT

## 2024-08-23 NOTE — PROGRESS NOTES
I have supervised and reviewed the notes, assessments, and/or procedures performed. I personally performed the assessment and implemented the plan. I concur with the documentation of this patient encounter.    Tamara Harris, [facility]

## 2024-08-23 NOTE — PROGRESS NOTES
Anticoagulation Clinic Progress Note    Anticoagulation Summary  As of 2024      INR goal:  2.0-3.0   TTR:  0.0% (1.1 wk)   INR used for dosin.8 (2024)   Warfarin maintenance plan:  7.5 mg every Mon, Wed, Fri; 5 mg all other days   Weekly warfarin total:  42.5 mg   Plan last modified:  Tamara Harris RPH (2024)   Next INR check:  2024   Target end date:      Indications    Atrial fibrillation  persistent [I48.19]                 Anticoagulation Episode Summary       INR check location:      Preferred lab:      Send INR reminders to:   [facility] [place]    Comments:  Dr. Chavez prescribed warfarin 5 mg daily on - Pt lives [address] ([place], [place]). First visit on . Has not started taking warfarin but will be in town for new pt education and baseline INR.            Anticoagulation Care Providers       Provider Role Specialty Phone number    Nagi Chavez Jr., MD Referring Cardiology [phone]            Clinic Interview:  Patient Findings     Negatives:  Signs/symptoms of thrombosis, Signs/symptoms of bleeding,   Laboratory test error suspected, Change in health, Change in alcohol use,   Change in activity, Upcoming invasive procedure, Emergency department   visit, Upcoming dental procedure, Missed doses, Extra doses, Change in   medications, Change in diet/appetite, Hospital admission, Bruising, Other   complaints      Clinical Outcomes     Negatives:  Major bleeding event, Thromboembolic event,   Anticoagulation-related hospital admission, Anticoagulation-related ED   visit, Anticoagulation-related fatality        INR History:      2024     9:30 AM 2024     8:30 AM 2024     9:45 AM 2024    10:30 AM   Anticoagulation Monitoring   INR 1.0 1.3 2.0 1.8   INR Date 2024   INR Goal 2.0-3.0 2.0-3.0 2.0-3.0 2.0-3.0   Trend  Up Same Up   Last Week Total 0 mg 25 mg 40 mg 40 mg   Next Week Total 30 mg 40 mg 40 mg 42.5 mg    Sun 5 mg 5 mg 5 mg 5 mg   Mon - 7.5 mg 7.5 mg 7.5 mg   Tue Hold (8/6) 5 mg 5 mg 5 mg   Wed 5 mg 7.5 mg 7.5 mg 7.5 mg   Thu 5 mg 5 mg 5 mg 5 mg   Fri 5 mg 5 mg 5 mg 7.5 mg   Sat 5 mg 5 mg 5 mg 5 mg       Plan:  1. INR is Subtherapeutic today- see above in Anticoagulation Summary.   Will instruct Chad Davidson Jr. to Change their warfarin regimen- see above in Anticoagulation Summary.  Change regimen to 7.5 mg on Monday/Wednesday/Friday and 5 mg all other days   2. Follow up in 2 weeks  3. They have been instructed to call if any changes in medications, doses, concerns, etc. Patient expresses understanding and has no further questions at this time.    Louise Glass, Pharmacy Intern

## 2024-09-06 ENCOUNTER — ANTICOAGULATION VISIT (OUTPATIENT)
Dept: PHARMACY | Facility: HOSPITAL | Age: 64
End: 2024-09-06
Payer: MEDICARE

## 2024-09-06 ENCOUNTER — HOSPITAL ENCOUNTER (OUTPATIENT)
Dept: CARDIOLOGY | Facility: HOSPITAL | Age: 64
Discharge: HOME | End: 2024-09-06
Payer: MEDICARE

## 2024-09-06 ENCOUNTER — TELEPHONE (OUTPATIENT)
Dept: CARDIOLOGY | Facility: CLINIC | Age: 64
End: 2024-09-06

## 2024-09-06 VITALS
DIASTOLIC BLOOD PRESSURE: 80 MMHG | BODY MASS INDEX: 38.52 KG/M2 | WEIGHT: 284.39 LBS | SYSTOLIC BLOOD PRESSURE: 136 MMHG | HEART RATE: 101 BPM | HEIGHT: 72 IN

## 2024-09-06 DIAGNOSIS — I48.19 ATRIAL FIBRILLATION, PERSISTENT: Primary | ICD-10-CM

## 2024-09-06 DIAGNOSIS — R94.31 ABNORMAL EKG: ICD-10-CM

## 2024-09-06 LAB
AORTIC DIMENSIONLESS INDEX: 0.7 (DI)
ASCENDING AORTA: 3 CM
BH CV ECHO MEAS - ACS: 1.68 CM
BH CV ECHO MEAS - AO MAX PG: 8.1 MMHG
BH CV ECHO MEAS - AO MEAN PG: 3.8 MMHG
BH CV ECHO MEAS - AO ROOT DIAM: 3.3 CM
BH CV ECHO MEAS - AO V2 MAX: 142.1 CM/SEC
BH CV ECHO MEAS - AO V2 VTI: 22.9 CM
BH CV ECHO MEAS - AVA(I,D): 2.6 CM2
BH CV ECHO MEAS - EDV(CUBED): 83.6 ML
BH CV ECHO MEAS - EDV(MOD-SP2): 144 ML
BH CV ECHO MEAS - EDV(MOD-SP4): 153 ML
BH CV ECHO MEAS - EF(MOD-BP): 60.3 %
BH CV ECHO MEAS - EF(MOD-SP2): 57.6 %
BH CV ECHO MEAS - EF(MOD-SP4): 60.8 %
BH CV ECHO MEAS - ESV(CUBED): 28.1 ML
BH CV ECHO MEAS - ESV(MOD-SP2): 61 ML
BH CV ECHO MEAS - ESV(MOD-SP4): 60 ML
BH CV ECHO MEAS - FS: 30.4 %
BH CV ECHO MEAS - IVS/LVPW: 1.02 CM
BH CV ECHO MEAS - IVSD: 1.62 CM
BH CV ECHO MEAS - LAT PEAK E' VEL: 17.3 CM/SEC
BH CV ECHO MEAS - LV DIASTOLIC VOL/BSA (35-75): 62.1 CM2
BH CV ECHO MEAS - LV MASS(C)D: 295.2 GRAMS
BH CV ECHO MEAS - LV MAX PG: 3 MMHG
BH CV ECHO MEAS - LV MEAN PG: 1.49 MMHG
BH CV ECHO MEAS - LV SYSTOLIC VOL/BSA (12-30): 24.3 CM2
BH CV ECHO MEAS - LV V1 MAX: 87 CM/SEC
BH CV ECHO MEAS - LV V1 VTI: 15.8 CM
BH CV ECHO MEAS - LVIDD: 4.4 CM
BH CV ECHO MEAS - LVIDS: 3 CM
BH CV ECHO MEAS - LVOT AREA: 3.8 CM2
BH CV ECHO MEAS - LVOT DIAM: 2.2 CM
BH CV ECHO MEAS - LVPWD: 1.6 CM
BH CV ECHO MEAS - MED PEAK E' VEL: 14.4 CM/SEC
BH CV ECHO MEAS - MV DEC SLOPE: 716.3 CM/SEC2
BH CV ECHO MEAS - MV DEC TIME: 0.17 SEC
BH CV ECHO MEAS - MV E MAX VEL: 76.5 CM/SEC
BH CV ECHO MEAS - MV MAX PG: 5.7 MMHG
BH CV ECHO MEAS - MV MEAN PG: 1.94 MMHG
BH CV ECHO MEAS - MV P1/2T: 49.7 MSEC
BH CV ECHO MEAS - MV V2 VTI: 14.7 CM
BH CV ECHO MEAS - MVA(P1/2T): 4.4 CM2
BH CV ECHO MEAS - MVA(VTI): 4.1 CM2
BH CV ECHO MEAS - PA ACC TIME: 0.08 SEC
BH CV ECHO MEAS - PA V2 MAX: 79.1 CM/SEC
BH CV ECHO MEAS - PULM DIAS VEL: 28.6 CM/SEC
BH CV ECHO MEAS - PULM S/D: 0.88
BH CV ECHO MEAS - PULM SYS VEL: 25.1 CM/SEC
BH CV ECHO MEAS - QP/QS: 0.62
BH CV ECHO MEAS - RV MAX PG: 2.12 MMHG
BH CV ECHO MEAS - RV V1 MAX: 72.8 CM/SEC
BH CV ECHO MEAS - RV V1 VTI: 10.4 CM
BH CV ECHO MEAS - RVOT DIAM: 2.14 CM
BH CV ECHO MEAS - SV(LVOT): 60.5 ML
BH CV ECHO MEAS - SV(MOD-SP2): 83 ML
BH CV ECHO MEAS - SV(MOD-SP4): 93 ML
BH CV ECHO MEAS - SV(RVOT): 37.3 ML
BH CV ECHO MEAS - SVI(LVOT): 24.5 ML/M2
BH CV ECHO MEAS - SVI(MOD-SP2): 33.7 ML/M2
BH CV ECHO MEAS - SVI(MOD-SP4): 37.7 ML/M2
BH CV ECHO MEASUREMENTS AVERAGE E/E' RATIO: 4.83
BH CV XLRA - RV BASE: 3.8 CM
BH CV XLRA - RV LENGTH: 7.8 CM
BH CV XLRA - RV MID: 4.6 CM
BH CV XLRA - TDI S': 13.5 CM/SEC
INR PPP: 1.5 (ref 0.91–1.09)
LEFT ATRIUM VOLUME INDEX: 21.2 ML/M2
PROTHROMBIN TIME: 18.6 SECONDS (ref 10–13.8)
SINUS: 3.6 CM
STJ: 3.5 CM

## 2024-09-06 PROCEDURE — 85610 PROTHROMBIN TIME: CPT

## 2024-09-06 PROCEDURE — 36416 COLLJ CAPILLARY BLOOD SPEC: CPT

## 2024-09-06 PROCEDURE — 25510000001 PERFLUTREN 6.52 MG/ML SUSPENSION 2 ML VIAL: Performed by: INTERNAL MEDICINE

## 2024-09-06 PROCEDURE — G0463 HOSPITAL OUTPT CLINIC VISIT: HCPCS

## 2024-09-06 PROCEDURE — 93306 TTE W/DOPPLER COMPLETE: CPT

## 2024-09-06 RX ORDER — WARFARIN SODIUM 5 MG/1
TABLET ORAL
Qty: 130 TABLET | Refills: 1 | Status: SHIPPED | OUTPATIENT
Start: 2024-09-06

## 2024-09-06 RX ADMIN — PERFLUTREN 2 ML: 6.52 INJECTION, SUSPENSION INTRAVENOUS at 11:11

## 2024-09-06 NOTE — TELEPHONE ENCOUNTER
Left message discussing normal echo results other than calcified aortic valve without stenosis or regurgitation.  It looks like they have had trouble getting him into the therapeutic zone with warfarin and we can hold off for another 4 weeks before a JAKE cardioversion.  I will get these things ordered and plan for procedures in the first week of October.

## 2024-09-06 NOTE — PROGRESS NOTES
I have supervised and reviewed the notes, assessments, and/or procedures performed. The documented assessment and plan were developed cooperatively, and the plan was implemented in my presence. I concur with the documentation of this patient encounter.    Lauri Burdick, PharmD

## 2024-09-06 NOTE — PROGRESS NOTES
Anticoagulation Clinic Progress Note    Anticoagulation Summary  As of 2024      INR goal:  2.0-3.0   TTR:  0.0% (3.1 wk)   INR used for dosin.5 (2024)   Warfarin maintenance plan:  10 mg every Mon, Wed, Fri; 5 mg all other days   Weekly warfarin total:  50 mg   Plan last modified:  Louise Glass, Pharmacy Intern (2024)   Next INR check:  2024   Target end date:      Indications    Atrial fibrillation  persistent [I48.19]                 Anticoagulation Episode Summary       INR check location:      Preferred lab:      [facility]:   [facility] [place]    Comments:  Dr. Chavez prescribed warfarin 5 mg daily on - Pt lives [address] ([place], [place]). First visit on . Has not started taking warfarin but will be in town for new pt education and baseline INR.            Anticoagulation Care Providers       Provider Role Specialty Phone number    Nagi Chavez Jr., MD Referring Cardiology [phone]            Clinic Interview:  Patient Findings     Positives:  Change in diet/appetite    Negatives:  Signs/symptoms of thrombosis, Signs/symptoms of bleeding,   Laboratory test error suspected, Change in health, Change in alcohol use,   Change in activity, Upcoming invasive procedure, Emergency department   visit, Upcoming dental procedure, Missed doses, Extra doses, Change in   medications, Hospital admission, Bruising, Other complaints    Comments:  Patient reported he has been limiting greens from his diet   because he knows they can affect his INR but would like to work them back   in a couple days per week. Patient did have some potato salad and dixie   slaw recently.      Clinical Outcomes     Negatives:  Major bleeding event, Thromboembolic event,   Anticoagulation-related hospital admission, Anticoagulation-related ED   visit, Anticoagulation-related fatality    Comments:  Patient reported he has been limiting greens from his diet   because he knows they can affect  his INR but would like to work them back   in a couple days per week. Patient did have some potato salad and dixie   slaw recently.        INR History:      8/6/2024     9:30 AM 8/12/2024     8:30 AM 8/16/2024     9:45 AM 8/23/2024    10:30 AM 9/6/2024     9:45 AM   Anticoagulation Monitoring   INR 1.0 1.3 2.0 1.8 1.5   INR Date 8/6/2024 8/12/2024 8/16/2024 8/23/2024 9/6/2024   INR Goal 2.0-3.0 2.0-3.0 2.0-3.0 2.0-3.0 2.0-3.0   Trend  Up Same Up Up   Last Week Total 0 mg 25 mg 40 mg 40 mg 42.5 mg   Next Week Total 30 mg 40 mg 40 mg 42.5 mg 50 mg   Sun 5 mg 5 mg 5 mg 5 mg 5 mg   Mon - 7.5 mg 7.5 mg 7.5 mg 10 mg   Tue Hold (8/6) 5 mg 5 mg 5 mg 5 mg   Wed 5 mg 7.5 mg 7.5 mg 7.5 mg 10 mg   Thu 5 mg 5 mg 5 mg 5 mg -   Fri 5 mg 5 mg 5 mg 7.5 mg 10 mg   Sat 5 mg 5 mg 5 mg 5 mg 5 mg       Plan:  1. INR is Subtherapeutic today- see above in Anticoagulation Summary.   Will instruct Chad Davidson Jr. to Change their warfarin regimen- see above in Anticoagulation Summary.  Increase dose to 10 mg on Mondays/Wednesdays/Fridays and 5 mg all other days  2. Follow up in 1 week  3. They have been instructed to call if any changes in medications, doses, concerns, etc. Patient expresses understanding and has no further questions at this time.    Louise Glass, Pharmacy Intern

## 2024-09-12 ENCOUNTER — ANTICOAGULATION VISIT (OUTPATIENT)
Dept: PHARMACY | Facility: HOSPITAL | Age: 64
End: 2024-09-12
Payer: MEDICARE

## 2024-09-12 DIAGNOSIS — I48.19 ATRIAL FIBRILLATION, PERSISTENT: Primary | ICD-10-CM

## 2024-09-12 LAB
INR PPP: 1.6 (ref 0.91–1.09)
PROTHROMBIN TIME: 18.8 SECONDS (ref 10–13.8)

## 2024-09-12 PROCEDURE — G0463 HOSPITAL OUTPT CLINIC VISIT: HCPCS

## 2024-09-12 PROCEDURE — 36416 COLLJ CAPILLARY BLOOD SPEC: CPT

## 2024-09-12 PROCEDURE — 85610 PROTHROMBIN TIME: CPT

## 2024-09-12 NOTE — PROGRESS NOTES
Subjective   Chad Davidson Jr. is a 64 y.o. male.     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    F/U HTN: takes Amlodipine, Benazepril, Diltiazem and HCTZ daily; does not monitor BP, has 6 home BP machines, but do not seem accurate; no headaches; had recent episode after cutting hay all day and had not eaten after doing a lot of walking; got back home and noted vision change; symptoms resolved with sitting down and eating; no swelling.    F/U atrial fibrillation: started Metoprolol daily and had brain fog/short term memory loss, so stopped taking; saw cardiology and started on Warfarin due to cost; INR managed per cardiology warfarin clinic; INR goal about 2.5; last INR 1.6; currently taking Warfarin 10 mg daily except 5 mg on Tuesdays and Saturdays; no signs of bleeding; no blood in BM; no epistaxis; also started Diltiazem low dose and to continue Amlodipine; plan for cardioversion in 4-6 weeks, but having trouble getting INR therapeutic; plan for JAKE before cardioversion in early October at this point; had Echo on 9/6/24 and LV systolic function was normal; EF 56-60%; LV diastolic function was indeterminate; moderate calcification of aortic valve, no regurg or stenosis; atrial fibrillation predominant rhythm.    F/U gout: takes Allopurinol daily; no gout flares since has been on medication.    F/U microalbuminuria: sees renal Dr. Alvarez; last follow up 3/2024, sees annually.    F/U lower back  pain/arthritis: occasional flares of lower back pain; will have spasms with discomfort across lower back during flares; no radiation of pain down legs since had discectomy in past; has chronic weakness in right leg since tear in patellar tendon, not able to climb any more; wears support sleeve on right knee and helps; no bladder or bowel dysfunction; takes Tylenol as needed during flares and helps some; cannot take Naproxen due to CKD and Warfarin; also takes Flexeril rarely as needed for flares and helps.    F/U Vitamin D deficiency: never started Vitamin D; previously taking daily MVI, but had elevated calcium.    Needs to schedule repeat colonoscopy.      The following portions of the patient's history were reviewed and updated as appropriate: allergies, current medications, past family history, past medical history, past social history, past surgical history and problem list.    Current Outpatient Medications on File Prior to Visit   Medication Sig    allopurinol (ZYLOPRIM) 300 MG tablet Take 1 tablet by mouth Daily.    amLODIPine (NORVASC) 10 MG tablet Take 1 tablet by mouth Daily.    benazepril (LOTENSIN) 20 MG tablet Take 1 tablet by mouth Daily.    cyclobenzaprine (FLEXERIL) 10 MG tablet Take 1 tablet by mouth 3 (Three) Times a Day As Needed for Muscle Spasms.    dilTIAZem XR (DILACOR XR) 120 MG 24 hr capsule Take 1 capsule by mouth Daily.    glimepiride (AMARYL) 4 MG tablet Take 1 tablet by mouth 2 (Two) Times a Day With Meals.    hydroCHLOROthiazide (MICROZIDE) 12.5 MG capsule Take 1 capsule by mouth Every Morning.    metFORMIN (GLUCOPHAGE) 500 MG tablet Take 2 tablets by mouth 2 (Two) Times a Day With Meals.    sildenafil (VIAGRA) 100 MG tablet Take 1 tablet by mouth Daily As Needed for Erectile Dysfunction.    vitamin B-12 (CYANOCOBALAMIN) 1000 MCG tablet Take 1 tablet by mouth Daily.    warfarin (COUMADIN) 5 MG tablet Take two tablets (10 mg) by mouth Mon, Wed, and Fri, and take one tablets (5 mg) by  mouth all other days or as directed.    Cholecalciferol (Vitamin D) 50 MCG (2000 UT) capsule Take 1 capsule by mouth Daily. (Patient not taking: Reported on 2024)    [DISCONTINUED] dilTIAZem (TIAZAC) 120 MG 24 hr capsule Take 1 capsule by mouth Daily. (Patient not taking: Reported on 2024)    [DISCONTINUED] metoprolol succinate XL (Toprol XL) 25 MG 24 hr tablet Take 1 tablet by mouth Daily. (Patient not taking: Reported on 2024)    [DISCONTINUED] SITagliptin (Januvia) 100 MG tablet Take 1 tablet by mouth Daily.     No current facility-administered medications on file prior to visit.        Past Medical History:   Diagnosis Date    Acquired dysplasia of oral cavity 2023    Arthritis     Brown recluse spider bite     with necrotizing fasciitis    COVID-19 virus infection 2021    Diabetes mellitus     Erectile dysfunction     Hypertension     Low back pain     Parotitis 2019       Past Surgical History:   Procedure Laterality Date    BACK SURGERY      COLONOSCOPY  approx     2 polyps removed    JOINT REPLACEMENT      REPAIR PATELLAR TENDON Right 2016    REPLACEMENT TOTAL KNEE Bilateral        Family History   Problem Relation Age of Onset    Diabetes Father     Alcohol abuse Father     Hepatitis Father     Diabetes Sister     Hypertension Sister        Social History     Socioeconomic History    Marital status:    Tobacco Use    Smoking status: Former     Current packs/day: 0.00     Average packs/day: 2.5 packs/day for 20.0 years (50.0 ttl pk-yrs)     Types: Cigarettes     Start date: 1975     Quit date: 1995     Years since quittin.7     Passive exposure: Past    Smokeless tobacco: Never   Vaping Use    Vaping status: Never Used   Substance and Sexual Activity    Alcohol use: Not Currently     Comment: no ETOH in last 8 years    Drug use: Never    Sexual activity: Yes     Partners: Female       Review of Systems   Constitutional:  Negative for appetite change,  "chills, fatigue, fever, unexpected weight gain and unexpected weight loss.   HENT:  Negative for ear pain, sinus pressure, sore throat and trouble swallowing.    Eyes:  Negative for blurred vision.   Respiratory:  Negative for cough, chest tightness and shortness of breath.    Cardiovascular:  Negative for chest pain and palpitations.   Gastrointestinal:  Negative for abdominal pain, constipation, diarrhea, GERD and indigestion.   Endocrine: Negative for polydipsia (drinks a lot of water, always has).   Genitourinary:  Negative for dysuria and frequency.   Musculoskeletal:  Back pain: see HPI.   Skin:  Negative for rash.   Neurological:  Negative for syncope (see HPI).       Objective   Vitals:    09/13/24 0758   BP: 110/70   BP Location: Left arm   Patient Position: Sitting   Cuff Size: Large Adult   Pulse: 102   Temp: 97.7 °F (36.5 °C)   SpO2: 97%   Weight: 126 kg (277 lb 2 oz)   Height: 182 cm (71.65\")   PainSc: 0-No pain     Body mass index is 37.95 kg/m².    Physical Exam  Vitals and nursing note reviewed.   Constitutional:       General: He is not in acute distress.     Appearance: He is well-developed and well-groomed. He is not diaphoretic.   HENT:      Head: Normocephalic.      Right Ear: External ear normal.      Left Ear: External ear normal.   Eyes:      Conjunctiva/sclera: Conjunctivae normal.   Neck:      Vascular: No carotid bruit.   Cardiovascular:      Rate and Rhythm: Normal rate. Rhythm regularly irregular.      Pulses: Normal pulses.      Heart sounds: Normal heart sounds.   Pulmonary:      Effort: Pulmonary effort is normal. No respiratory distress.      Breath sounds: Normal breath sounds.   Abdominal:      General: Bowel sounds are normal.      Palpations: Abdomen is soft. There is no hepatomegaly or splenomegaly.      Tenderness: There is no abdominal tenderness. There is no guarding.      Hernia: Hernia is present in the umbilical area (reducible; pt declines referral at this time; ER " warnings given).   Musculoskeletal:      Cervical back: Normal range of motion and neck supple.      Right lower leg: No edema.      Left lower leg: No edema.   Skin:     General: Skin is warm and dry.      Findings: No rash.   Neurological:      Mental Status: He is alert and oriented to person, place, and time.      Gait: Gait normal.   Psychiatric:         Mood and Affect: Mood normal.         Behavior: Behavior normal.         Thought Content: Thought content normal.         Cognition and Memory: Cognition normal.         Judgment: Judgment normal.         Lab Results   Component Value Date    WBC 7.16 06/14/2024    RBC 5.59 06/14/2024    HGB 16.9 06/14/2024    HCT 51.6 (H) 06/14/2024    MCV 92.3 06/14/2024    MCH 30.2 06/14/2024    MCHC 32.8 06/14/2024    RDW 13.3 06/14/2024    MPV 10.1 06/24/2019     06/14/2024    NEUTRORELPCT 60.7 06/14/2024    LYMPHORELPCT 25.0 06/14/2024    MONORELPCT 8.2 06/14/2024    EOSRELPCT 5.0 06/14/2024    BASORELPCT 0.8 06/14/2024    AUTOIGPER 0.2 (H) 06/24/2019    NEUTROABS 4.34 06/14/2024    LYMPHSABS 1.79 06/14/2024    MONOSABS 0.59 06/14/2024    EOSABS 0.36 06/14/2024    BASOSABS 0.06 06/14/2024    AUTOIGNUM 0.02 06/24/2019    NRBC 0.0 06/14/2024     Lab Results   Component Value Date    GLUCOSE 307 (H) 06/14/2024    BUN 25 (H) 06/14/2024    CREATININE 0.94 06/14/2024    EGFRIFNONA 97 11/09/2021    EGFRIFAFRI >60 01/30/2023    BCR 26.6 (H) 06/14/2024    K 4.9 06/14/2024    CO2 21.5 (L) 06/14/2024    CALCIUM 10.0 06/14/2024    PROTENTOTREF 6.7 06/14/2024    ALBUMIN 4.2 06/14/2024    LABIL2 1.7 06/14/2024    AST 18 06/14/2024    ALT 29 06/14/2024      Lab Results   Component Value Date    CHLPL 177 06/14/2024    TRIG 211 (H) 06/14/2024    HDL 37 (L) 06/14/2024    VLDL 37 06/14/2024     (H) 06/14/2024     Lab Results   Component Value Date    TSH 3.090 06/14/2024     Lab Results   Component Value Date    HGBA1C 11.00 (H) 06/14/2024     Lab Results   Component Value  Date    PSA 0.608 06/14/2024     Lab Results   Component Value Date    WBCU 0-2 10/23/2015    RBCUA None Seen 10/23/2015    BACTERIA None Seen 10/23/2015    LABPH 5.0 10/23/2015    COLORU Yellow 07/14/2015    CLARITYU Clear 10/23/2015    LEUKOCYTESUR Negative 10/23/2015    GLUCOSEU 3+ (A) 10/23/2015    BLOODU Negative 10/23/2015    BILIRUBINUR Negative 10/23/2015    NITRITEU Negative 10/23/2015          Assessment    Problem List Items Addressed This Visit       Chronic low back pain    Current Assessment & Plan     Continue Tylenol as needed.         Gout    Current Assessment & Plan     Stable.  Continue Allopurinol daily.         Hyperlipidemia    Current Assessment & Plan     Declines statin.  Continue to work on healthy diet and exercise.         Relevant Orders    Comprehensive Metabolic Panel    Hypertension    Current Assessment & Plan     Hypertension is stable and controlled  Continue current treatment regimen.  Regular aerobic exercise.  Ambulatory blood pressure monitoring.  Blood pressure will be reassessed in 3 months.  Continue Amlodipine, Benazepril, Diltiazem, and HCTZ daily.  Follow up as scheduled with cardiology.         Relevant Orders    CBC & Differential    Comprehensive Metabolic Panel    Uncontrolled type 2 diabetes mellitus with hyperglycemia - Primary    Current Assessment & Plan     Diabetes is  pending lab results .   Continue current treatment regimen.  Regular aerobic exercise.  Reminded to get yearly retinal exam.  Diabetes will be reassessed in 3 months  Continue Metformin and Glimepiride twice daily.         Relevant Orders    CBC & Differential    Comprehensive Metabolic Panel    Hemoglobin A1c    Persistent proteinuria due to type 2 diabetes mellitus    Current Assessment & Plan     Follow up as scheduled with renal.         Atrial fibrillation, persistent    Current Assessment & Plan     Continue Warfarin daily.  Continue Diltiazem daily.  Follow up as scheduled with cardiology  and warfarin clinic.         Refusal of statin medication by patient        Return in about 3 months (around 12/13/2024) for Recheck.or sooner if problems or concerns.  Patient declines insulin for diabetes despite risks with uncontrolled diabetes; pending A1c.         Answers submitted by the patient for this visit:  Other (Submitted on 9/13/2024)  Please describe your symptoms.: Medicare check-up  Have you had these symptoms before?: Yes  How long have you been having these symptoms?: Greater than 2 weeks  Primary Reason for Visit (Submitted on 9/13/2024)  What is the primary reason for your visit?: Problem Not Listed

## 2024-09-12 NOTE — PROGRESS NOTES
Anticoagulation Clinic Progress Note    Anticoagulation Summary  As of 2024      INR goal:  2.0-3.0   TTR:  0.0% (4 wk)   INR used for dosin.6 (2024)   Warfarin maintenance plan:  5 mg every Tue, Sat; 10 mg all other days   Weekly warfarin total:  60 mg   Plan last modified:  Tamara Harris RPH (2024)   Next INR check:  2024   Target end date:      Indications    Atrial fibrillation  persistent [I48.19]                 Anticoagulation Episode Summary       INR check location:      Preferred lab:      Send INR reminders to:  [facility]    Comments:  Lives [address] ([place], [place]).           Anticoagulation Care Providers       Provider Role Specialty Phone number    Nagi Chavez Jr., MD Referring Cardiology [phone]            Clinic Interview:  Patient Findings     Positives:  Change in diet/appetite    Negatives:  Signs/symptoms of thrombosis, Signs/symptoms of bleeding,   Laboratory test error suspected, Change in health, Change in alcohol use,   Change in activity, Upcoming invasive procedure, Emergency department   visit, Upcoming dental procedure, Missed doses, Extra doses, Change in   medications, Hospital admission, Bruising, Other complaints    Comments:  Patient reported he has been incorporating salads back into   his diet weekly.      Clinical Outcomes     Negatives:  Major bleeding event, Thromboembolic event,   Anticoagulation-related hospital admission, Anticoagulation-related ED   visit, Anticoagulation-related fatality    Comments:  Patient reported he has been incorporating salads back into   his diet weekly.        INR History:      2024     9:30 AM 2024     8:30 AM 2024     9:45 AM 2024    10:30 AM 2024     9:45 AM 2024     8:45 AM   Anticoagulation Monitoring   INR 1.0 1.3 2.0 1.8 1.5 1.6   INR Date 2024   INR Goal 2.0-3.0 2.0-3.0 2.0-3.0 2.0-3.0 2.0-3.0 2.0-3.0    Trend  Up Same Up Up Up   Last Week Total 0 mg 25 mg 40 mg 40 mg 42.5 mg 50 mg   Next Week Total 30 mg 40 mg 40 mg 42.5 mg 50 mg 60 mg   Sun 5 mg 5 mg 5 mg 5 mg 5 mg 10 mg   Mon - 7.5 mg 7.5 mg 7.5 mg 10 mg 10 mg   Tue Hold (8/6) 5 mg 5 mg 5 mg 5 mg 5 mg   Wed 5 mg 7.5 mg 7.5 mg 7.5 mg 10 mg 10 mg   Thu 5 mg 5 mg 5 mg 5 mg - 10 mg   Fri 5 mg 5 mg 5 mg 7.5 mg 10 mg 10 mg   Sat 5 mg 5 mg 5 mg 5 mg 5 mg 5 mg       Plan:  1. INR is Subtherapeutic today- see above in Anticoagulation Summary.   Will instruct Chad Davidson Jr. to Change their warfarin regimen- see above in Anticoagulation Summary.  Increase dosing regimen to 5 mg on Tuesdays and Saturdays and 10 mg all other days  2. Follow up in 1 week  3. They have been instructed to call if any changes in medications, doses, concerns, etc. Patient expresses understanding and has no further questions at this time.    Louise Glass, Pharmacy Intern

## 2024-09-12 NOTE — PROGRESS NOTES
I have supervised and reviewed the notes, assessments, and/or procedures performed. The documented assessment and plan were developed cooperatively, and the plan was implemented in my presence. I concur with the documentation of this patient encounter.    Tamara Harris, [facility]

## 2024-09-13 ENCOUNTER — OFFICE VISIT (OUTPATIENT)
Dept: FAMILY MEDICINE CLINIC | Facility: CLINIC | Age: 64
End: 2024-09-13
Payer: MEDICARE

## 2024-09-13 VITALS
BODY MASS INDEX: 37.53 KG/M2 | SYSTOLIC BLOOD PRESSURE: 110 MMHG | DIASTOLIC BLOOD PRESSURE: 70 MMHG | WEIGHT: 277.13 LBS | HEIGHT: 72 IN | HEART RATE: 102 BPM | OXYGEN SATURATION: 97 % | TEMPERATURE: 97.7 F

## 2024-09-13 DIAGNOSIS — I10 PRIMARY HYPERTENSION: ICD-10-CM

## 2024-09-13 DIAGNOSIS — I48.19 ATRIAL FIBRILLATION, PERSISTENT: ICD-10-CM

## 2024-09-13 DIAGNOSIS — Z53.20 REFUSAL OF STATIN MEDICATION BY PATIENT: ICD-10-CM

## 2024-09-13 DIAGNOSIS — E11.65 UNCONTROLLED TYPE 2 DIABETES MELLITUS WITH HYPERGLYCEMIA: Primary | ICD-10-CM

## 2024-09-13 DIAGNOSIS — R80.9 PERSISTENT PROTEINURIA DUE TO TYPE 2 DIABETES MELLITUS: ICD-10-CM

## 2024-09-13 DIAGNOSIS — M54.50 CHRONIC LOW BACK PAIN, UNSPECIFIED BACK PAIN LATERALITY, UNSPECIFIED WHETHER SCIATICA PRESENT: ICD-10-CM

## 2024-09-13 DIAGNOSIS — M10.9 GOUT, UNSPECIFIED CAUSE, UNSPECIFIED CHRONICITY, UNSPECIFIED SITE: ICD-10-CM

## 2024-09-13 DIAGNOSIS — E11.29 PERSISTENT PROTEINURIA DUE TO TYPE 2 DIABETES MELLITUS: ICD-10-CM

## 2024-09-13 DIAGNOSIS — G89.29 CHRONIC LOW BACK PAIN, UNSPECIFIED BACK PAIN LATERALITY, UNSPECIFIED WHETHER SCIATICA PRESENT: ICD-10-CM

## 2024-09-13 DIAGNOSIS — E78.2 MIXED HYPERLIPIDEMIA: ICD-10-CM

## 2024-09-13 PROCEDURE — 1159F MED LIST DOCD IN RCRD: CPT | Performed by: NURSE PRACTITIONER

## 2024-09-13 PROCEDURE — 3046F HEMOGLOBIN A1C LEVEL >9.0%: CPT | Performed by: NURSE PRACTITIONER

## 2024-09-13 PROCEDURE — 3074F SYST BP LT 130 MM HG: CPT | Performed by: NURSE PRACTITIONER

## 2024-09-13 PROCEDURE — 3078F DIAST BP <80 MM HG: CPT | Performed by: NURSE PRACTITIONER

## 2024-09-13 PROCEDURE — 99214 OFFICE O/P EST MOD 30 MIN: CPT | Performed by: NURSE PRACTITIONER

## 2024-09-13 PROCEDURE — 1126F AMNT PAIN NOTED NONE PRSNT: CPT | Performed by: NURSE PRACTITIONER

## 2024-09-13 PROCEDURE — 1160F RVW MEDS BY RX/DR IN RCRD: CPT | Performed by: NURSE PRACTITIONER

## 2024-09-13 RX ORDER — DILTIAZEM HYDROCHLORIDE 120 MG/1
1 CAPSULE, EXTENDED RELEASE ORAL DAILY
COMMUNITY
Start: 2024-08-05 | End: 2024-09-13 | Stop reason: SDUPTHER

## 2024-09-13 NOTE — ASSESSMENT & PLAN NOTE
Diabetes is  pending lab results .   Continue current treatment regimen.  Regular aerobic exercise.  Reminded to get yearly retinal exam.  Diabetes will be reassessed in 3 months  Continue Metformin and Glimepiride twice daily.

## 2024-09-13 NOTE — ASSESSMENT & PLAN NOTE
Continue Warfarin daily.  Continue Diltiazem daily.  Follow up as scheduled with cardiology and warfarin clinic.

## 2024-09-13 NOTE — ASSESSMENT & PLAN NOTE
Hypertension is stable and controlled  Continue current treatment regimen.  Regular aerobic exercise.  Ambulatory blood pressure monitoring.  Blood pressure will be reassessed in 3 months.  Continue Amlodipine, Benazepril, Diltiazem, and HCTZ daily.  Follow up as scheduled with cardiology.

## 2024-09-14 LAB
ALBUMIN SERPL-MCNC: 4.2 G/DL (ref 3.5–5.2)
ALBUMIN/GLOB SERPL: 1.7 G/DL
ALP SERPL-CCNC: 86 U/L (ref 39–117)
ALT SERPL-CCNC: 36 U/L (ref 1–41)
AST SERPL-CCNC: 21 U/L (ref 1–40)
BASOPHILS # BLD AUTO: 0.08 10*3/MM3 (ref 0–0.2)
BASOPHILS NFR BLD AUTO: 1.2 % (ref 0–1.5)
BILIRUB SERPL-MCNC: 0.8 MG/DL (ref 0–1.2)
BUN SERPL-MCNC: 16 MG/DL (ref 8–23)
BUN/CREAT SERPL: 16.3 (ref 7–25)
CALCIUM SERPL-MCNC: 9.7 MG/DL (ref 8.6–10.5)
CHLORIDE SERPL-SCNC: 102 MMOL/L (ref 98–107)
CO2 SERPL-SCNC: 22.3 MMOL/L (ref 22–29)
CREAT SERPL-MCNC: 0.98 MG/DL (ref 0.76–1.27)
EGFRCR SERPLBLD CKD-EPI 2021: 86.1 ML/MIN/1.73
EOSINOPHIL # BLD AUTO: 0.35 10*3/MM3 (ref 0–0.4)
EOSINOPHIL NFR BLD AUTO: 5.1 % (ref 0.3–6.2)
ERYTHROCYTE [DISTWIDTH] IN BLOOD BY AUTOMATED COUNT: 13.3 % (ref 12.3–15.4)
GLOBULIN SER CALC-MCNC: 2.5 GM/DL
GLUCOSE SERPL-MCNC: 242 MG/DL (ref 65–99)
HBA1C MFR BLD: 11.1 % (ref 4.8–5.6)
HCT VFR BLD AUTO: 47.8 % (ref 37.5–51)
HGB BLD-MCNC: 16.3 G/DL (ref 13–17.7)
IMM GRANULOCYTES # BLD AUTO: 0.04 10*3/MM3 (ref 0–0.05)
IMM GRANULOCYTES NFR BLD AUTO: 0.6 % (ref 0–0.5)
LYMPHOCYTES # BLD AUTO: 1.8 10*3/MM3 (ref 0.7–3.1)
LYMPHOCYTES NFR BLD AUTO: 26.2 % (ref 19.6–45.3)
MCH RBC QN AUTO: 31.2 PG (ref 26.6–33)
MCHC RBC AUTO-ENTMCNC: 34.1 G/DL (ref 31.5–35.7)
MCV RBC AUTO: 91.6 FL (ref 79–97)
MONOCYTES # BLD AUTO: 0.57 10*3/MM3 (ref 0.1–0.9)
MONOCYTES NFR BLD AUTO: 8.3 % (ref 5–12)
NEUTROPHILS # BLD AUTO: 4.03 10*3/MM3 (ref 1.7–7)
NEUTROPHILS NFR BLD AUTO: 58.6 % (ref 42.7–76)
NRBC BLD AUTO-RTO: 0 /100 WBC (ref 0–0.2)
PLATELET # BLD AUTO: 428 10*3/MM3 (ref 140–450)
POTASSIUM SERPL-SCNC: 5.4 MMOL/L (ref 3.5–5.2)
PROT SERPL-MCNC: 6.7 G/DL (ref 6–8.5)
RBC # BLD AUTO: 5.22 10*6/MM3 (ref 4.14–5.8)
SODIUM SERPL-SCNC: 136 MMOL/L (ref 136–145)
WBC # BLD AUTO: 6.87 10*3/MM3 (ref 3.4–10.8)

## 2024-09-16 DIAGNOSIS — E11.65 UNCONTROLLED TYPE 2 DIABETES MELLITUS WITH HYPERGLYCEMIA: Primary | ICD-10-CM

## 2024-09-20 ENCOUNTER — ANTICOAGULATION VISIT (OUTPATIENT)
Dept: PHARMACY | Facility: HOSPITAL | Age: 64
End: 2024-09-20
Payer: MEDICARE

## 2024-09-20 DIAGNOSIS — I48.19 ATRIAL FIBRILLATION, PERSISTENT: Primary | ICD-10-CM

## 2024-09-20 LAB
INR PPP: 2.3 (ref 0.91–1.09)
PROTHROMBIN TIME: 27.5 SECONDS (ref 10–13.8)

## 2024-09-20 PROCEDURE — 85610 PROTHROMBIN TIME: CPT

## 2024-09-20 PROCEDURE — 36416 COLLJ CAPILLARY BLOOD SPEC: CPT

## 2024-09-20 PROCEDURE — G0463 HOSPITAL OUTPT CLINIC VISIT: HCPCS

## 2024-09-27 ENCOUNTER — APPOINTMENT (OUTPATIENT)
Dept: PHARMACY | Facility: HOSPITAL | Age: 64
End: 2024-09-27
Payer: MEDICARE

## 2024-09-30 ENCOUNTER — ANTICOAGULATION VISIT (OUTPATIENT)
Dept: PHARMACY | Facility: HOSPITAL | Age: 64
End: 2024-09-30
Payer: MEDICARE

## 2024-09-30 DIAGNOSIS — I48.19 ATRIAL FIBRILLATION, PERSISTENT: Primary | ICD-10-CM

## 2024-09-30 LAB
INR PPP: 3.4 (ref 0.91–1.09)
PROTHROMBIN TIME: 40.4 SECONDS (ref 10–13.8)

## 2024-09-30 PROCEDURE — 36416 COLLJ CAPILLARY BLOOD SPEC: CPT

## 2024-09-30 PROCEDURE — G0463 HOSPITAL OUTPT CLINIC VISIT: HCPCS

## 2024-09-30 PROCEDURE — 85610 PROTHROMBIN TIME: CPT

## 2024-09-30 NOTE — PROGRESS NOTES
Anticoagulation Clinic Progress Note    Anticoagulation Summary  As of 9/30/2024      INR goal:  2.0-3.0   TTR:  21.2% (1.5 mo)   INR used for dosing:  3.4 (9/30/2024)   Warfarin maintenance plan:  5 mg every Tue, Sat; 10 mg all other days   Weekly warfarin total:  60 mg   Plan last modified:  Tamara Harris RPH (9/12/2024)   Next INR check:  10/7/2024   Target end date:      Indications    Atrial fibrillation  persistent [I48.19]                 Anticoagulation Episode Summary       INR check location:      Preferred lab:      Send INR reminders to:  [facility] CLINICAL POOL    Comments:  Lives [address] ([place], [place]).           Anticoagulation Care Providers       Provider Role Specialty Phone number    Nagi Chavez Jr., MD Referring Cardiology [phone]            Clinic Interview:  Patient Findings     Positives:  Change in diet/appetite    Negatives:  Signs/symptoms of thrombosis, Signs/symptoms of bleeding,   Laboratory test error suspected, Change in health, Change in alcohol use,   Change in activity, Upcoming invasive procedure, Emergency department   visit, Upcoming dental procedure, Missed doses, Extra doses, Change in   medications, Hospital admission, Bruising, Other complaints      Clinical Outcomes     Negatives:  Major bleeding event, Thromboembolic event,   Anticoagulation-related hospital admission, Anticoagulation-related ED   visit, Anticoagulation-related fatality        INR History:      8/12/2024     8:30 AM 8/16/2024     9:45 AM 8/23/2024    10:30 AM 9/6/2024     9:45 AM 9/12/2024     8:45 AM 9/20/2024     9:15 AM 9/30/2024     9:45 AM   Anticoagulation Monitoring   INR 1.3 2.0 1.8 1.5 1.6 2.3 3.4   INR Date 8/12/2024 8/16/2024 8/23/2024 9/6/2024 9/12/2024 9/20/2024 9/30/2024   INR Goal 2.0-3.0 2.0-3.0 2.0-3.0 2.0-3.0 2.0-3.0 2.0-3.0 2.0-3.0   Trend Up Same Up Up Up Same Same   Last Week Total 25 mg 40 mg 40 mg 42.5 mg 50 mg 60 mg 60 mg   Next Week Total 40 mg 40 mg 42.5 mg  50 mg 60 mg 60 mg 55 mg   Sun 5 mg 5 mg 5 mg 5 mg 10 mg 10 mg 10 mg   Mon 7.5 mg 7.5 mg 7.5 mg 10 mg 10 mg 10 mg 5 mg (9/30)   Tue 5 mg 5 mg 5 mg 5 mg 5 mg 5 mg 5 mg   Wed 7.5 mg 7.5 mg 7.5 mg 10 mg 10 mg 10 mg 10 mg   Thu 5 mg 5 mg 5 mg - 10 mg 10 mg 10 mg   Fri 5 mg 5 mg 7.5 mg 10 mg 10 mg 10 mg 10 mg   Sat 5 mg 5 mg 5 mg 5 mg 5 mg 5 mg 5 mg       Plan:  1. INR is Supratherapeutic today- see above in Anticoagulation Summary.  Will instruct Chad Davidson Jr. to Change their warfarin regimen- see above in Anticoagulation Summary.   Stopped eating salads and vegetables for 1 week. Plans to resume salads every other day. Partial to 5 mg then resume, rck 1 week (weekly INRs for cardioversion to be scheduled)   2. Follow up in 1 week  3. Patient declines warfarin refills.  4. Verbal and written information provided. Patient expresses understanding and has no further questions at this time.    Tamara Harris, [facility]

## 2024-10-07 ENCOUNTER — ANTICOAGULATION VISIT (OUTPATIENT)
Dept: PHARMACY | Facility: HOSPITAL | Age: 64
End: 2024-10-07
Payer: MEDICARE

## 2024-10-07 DIAGNOSIS — I48.19 ATRIAL FIBRILLATION, PERSISTENT: Primary | ICD-10-CM

## 2024-10-07 LAB
INR PPP: 2.7 (ref 0.91–1.09)
PROTHROMBIN TIME: 32.8 SECONDS (ref 10–13.8)

## 2024-10-07 PROCEDURE — 36416 COLLJ CAPILLARY BLOOD SPEC: CPT

## 2024-10-07 PROCEDURE — 85610 PROTHROMBIN TIME: CPT

## 2024-10-07 PROCEDURE — G0463 HOSPITAL OUTPT CLINIC VISIT: HCPCS

## 2024-10-07 NOTE — PROGRESS NOTES
Anticoagulation Clinic Progress Note    Anticoagulation Summary  As of 10/7/2024      INR goal:  2.0-3.0   TTR:  24.0% (1.8 mo)   INR used for dosin.7 (10/7/2024)   Warfarin maintenance plan:  5 mg every Tue, Sat; 10 mg all other days   Weekly warfarin total:  60 mg   No change documented:  Lauri Burdick, PharmD   Plan last modified:  Tamara Harris RPH (2024)   Next INR check:  10/14/2024   Target end date:      Indications    Atrial fibrillation  persistent [I48.19]                 Anticoagulation Episode Summary       INR check location:      Preferred lab:      Send INR reminders to:   [facility]    Comments:  [address]ives [address] ([place], [place]).           Anticoagulation Care Providers       Provider Role Specialty Phone number    Nagi Chavez Jr., MD Referring Cardiology [phone]            Clinic Interview:  Patient Findings     Positives:  Other complaints    Negatives:  Signs/symptoms of thrombosis, Signs/symptoms of bleeding,   Laboratory test error suspected, Change in health, Change in alcohol use,   Change in activity, Upcoming invasive procedure, Emergency department   visit, Upcoming dental procedure, Missed doses, Extra doses, Change in   medications, Change in diet/appetite, Hospital admission, Bruising    Comments:  Cardioversion pending scheduling. He resumed his usual   vegetable intake.       Clinical Outcomes     Negatives:  Major bleeding event, Thromboembolic event,   Anticoagulation-related hospital admission, Anticoagulation-related ED   visit, Anticoagulation-related fatality    Comments:  Cardioversion pending scheduling. He resumed his usual   vegetable intake.         INR History:      2024     9:45 AM 2024    10:30 AM 2024     9:45 AM 2024     8:45 AM 2024     9:15 AM 2024     9:45 AM 10/7/2024    10:00 AM   Anticoagulation Monitoring   INR 2.0 1.8 1.5 1.6 2.3 3.4 2.7   INR Date 2024  9/20/2024 9/30/2024 10/7/2024   INR Goal 2.0-3.0 2.0-3.0 2.0-3.0 2.0-3.0 2.0-3.0 2.0-3.0 2.0-3.0   Trend Same Up Up Up Same Same Same   Last Week Total 40 mg 40 mg 42.5 mg 50 mg 60 mg 60 mg 55 mg   Next Week Total 40 mg 42.5 mg 50 mg 60 mg 60 mg 55 mg 60 mg   Sun 5 mg 5 mg 5 mg 10 mg 10 mg 10 mg 10 mg   Mon 7.5 mg 7.5 mg 10 mg 10 mg 10 mg 5 mg (9/30) 10 mg   Tue 5 mg 5 mg 5 mg 5 mg 5 mg 5 mg 5 mg   Wed 7.5 mg 7.5 mg 10 mg 10 mg 10 mg 10 mg 10 mg   Thu 5 mg 5 mg - 10 mg 10 mg 10 mg 10 mg   Fri 5 mg 7.5 mg 10 mg 10 mg 10 mg 10 mg 10 mg   Sat 5 mg 5 mg 5 mg 5 mg 5 mg 5 mg 5 mg       Plan:  1. INR is Therapeutic today- see above in Anticoagulation Summary.  Will instruct Chad Davidson Jr. to Continue their warfarin regimen- see above in Anticoagulation Summary.  2. Follow up in 1 week  3. Patient declines warfarin refills.  4. Verbal and written information provided. Patient expresses understanding and has no further questions at this time.    Lauri Burdick, PharmD

## 2024-10-14 ENCOUNTER — ANTICOAGULATION VISIT (OUTPATIENT)
Dept: PHARMACY | Facility: HOSPITAL | Age: 64
End: 2024-10-14
Payer: MEDICARE

## 2024-10-14 DIAGNOSIS — I48.19 ATRIAL FIBRILLATION, PERSISTENT: Primary | ICD-10-CM

## 2024-10-14 LAB
INR PPP: 3.2 (ref 0.91–1.09)
PROTHROMBIN TIME: 38.6 SECONDS (ref 10–13.8)

## 2024-10-14 PROCEDURE — 36416 COLLJ CAPILLARY BLOOD SPEC: CPT

## 2024-10-14 PROCEDURE — G0463 HOSPITAL OUTPT CLINIC VISIT: HCPCS

## 2024-10-14 PROCEDURE — 85610 PROTHROMBIN TIME: CPT

## 2024-10-14 NOTE — PROGRESS NOTES
Anticoagulation Clinic Progress Note    Anticoagulation Summary  As of 10/14/2024      INR goal:  2.0-3.0   TTR:  28.2% (2 mo)   INR used for dosing:  3.2 (10/14/2024)   Warfarin maintenance plan:  5 mg every Tue, Thu, Sat; 10 mg all other days   Weekly warfarin total:  55 mg   Plan last modified:  Tamara Harris RPH (10/14/2024)   Next INR check:  10/21/2024   Target end date:  --    Indications    Atrial fibrillation  persistent [I48.19]                 Anticoagulation Episode Summary       INR check location:  --    Preferred lab:  --    Send INR reminders to:   [facility] [place]    Comments:  [address]ives [address] ([place], [place]).           Anticoagulation Care Providers       Provider Role Specialty Phone number    Nagi Chavez Jr., MD Referring Cardiology [phone]            Clinic Interview:  Patient Findings     Negatives:  Signs/symptoms of thrombosis, Signs/symptoms of bleeding,   Laboratory test error suspected, Change in health, Change in alcohol use,   Change in activity, Upcoming invasive procedure, Emergency department   visit, Upcoming dental procedure, Missed doses, Extra doses, Change in   medications, Change in diet/appetite, Hospital admission, Bruising, Other   complaints      Clinical Outcomes     Negatives:  Major bleeding event, Thromboembolic event,   Anticoagulation-related hospital admission, Anticoagulation-related ED   visit, Anticoagulation-related fatality        INR History:      8/23/2024    10:30 AM 9/6/2024     9:45 AM 9/12/2024     8:45 AM 9/20/2024     9:15 AM 9/30/2024     9:45 AM 10/7/2024    10:00 AM 10/14/2024     9:45 AM   Anticoagulation Monitoring   INR 1.8 1.5 1.6 2.3 3.4 2.7 3.2   INR Date 8/23/2024 9/6/2024 9/12/2024 9/20/2024 9/30/2024 10/7/2024 10/14/2024   INR Goal 2.0-3.0 2.0-3.0 2.0-3.0 2.0-3.0 2.0-3.0 2.0-3.0 2.0-3.0   Trend Up Up Up Same Same Same Down   Last Week Total 40 mg 42.5 mg 50 mg 60 mg 60 mg 55 mg 60 mg   Next Week Total 42.5 mg 50 mg 60  mg 60 mg 55 mg 60 mg 55 mg   Sun 5 mg 5 mg 10 mg 10 mg 10 mg 10 mg 10 mg   Mon 7.5 mg 10 mg 10 mg 10 mg 5 mg (9/30) 10 mg 10 mg   Tue 5 mg 5 mg 5 mg 5 mg 5 mg 5 mg 5 mg   Wed 7.5 mg 10 mg 10 mg 10 mg 10 mg 10 mg 10 mg   Thu 5 mg - 10 mg 10 mg 10 mg 10 mg 5 mg   Fri 7.5 mg 10 mg 10 mg 10 mg 10 mg 10 mg 10 mg   Sat 5 mg 5 mg 5 mg 5 mg 5 mg 5 mg 5 mg       Plan:  1. INR is Supratherapeutic today- see above in Anticoagulation Summary.  Will instruct Chad Davidson Jr. to Change their warfarin regimen- see above in Anticoagulation Summary.  decrease to 5 mg on tues, thurs, sat and 10 mg AOD, rck 1 week   2. Follow up in 1 week  3. Patient declines warfarin refills.  4. Verbal and written information provided. Patient expresses understanding and has no further questions at this time.    Tamara Harris [facility]

## 2024-10-16 ENCOUNTER — TELEPHONE (OUTPATIENT)
Dept: CARDIOLOGY | Facility: CLINIC | Age: 64
End: 2024-10-16
Payer: MEDICARE

## 2024-10-16 NOTE — TELEPHONE ENCOUNTER
Pt calling the office wanting to schedule CV.  Pt says Rob had tried to contact him but he can't seem to get in touch with her.        I spoke with Reji in scheduling who will assist pt.    Thank you,    Alissa ERAZO RN  Triage [facility]  10/16/24 15:39 EDT

## 2024-10-21 ENCOUNTER — ANTICOAGULATION VISIT (OUTPATIENT)
Dept: PHARMACY | Facility: HOSPITAL | Age: 64
End: 2024-10-21
Payer: MEDICARE

## 2024-10-21 ENCOUNTER — LAB (OUTPATIENT)
Dept: LAB | Facility: HOSPITAL | Age: 64
End: 2024-10-21
Payer: MEDICARE

## 2024-10-21 ENCOUNTER — TRANSCRIBE ORDERS (OUTPATIENT)
Dept: CARDIOLOGY | Facility: CLINIC | Age: 64
End: 2024-10-21

## 2024-10-21 ENCOUNTER — OFFICE VISIT (OUTPATIENT)
Dept: CARDIOLOGY | Facility: CLINIC | Age: 64
End: 2024-10-21
Payer: MEDICARE

## 2024-10-21 VITALS
DIASTOLIC BLOOD PRESSURE: 70 MMHG | SYSTOLIC BLOOD PRESSURE: 118 MMHG | HEIGHT: 72 IN | BODY MASS INDEX: 38.25 KG/M2 | WEIGHT: 282.4 LBS | HEART RATE: 107 BPM

## 2024-10-21 DIAGNOSIS — I48.19 ATRIAL FIBRILLATION, PERSISTENT: Primary | ICD-10-CM

## 2024-10-21 DIAGNOSIS — Z13.6 SCREENING FOR ISCHEMIC HEART DISEASE: ICD-10-CM

## 2024-10-21 DIAGNOSIS — Z13.6 SCREENING FOR ISCHEMIC HEART DISEASE: Primary | ICD-10-CM

## 2024-10-21 LAB
ANION GAP SERPL CALCULATED.3IONS-SCNC: 10.1 MMOL/L (ref 5–15)
BUN SERPL-MCNC: 14 MG/DL (ref 8–23)
BUN/CREAT SERPL: 15.6 (ref 7–25)
CALCIUM SPEC-SCNC: 9.6 MG/DL (ref 8.6–10.5)
CHLORIDE SERPL-SCNC: 102 MMOL/L (ref 98–107)
CO2 SERPL-SCNC: 20.9 MMOL/L (ref 22–29)
CREAT SERPL-MCNC: 0.9 MG/DL (ref 0.76–1.27)
EGFRCR SERPLBLD CKD-EPI 2021: 95.4 ML/MIN/1.73
GLUCOSE SERPL-MCNC: 232 MG/DL (ref 65–99)
INR PPP: 3 (ref 0.91–1.09)
POTASSIUM SERPL-SCNC: 4.9 MMOL/L (ref 3.5–5.2)
PROTHROMBIN TIME: 36 SECONDS (ref 10–13.8)
SODIUM SERPL-SCNC: 133 MMOL/L (ref 136–145)

## 2024-10-21 PROCEDURE — 93000 ELECTROCARDIOGRAM COMPLETE: CPT | Performed by: INTERNAL MEDICINE

## 2024-10-21 PROCEDURE — 36416 COLLJ CAPILLARY BLOOD SPEC: CPT

## 2024-10-21 PROCEDURE — 99214 OFFICE O/P EST MOD 30 MIN: CPT | Performed by: INTERNAL MEDICINE

## 2024-10-21 PROCEDURE — 36415 COLL VENOUS BLD VENIPUNCTURE: CPT

## 2024-10-21 PROCEDURE — 1160F RVW MEDS BY RX/DR IN RCRD: CPT | Performed by: INTERNAL MEDICINE

## 2024-10-21 PROCEDURE — 3074F SYST BP LT 130 MM HG: CPT | Performed by: INTERNAL MEDICINE

## 2024-10-21 PROCEDURE — 85610 PROTHROMBIN TIME: CPT

## 2024-10-21 PROCEDURE — G0463 HOSPITAL OUTPT CLINIC VISIT: HCPCS

## 2024-10-21 PROCEDURE — 80048 BASIC METABOLIC PNL TOTAL CA: CPT

## 2024-10-21 PROCEDURE — 3078F DIAST BP <80 MM HG: CPT | Performed by: INTERNAL MEDICINE

## 2024-10-21 PROCEDURE — 1159F MED LIST DOCD IN RCRD: CPT | Performed by: INTERNAL MEDICINE

## 2024-10-21 NOTE — PROGRESS NOTES
[facility]      Patient Name: Chad Davidson Jr.  :1960  Age: 64 y.o.  Encounter Provider:  Nagi Chavez Jr, MD      Chief Complaint:   Chief Complaint   Patient presents with    Atrial fibrillation, persistent         HPI  Chad Davidson Jr. is a 64 y.o. male past medical history of uncontrolled diabetes, hypertension who presents for follow-up evaluation of atrial fibrillation.      Last clinic visit note: Patient went to his PCP office for routine follow-up and was found to have irregular rhythm confirmed to be atrial fibrillation on the EKG.  He is asymptomatic.  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  He denies angina.  No orthopnea, PND or edema.  No palpitations, dizziness or syncope.  He was started on metoprolol but had some brain fog and short-term memory loss and stopped the medication on his own.  Resting heart rate today in clinic is 98 to 113 bpm.  Blood pressure seems well-controlled.  He is in atrial fibrillation today on EKG.  Family history reviewed and is noncontributory to clinic visit.  He is a former smoker quit , denies alcohol or illicit drug use.    No bleeding complications on warfarin.  Weekly adjustments of dosing in anticoagulation clinic.  Still in atrial fibrillation today on EKG.  We have had trouble getting a date for cardioversion but he feels comfortable moving forward with the procedure.  No cardiac complaints at time of interview.    The following portions of the patient's history were reviewed and updated as appropriate: allergies, current medications, past family history, past medical history, past social history, past surgical history and problem list.      Review of Systems   Constitutional: Negative for chills and fever.   HENT:  Negative for hoarse voice and sore throat.    Eyes:  Negative for double vision and photophobia.   Cardiovascular:  Negative for chest pain, leg swelling, near-syncope,  "orthopnea, palpitations, paroxysmal nocturnal dyspnea and syncope.   Respiratory:  Negative for cough and wheezing.    Skin:  Negative for poor wound healing and rash.   Musculoskeletal:  Negative for arthritis and joint swelling.   Gastrointestinal:  Negative for bloating, abdominal pain, hematemesis and hematochezia.   Neurological:  Negative for dizziness and focal weakness.   Psychiatric/Behavioral:  Negative for depression and suicidal ideas.      OBJECTIVE:   Vital Signs  Vitals:    10/21/24 0859   BP: 118/70   Pulse: 107     Estimated body mass index is 38.3 kg/m² as calculated from the following:    Height as of this encounter: 182.9 cm (72\").    Weight as of this encounter: 128 kg (282 lb 6.4 oz).    Vitals reviewed.   Constitutional:       Appearance: Healthy appearance. Not in distress.   Neck:      Vascular: No JVR. JVD normal.   Pulmonary:      Effort: Pulmonary effort is normal.      Breath sounds: Normal breath sounds. No wheezing. No rhonchi. No rales.   Chest:      Chest wall: Not tender to palpatation.   Cardiovascular:      PMI at left midclavicular line. Tachycardia present. Irregularly irregular rhythm. Normal S1. Normal S2.       Murmurs: There is no murmur.      No gallop.  No click. No rub.   Pulses:     Intact distal pulses.   Edema:     Peripheral edema absent.   Abdominal:      General: Bowel sounds are normal.      Palpations: Abdomen is soft.      Tenderness: There is no abdominal tenderness.   Musculoskeletal: Normal range of motion.         General: No tenderness. Skin:     General: Skin is warm and dry.   Neurological:      General: No focal deficit present.      Mental Status: Alert and oriented to person, place and time.       ECG 12 Lead    Date/Time: 10/21/2024 9:05 AM  Performed by: Nagi Chavez Jr., MD    Authorized by: Nagi Chavez Jr., MD  Comparison: compared with previous ECG from 8/5/2024  Similar to previous ECG  Rhythm: atrial fibrillation  Rate: tachycardic  Other " findings: non-specific ST-T wave changes    Clinical impression: abnormal EKG      Lipid Panel          6/14/2024    09:48   Lipid Panel   Total Cholesterol 177    Triglycerides 211    HDL Cholesterol 37    VLDL Cholesterol 37    LDL Cholesterol  103    LDL/HDL Ratio 2.64         BUN   Date Value Ref Range Status   09/13/2024 16 8 - 23 mg/dL Final   01/30/2023 11 8 - 26 mg/dL Final     Creatinine   Date Value Ref Range Status   09/13/2024 0.98 0.76 - 1.27 mg/dL Final   01/30/2023 0.74 0.73 - 1.18 mg/dL Final     Potassium   Date Value Ref Range Status   09/13/2024 5.4 (H) 3.5 - 5.2 mmol/L Final   01/30/2023 4.6 3.5 - 5.1 mmol/L Final     ALT (SGPT)   Date Value Ref Range Status   09/13/2024 36 1 - 41 U/L Final     AST (SGOT)   Date Value Ref Range Status   09/13/2024 21 1 - 40 U/L Final           ASSESSMENT:     64-year-old male with uncontrolled diabetes and primary hypertension presents for initial evaluation of persistent atrial fibrillation      PLAN OF CARE:     Persistent atrial fibrillation -[id number] score of 2.  Continue warfarin.  He has been compliant with medication and surveillance has been reviewed.  We will plan for cardioversion at next possible date.  He agrees to go to sleep medicine evaluation for sleep apnea.  Continue diltiazem.  Primary hypertension -appears well-controlled at this time  Uncontrolled diabetes -last A1c greater than 11.  Defer to PCP for ongoing workup and management    Return to clinic next scheduled clinic follow-up             Discharge Medications            Accurate as of October 21, 2024  9:05 AM. If you have any questions, ask your nurse or doctor.                Continue These Medications        Instructions Start Date   allopurinol 300 MG tablet  Commonly known as: ZYLOPRIM   300 mg, Oral, Daily      amLODIPine 10 MG tablet  Commonly known as: NORVASC   10 mg, Oral, Daily      benazepril 20 MG tablet  Commonly known as: LOTENSIN   20 mg, Oral, Daily       cyclobenzaprine 10 MG tablet  Commonly known as: FLEXERIL   10 mg, Oral, 3 Times Daily PRN      dilTIAZem  MG 24 hr capsule  Commonly known as: DILACOR XR   120 mg, Oral, Daily      glimepiride 4 MG tablet  Commonly known as: AMARYL   4 mg, Oral, 2 Times Daily With Meals      hydroCHLOROthiazide 12.5 MG capsule  Commonly known as: MICROZIDE   12.5 mg, Oral, Every Morning      metFORMIN 500 MG tablet  Commonly known as: GLUCOPHAGE   1,000 mg, Oral, 2 Times Daily With Meals      sildenafil 100 MG tablet  Commonly known as: VIAGRA   100 mg, Oral, Daily PRN      vitamin B-12 1000 MCG tablet  Commonly known as: CYANOCOBALAMIN   1,000 mcg, Oral, Daily      Vitamin D 50 MCG (2000 UT) capsule   2,000 Units, Oral, Daily      warfarin 5 MG tablet  Commonly known as: COUMADIN   Take two tablets (10 mg) by mouth Mon, Wed, and Fri, and take one tablets (5 mg) by mouth all other days or as directed.               Thank you for allowing me to participate in the care of your patient,      Sincerely,   Nagi Chavez MD  [facility]  10/21/24  09:05 EDT

## 2024-10-21 NOTE — PROGRESS NOTES
Anticoagulation Clinic Progress Note    Anticoagulation Summary  As of 10/21/2024      INR goal:  2.0-3.0   TTR:  25.2% (2.2 mo)   INR used for dosing:  3.0 (10/21/2024)   Warfarin maintenance plan:  5 mg every Tue, Thu, Sat; 10 mg all other days   Weekly warfarin total:  55 mg   No change documented:  Ashleigh Cordon RPH   Plan last modified:  Tamara Harris RPH (10/14/2024)   Next INR check:  11/4/2024   Target end date:  --    Indications    Atrial fibrillation  persistent [I48.19]                 Anticoagulation Episode Summary       INR check location:  --    Preferred lab:  --    Send INR reminders to:   [facility] [place]    Comments:  Lives [address] ([place], [place]).           Anticoagulation Care Providers       Provider Role Specialty Phone number    Nagi Chavez Jr., MD Referring Cardiology [phone]            Clinic Interview:  Patient Findings     Positives:  Upcoming invasive procedure    Negatives:  Signs/symptoms of thrombosis, Signs/symptoms of bleeding,   Laboratory test error suspected, Change in health, Change in alcohol use,   Change in activity, Emergency department visit, Upcoming dental procedure,   Missed doses, Extra doses, Change in medications, Change in diet/appetite,   Hospital admission, Bruising, Other complaints    Comments:  Patient scheduled to have cardioversion on 10/30/24      Clinical Outcomes     Negatives:  Major bleeding event, Thromboembolic event,   Anticoagulation-related hospital admission, Anticoagulation-related ED   visit, Anticoagulation-related fatality    Comments:  Patient scheduled to have cardioversion on 10/30/24        INR History:      9/6/2024     9:45 AM 9/12/2024     8:45 AM 9/20/2024     9:15 AM 9/30/2024     9:45 AM 10/7/2024    10:00 AM 10/14/2024     9:45 AM 10/21/2024     9:45 AM   Anticoagulation Monitoring   INR 1.5 1.6 2.3 3.4 2.7 3.2 3.0   INR Date 9/6/2024 9/12/2024 9/20/2024 9/30/2024 10/7/2024 10/14/2024 10/21/2024   INR  Goal 2.0-3.0 2.0-3.0 2.0-3.0 2.0-3.0 2.0-3.0 2.0-3.0 2.0-3.0   Trend Up Up Same Same Same Down Same   Last Week Total 42.5 mg 50 mg 60 mg 60 mg 55 mg 60 mg 55 mg   Next Week Total 50 mg 60 mg 60 mg 55 mg 60 mg 55 mg 55 mg   Sun 5 mg 10 mg 10 mg 10 mg 10 mg 10 mg 10 mg   Mon 10 mg 10 mg 10 mg 5 mg (9/30) 10 mg 10 mg 10 mg   Tue 5 mg 5 mg 5 mg 5 mg 5 mg 5 mg 5 mg   Wed 10 mg 10 mg 10 mg 10 mg 10 mg 10 mg 10 mg   Thu - 10 mg 10 mg 10 mg 10 mg 5 mg 5 mg   Fri 10 mg 10 mg 10 mg 10 mg 10 mg 10 mg 10 mg   Sat 5 mg 5 mg 5 mg 5 mg 5 mg 5 mg 5 mg   Visit Report       Report       Plan:  1. INR is Therapeutic today- see above in Anticoagulation Summary.  Will instruct Chad Davidson Jr. to Continue their warfarin regimen- see above in Anticoagulation Summary.  2. Follow up in 2 weeks  3. Patient declines warfarin refills.  4. Verbal and written information provided. Patient expresses understanding and has no further questions at this time.    Ashleigh Cordon formerly [facility]

## 2024-10-30 ENCOUNTER — ANESTHESIA EVENT (OUTPATIENT)
Dept: POSTOP/PACU | Facility: HOSPITAL | Age: 64
End: 2024-10-30
Payer: MEDICARE

## 2024-10-30 ENCOUNTER — ANESTHESIA (OUTPATIENT)
Dept: POSTOP/PACU | Facility: HOSPITAL | Age: 64
End: 2024-10-30
Payer: MEDICARE

## 2024-10-30 ENCOUNTER — HOSPITAL ENCOUNTER (OUTPATIENT)
Dept: POSTOP/PACU | Facility: HOSPITAL | Age: 64
Discharge: HOME | End: 2024-10-30
Payer: MEDICARE

## 2024-10-30 VITALS
OXYGEN SATURATION: 96 % | RESPIRATION RATE: 16 BRPM | HEART RATE: 78 BPM | SYSTOLIC BLOOD PRESSURE: 132 MMHG | DIASTOLIC BLOOD PRESSURE: 91 MMHG

## 2024-10-30 VITALS
DIASTOLIC BLOOD PRESSURE: 77 MMHG | OXYGEN SATURATION: 95 % | SYSTOLIC BLOOD PRESSURE: 123 MMHG | HEART RATE: 73 BPM | RESPIRATION RATE: 17 BRPM

## 2024-10-30 DIAGNOSIS — I48.19 ATRIAL FIBRILLATION, PERSISTENT: ICD-10-CM

## 2024-10-30 LAB
ANION GAP SERPL CALCULATED.3IONS-SCNC: 9.4 MMOL/L (ref 5–15)
BUN SERPL-MCNC: 16 MG/DL (ref 8–23)
BUN/CREAT SERPL: 20.8 (ref 7–25)
CALCIUM SPEC-SCNC: 8.9 MG/DL (ref 8.6–10.5)
CHLORIDE SERPL-SCNC: 107 MMOL/L (ref 98–107)
CO2 SERPL-SCNC: 22.6 MMOL/L (ref 22–29)
CREAT SERPL-MCNC: 0.77 MG/DL (ref 0.76–1.27)
EGFRCR SERPLBLD CKD-EPI 2021: 100 ML/MIN/1.73
GLUCOSE BLDC GLUCOMTR-MCNC: 199 MG/DL (ref 70–130)
GLUCOSE SERPL-MCNC: 223 MG/DL (ref 65–99)
INR PPP: 2.86 (ref 0.9–1.1)
POTASSIUM SERPL-SCNC: 4 MMOL/L (ref 3.5–5.2)
PROTHROMBIN TIME: 30.3 SECONDS (ref 11.7–14.2)
QT INTERVAL: 337 MS
QT INTERVAL: 374 MS
QTC INTERVAL: 418 MS
QTC INTERVAL: 431 MS
SODIUM SERPL-SCNC: 139 MMOL/L (ref 136–145)

## 2024-10-30 PROCEDURE — 25010000002 GLYCOPYRROLATE 0.2 MG/ML SOLUTION: Performed by: NURSE ANESTHETIST, CERTIFIED REGISTERED

## 2024-10-30 PROCEDURE — 25010000002 PROPOFOL 10 MG/ML EMULSION: Performed by: NURSE ANESTHETIST, CERTIFIED REGISTERED

## 2024-10-30 PROCEDURE — 82948 REAGENT STRIP/BLOOD GLUCOSE: CPT

## 2024-10-30 PROCEDURE — 92960 CARDIOVERSION ELECTRIC EXT: CPT

## 2024-10-30 PROCEDURE — 80048 BASIC METABOLIC PNL TOTAL CA: CPT | Performed by: INTERNAL MEDICINE

## 2024-10-30 PROCEDURE — 25010000002 LIDOCAINE 2% SOLUTION: Performed by: NURSE ANESTHETIST, CERTIFIED REGISTERED

## 2024-10-30 PROCEDURE — 25810000003 SODIUM CHLORIDE 0.9 % SOLUTION: Performed by: NURSE ANESTHETIST, CERTIFIED REGISTERED

## 2024-10-30 PROCEDURE — 93005 ELECTROCARDIOGRAM TRACING: CPT | Performed by: INTERNAL MEDICINE

## 2024-10-30 PROCEDURE — 85610 PROTHROMBIN TIME: CPT | Performed by: INTERNAL MEDICINE

## 2024-10-30 RX ORDER — SODIUM CHLORIDE 9 MG/ML
INJECTION, SOLUTION INTRAVENOUS CONTINUOUS PRN
Status: DISCONTINUED | OUTPATIENT
Start: 2024-10-30 | End: 2024-10-30 | Stop reason: SURG

## 2024-10-30 RX ORDER — PROPOFOL 10 MG/ML
VIAL (ML) INTRAVENOUS AS NEEDED
Status: DISCONTINUED | OUTPATIENT
Start: 2024-10-30 | End: 2024-10-30 | Stop reason: SURG

## 2024-10-30 RX ORDER — GLYCOPYRROLATE 0.2 MG/ML
INJECTION INTRAMUSCULAR; INTRAVENOUS AS NEEDED
Status: DISCONTINUED | OUTPATIENT
Start: 2024-10-30 | End: 2024-10-30 | Stop reason: SURG

## 2024-10-30 RX ORDER — LIDOCAINE HYDROCHLORIDE 20 MG/ML
INJECTION, SOLUTION INFILTRATION; PERINEURAL AS NEEDED
Status: DISCONTINUED | OUTPATIENT
Start: 2024-10-30 | End: 2024-10-30 | Stop reason: SURG

## 2024-10-30 RX ADMIN — PROPOFOL 100 MG: 10 INJECTION, EMULSION INTRAVENOUS at 07:48

## 2024-10-30 RX ADMIN — GLYCOPYRROLATE 0.2 MG: 0.2 INJECTION INTRAMUSCULAR; INTRAVENOUS at 07:46

## 2024-10-30 RX ADMIN — SODIUM CHLORIDE: 9 INJECTION, SOLUTION INTRAVENOUS at 07:46

## 2024-10-30 RX ADMIN — LIDOCAINE HYDROCHLORIDE 80 MG: 20 INJECTION, SOLUTION INFILTRATION; PERINEURAL at 07:48

## 2024-10-30 RX ADMIN — PROPOFOL 200 MCG/KG/MIN: 10 INJECTION, EMULSION INTRAVENOUS at 07:49

## 2024-10-30 NOTE — ANESTHESIA PREPROCEDURE EVALUATION
Anesthesia Evaluation     Patient summary reviewed   no history of anesthetic complications:   NPO Solid Status: > 8 hours  NPO Liquid Status: > 2 hours           Airway   Mallampati: II  TM distance: >3 FB  Neck ROM: full  No difficulty expected  Dental      Pulmonary     breath sounds clear to auscultation  (+) a smoker Former,  (-) shortness of breath, recent URI  Cardiovascular   Exercise tolerance: good (4-7 METS)    ECG reviewed  PT is on anticoagulation therapy  Rhythm: irregular  Rate: normal    (+) hypertension, dysrhythmias Atrial Fib, hyperlipidemia  (-) past MI, angina    ROS comment: 9/2024 ECHO - Narrative  ·  Left ventricular systolic function is normal. Left ventricular ejection  fraction appears to be 56 - 60%.  ·  Left ventricular diastolic function was indeterminate.  ·  There is moderate calcification of the aortic valve without any  significant stenosis or regurgitation noted..      Neuro/Psych  (-) seizures, CVA  GI/Hepatic/Renal/Endo    (+) morbid obesity, renal disease (Cr 0.9)- CRI, diabetes mellitus (HbA1c 11.2) type 2 poorly controlled    Musculoskeletal     (-) neck stiffness  Abdominal    Substance History      OB/GYN          Other   arthritis,     (-) blood dyscrasia  ROS/Med Hx Other: Last warfarin 10/29, INR 3.0                    Anesthesia Plan    ASA 3     MAC     (MAC anesthesia discussed with patient and/or patient representative. Risks (including but not limited to intra-op awareness), benefits, and alternatives were discussed. Understanding was voiced with an agreement to proceed with a MAC technique and General as a backup option. )    Anesthetic plan, risks, benefits, and alternatives have been provided, discussed and informed consent has been obtained with: patient.        CODE STATUS:

## 2024-10-30 NOTE — H&P
H&P reviewed agree with details.  Risks and benefits of procedure were explained in detail.  Questions and concerns were answered at bedside.  No contraindications to moving forward with procedure.  Follow diagnostic testing results for further treatment recommendations.              [facility]      Patient Name: Chad Davidson Jr.  :1960  Age: 64 y.o.  Encounter Provider:  Nagi Chavez Jr, MD      Chief Complaint:   No chief complaint on file.        HPI  Chad Davidson Jr. is a 64 y.o. male past medical history of uncontrolled diabetes, hypertension who presents for follow-up evaluation of atrial fibrillation.      Last clinic visit note: Patient went to his PCP office for routine follow-up and was found to have irregular rhythm confirmed to be atrial fibrillation on the EKG.  He is asymptomatic.  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  He denies angina.  No orthopnea, PND or edema.  No palpitations, dizziness or syncope.  He was started on metoprolol but had some brain fog and short-term memory loss and stopped the medication on his own.  Resting heart rate today in clinic is 98 to 113 bpm.  Blood pressure seems well-controlled.  He is in atrial fibrillation today on EKG.  Family history reviewed and is noncontributory to clinic visit.  He is a former smoker quit , denies alcohol or illicit drug use.    No bleeding complications on warfarin.  Weekly adjustments of dosing in anticoagulation clinic.  Still in atrial fibrillation today on EKG.  We have had trouble getting a date for cardioversion but he feels comfortable moving forward with the procedure.  No cardiac complaints at time of interview.    The following portions of the patient's history were reviewed and updated as appropriate: allergies, current medications, past family history, past medical history, past social history, past surgical history and problem list.      Review of Systems  "  Constitutional: Negative for chills and fever.   HENT:  Negative for hoarse voice and sore throat.    Eyes:  Negative for double vision and photophobia.   Cardiovascular:  Negative for chest pain, leg swelling, near-syncope, orthopnea, palpitations, paroxysmal nocturnal dyspnea and syncope.   Respiratory:  Negative for cough and wheezing.    Skin:  Negative for poor wound healing and rash.   Musculoskeletal:  Negative for arthritis and joint swelling.   Gastrointestinal:  Negative for bloating, abdominal pain, hematemesis and hematochezia.   Neurological:  Negative for dizziness and focal weakness.   Psychiatric/Behavioral:  Negative for depression and suicidal ideas.        OBJECTIVE:   Vital Signs  Vitals:    10/30/24 0715   BP: 139/88   Pulse: 89   Resp:    SpO2: 94%     Estimated body mass index is 38.3 kg/m² as calculated from the following:    Height as of 10/21/24: 182.9 cm (72\").    Weight as of 10/21/24: 128 kg (282 lb 6.4 oz).    Vitals reviewed.   Constitutional:       Appearance: Healthy appearance. Not in distress.   Neck:      Vascular: No JVR. JVD normal.   Pulmonary:      Effort: Pulmonary effort is normal.      Breath sounds: Normal breath sounds. No wheezing. No rhonchi. No rales.   Chest:      Chest wall: Not tender to palpatation.   Cardiovascular:      PMI at left midclavicular line. Tachycardia present. Irregularly irregular rhythm. Normal S1. Normal S2.       Murmurs: There is no murmur.      No gallop.  No click. No rub.   Pulses:     Intact distal pulses.   Edema:     Peripheral edema absent.   Abdominal:      General: Bowel sounds are normal.      Palpations: Abdomen is soft.      Tenderness: There is no abdominal tenderness.   Musculoskeletal: Normal range of motion.         General: No tenderness. Skin:     General: Skin is warm and dry.   Neurological:      General: No focal deficit present.      Mental Status: Alert and oriented to person, place and time.       Procedures  Lipid Panel "          6/14/2024    09:48   Lipid Panel   Total Cholesterol 177    Triglycerides 211    HDL Cholesterol 37    VLDL Cholesterol 37    LDL Cholesterol  103    LDL/HDL Ratio 2.64         BUN   Date Value Ref Range Status   10/30/2024 16 8 - 23 mg/dL Final   01/30/2023 11 8 - 26 mg/dL Final     Creatinine   Date Value Ref Range Status   10/30/2024 0.77 0.76 - 1.27 mg/dL Final   01/30/2023 0.74 0.73 - 1.18 mg/dL Final     Potassium   Date Value Ref Range Status   10/30/2024 4.0 3.5 - 5.2 mmol/L Final   01/30/2023 4.6 3.5 - 5.1 mmol/L Final     ALT (SGPT)   Date Value Ref Range Status   09/13/2024 36 1 - 41 U/L Final     AST (SGOT)   Date Value Ref Range Status   09/13/2024 21 1 - 40 U/L Final           ASSESSMENT:     64-year-old male with uncontrolled diabetes and primary hypertension presents for initial evaluation of persistent atrial fibrillation      PLAN OF CARE:     Persistent atrial fibrillation -[id number] score of 2.  Continue warfarin.  He has been compliant with medication and surveillance has been reviewed.  We will plan for cardioversion at next possible date.  He agrees to go to sleep medicine evaluation for sleep apnea.  Continue diltiazem.  Primary hypertension -appears well-controlled at this time  Uncontrolled diabetes -last A1c greater than 11.  Defer to PCP for ongoing workup and management    Return to clinic next scheduled clinic follow-up             Discharge Medications        ASK your doctor about these medications        Instructions Start Date   allopurinol 300 MG tablet  Commonly known as: ZYLOPRIM   300 mg, Oral, Daily      amLODIPine 10 MG tablet  Commonly known as: NORVASC   10 mg, Oral, Daily      benazepril 20 MG tablet  Commonly known as: LOTENSIN   20 mg, Oral, Daily      cyclobenzaprine 10 MG tablet  Commonly known as: FLEXERIL   10 mg, Oral, 3 Times Daily PRN      dilTIAZem  MG 24 hr capsule  Commonly known as: DILACOR XR   120 mg, Oral, Daily      glimepiride 4 MG  tablet  Commonly known as: AMARYL   4 mg, Oral, 2 Times Daily With Meals      hydroCHLOROthiazide 12.5 MG capsule  Commonly known as: MICROZIDE   12.5 mg, Oral, Every Morning      metFORMIN 500 MG tablet  Commonly known as: GLUCOPHAGE   1,000 mg, Oral, 2 Times Daily With Meals      sildenafil 100 MG tablet  Commonly known as: VIAGRA   100 mg, Oral, Daily PRN      vitamin B-12 1000 MCG tablet  Commonly known as: CYANOCOBALAMIN   1,000 mcg, Oral, Daily      Vitamin D 50 MCG (2000 UT) capsule   2,000 Units, Oral, Daily      warfarin 5 MG tablet  Commonly known as: COUMADIN   Take two tablets (10 mg) by mouth Mon, Wed, and Fri, and take one tablets (5 mg) by mouth all other days or as directed.               Thank you for allowing me to participate in the care of your patient,      Sincerely,   Nagi Chavez MD  [facility]  10/30/24  07:26 EDT

## 2024-10-30 NOTE — ANESTHESIA POSTPROCEDURE EVALUATION
Patient: Chad Davidson Jr.    Procedure Summary       Date: 10/30/24 Room / Location: [facility] PACU    Anesthesia Start: 0746 Anesthesia Stop: 0757    Procedure: CARDIOVERSION EXTERNAL IN CARDIOLOGY DEPARTMENT Diagnosis:       Atrial fibrillation, persistent      (Persistent atrial fibrillation)    Scheduled Providers: Nagi Chavez Jr., MD Provider: Patrice Murphy DO    Anesthesia Type: MAC ASA Status: 3            Anesthesia Type: MAC    Vitals  Vitals Value Taken Time   /88 10/30/24 0830   Temp     Pulse 78 10/30/24 0839   Resp 15 10/30/24 0830   SpO2 98 % 10/30/24 0839   Vitals shown include unfiled device data.        Post Anesthesia Care and Evaluation    Patient location during evaluation: bedside  Patient participation: complete - patient participated  Level of consciousness: awake and alert  Pain management: adequate    Airway patency: patent  Anesthetic complications: No anesthetic complications  PONV Status: controlled  Cardiovascular status: acceptable and hemodynamically stable  Respiratory status: acceptable, spontaneous ventilation and nonlabored ventilation  Hydration status: acceptable    Comments: /91   Pulse 78   Resp 16   SpO2 96%

## 2024-11-08 ENCOUNTER — ANTICOAGULATION VISIT (OUTPATIENT)
Dept: PHARMACY | Facility: HOSPITAL | Age: 64
End: 2024-11-08
Payer: MEDICARE

## 2024-11-08 DIAGNOSIS — I48.19 ATRIAL FIBRILLATION, PERSISTENT: Primary | ICD-10-CM

## 2024-11-08 LAB
INR PPP: 2.7 (ref 0.91–1.09)
PROTHROMBIN TIME: 32.6 SECONDS (ref 10–13.8)

## 2024-11-08 PROCEDURE — 36416 COLLJ CAPILLARY BLOOD SPEC: CPT

## 2024-11-08 PROCEDURE — 85610 PROTHROMBIN TIME: CPT

## 2024-11-08 PROCEDURE — G0463 HOSPITAL OUTPT CLINIC VISIT: HCPCS

## 2024-11-08 NOTE — PROGRESS NOTES
Anticoagulation Clinic Progress Note    Anticoagulation Summary  As of 2024      INR goal:  2.0-3.0   TTR:  41.0% (2.8 mo)   INR used for dosin.7 (2024)   Warfarin maintenance plan:  5 mg every Tue, Thu, Sat; 10 mg all other days   Weekly warfarin total:  55 mg   No change documented:  Lauri Burdick, PharmD   Plan last modified:  Tamara Harris RPH (10/14/2024)   Next INR check:  2024   Target end date:  --    Indications    Atrial fibrillation  persistent [I48.19]                 Anticoagulation Episode Summary       INR check location:  --    Preferred lab:  --    Send INR reminders to:   [facility]    Comments:  Lives [address] ([place], [place]).           Anticoagulation Care Providers       Provider Role Specialty Phone number    Nagi Chavez Jr., MD Referring Cardiology [phone]            Clinic Interview:  Patient Findings     Positives:  Other complaints    Negatives:  Signs/symptoms of thrombosis, Signs/symptoms of bleeding,   Laboratory test error suspected, Change in health, Change in alcohol use,   Change in activity, Upcoming invasive procedure, Emergency department   visit, Upcoming dental procedure, Missed doses, Extra doses, Change in   medications, Change in diet/appetite, Hospital admission, Bruising    Comments:  Cardioversion performed 10/30/24.       Clinical Outcomes     Negatives:  Major bleeding event, Thromboembolic event,   Anticoagulation-related hospital admission, Anticoagulation-related ED   visit, Anticoagulation-related fatality    Comments:  Cardioversion performed 10/30/24.         INR History:      Latest Ref Rng & Units 2024     9:15 AM 2024     9:45 AM 10/7/2024    10:00 AM 10/14/2024     9:45 AM 10/21/2024     9:45 AM 10/30/2024     6:32 AM 2024     9:45 AM   Anticoagulation Monitoring   INR  2.3 3.4 2.7 3.2 3.0  2.7   INR Date  2024 2024 10/7/2024 10/14/2024 10/21/2024  2024   INR Goal  2.0-3.0  2.0-3.0 2.0-3.0 2.0-3.0 2.0-3.0  2.0-3.0   Trend  Same Same Same Down Same  Same   Last Week Total  60 mg 60 mg 55 mg 60 mg 55 mg  55 mg   Next Week Total  60 mg 55 mg 60 mg 55 mg 55 mg  55 mg   Sun  10 mg 10 mg 10 mg 10 mg 10 mg  10 mg   Mon  10 mg 5 mg (9/30) 10 mg 10 mg 10 mg  10 mg   Tue  5 mg 5 mg 5 mg 5 mg 5 mg  5 mg   Wed  10 mg 10 mg 10 mg 10 mg 10 mg  -   Thu  10 mg 10 mg 10 mg 5 mg 5 mg  -   Fri  10 mg 10 mg 10 mg 10 mg 10 mg  10 mg   Sat  5 mg 5 mg 5 mg 5 mg 5 mg  5 mg   Historical INR 0.90 - 1.10      2.86     Visit Report      Report         Plan:  1. INR is Therapeutic today- see above in Anticoagulation Summary.  Will instruct Chad Davidson Jr. to Continue their warfarin regimen- see above in Anticoagulation Summary.  2. Follow up in 1 week.  3. Patient declines warfarin refills.  4. Verbal and written information provided. Patient expresses understanding and has no further questions at this time.    Lauri Burdick, PharmD

## 2024-11-13 ENCOUNTER — ANTICOAGULATION VISIT (OUTPATIENT)
Dept: PHARMACY | Facility: HOSPITAL | Age: 64
End: 2024-11-13
Payer: MEDICARE

## 2024-11-13 DIAGNOSIS — I48.19 ATRIAL FIBRILLATION, PERSISTENT: Primary | ICD-10-CM

## 2024-11-13 LAB
INR PPP: 2.8 (ref 0.91–1.09)
PROTHROMBIN TIME: 33.8 SECONDS (ref 10–13.8)

## 2024-11-13 PROCEDURE — 85610 PROTHROMBIN TIME: CPT

## 2024-11-13 PROCEDURE — G0463 HOSPITAL OUTPT CLINIC VISIT: HCPCS

## 2024-11-13 PROCEDURE — 36416 COLLJ CAPILLARY BLOOD SPEC: CPT

## 2024-11-13 NOTE — PROGRESS NOTES
Anticoagulation Clinic Progress Note    Anticoagulation Summary  As of 2024      INR goal:  2.0-3.0   TTR:  44.2% (3 mo)   INR used for dosin.8 (2024)   Warfarin maintenance plan:  5 mg every Tue, Thu, Sat; 10 mg all other days   Weekly warfarin total:  55 mg   No change documented:  Lauri Burdick, PharmD   Plan last modified:  Tamara Harris RPH (10/14/2024)   Next INR check:  2024   Target end date:  --    Indications    Atrial fibrillation  persistent [I48.19]                 Anticoagulation Episode Summary       INR check location:  --    Preferred lab:  --    [facility]   [facility]    Comments:  Lives [address] ([place], [place]).           Anticoagulation Care Providers       Provider Role Specialty Phone number    Nagi Chavez Jr., MD Referring Cardiology [phone]            Clinic Interview:  Patient Findings     Negatives:  Signs/symptoms of thrombosis, Signs/symptoms of bleeding,   Laboratory test error suspected, Change in health, Change in alcohol use,   Change in activity, Upcoming invasive procedure, Emergency department   visit, Upcoming dental procedure, Missed doses, Extra doses, Change in   medications, Change in diet/appetite, Hospital admission, Bruising, Other   complaints    Comments:  Two weeks post-cardioversion 10/30/24.      Clinical Outcomes     Negatives:  Major bleeding event, Thromboembolic event,   Anticoagulation-related hospital admission, Anticoagulation-related ED   visit, Anticoagulation-related fatality    Comments:  Two weeks post-cardioversion 10/30/24.        INR History:      Latest Ref Rng & Units 2024     9:45 AM 10/7/2024    10:00 AM 10/14/2024     9:45 AM 10/21/2024     9:45 AM 10/30/2024     6:32 AM 2024     9:45 AM 2024     9:45 AM   Anticoagulation Monitoring   INR  3.4 2.7 3.2 3.0  2.7 2.8   INR Date  2024 10/7/2024 10/14/2024 10/21/2024  2024 2024   INR Goal  2.0-3.0 2.0-3.0  2.0-3.0 2.0-3.0  2.0-3.0 2.0-3.0   Trend  Same Same Down Same  Same Same   Last Week Total  60 mg 55 mg 60 mg 55 mg  55 mg 55 mg   Next Week Total  55 mg 60 mg 55 mg 55 mg  55 mg 55 mg   Sun  10 mg 10 mg 10 mg 10 mg  10 mg 10 mg   Mon  5 mg (9/30) 10 mg 10 mg 10 mg  10 mg -   Tue  5 mg 5 mg 5 mg 5 mg  5 mg -   Wed  10 mg 10 mg 10 mg 10 mg  - 10 mg   Thu  10 mg 10 mg 5 mg 5 mg  - 5 mg   Fri  10 mg 10 mg 10 mg 10 mg  10 mg 10 mg   Sat  5 mg 5 mg 5 mg 5 mg  5 mg 5 mg   Historical INR 0.90 - 1.10     2.86      Visit Report     Report          Plan:  1. INR is Therapeutic today- see above in Anticoagulation Summary.  Will instruct Chad Davidson Jr. to Continue their warfarin regimen- see above in Anticoagulation Summary.  2. Follow up in 1 week  3. Patient declines warfarin refills.  4. Verbal and written information provided. Patient expresses understanding and has no further questions at this time.    Lauri Burdick, PharmD

## 2024-11-18 ENCOUNTER — OFFICE VISIT (OUTPATIENT)
Dept: CARDIOLOGY | Facility: CLINIC | Age: 64
End: 2024-11-18
Payer: MEDICARE

## 2024-11-18 ENCOUNTER — ANTICOAGULATION VISIT (OUTPATIENT)
Dept: PHARMACY | Facility: HOSPITAL | Age: 64
End: 2024-11-18
Payer: MEDICARE

## 2024-11-18 VITALS
DIASTOLIC BLOOD PRESSURE: 80 MMHG | HEIGHT: 72 IN | BODY MASS INDEX: 36.7 KG/M2 | WEIGHT: 271 LBS | OXYGEN SATURATION: 98 % | SYSTOLIC BLOOD PRESSURE: 140 MMHG | HEART RATE: 68 BPM

## 2024-11-18 DIAGNOSIS — I48.19 ATRIAL FIBRILLATION, PERSISTENT: Primary | ICD-10-CM

## 2024-11-18 DIAGNOSIS — Z92.89 HISTORY OF CARDIOVERSION: ICD-10-CM

## 2024-11-18 DIAGNOSIS — I10 PRIMARY HYPERTENSION: ICD-10-CM

## 2024-11-18 LAB
INR PPP: 2.1 (ref 0.91–1.09)
PROTHROMBIN TIME: 24.9 SECONDS (ref 10–13.8)

## 2024-11-18 PROCEDURE — 3079F DIAST BP 80-89 MM HG: CPT | Performed by: NURSE PRACTITIONER

## 2024-11-18 PROCEDURE — 1160F RVW MEDS BY RX/DR IN RCRD: CPT | Performed by: NURSE PRACTITIONER

## 2024-11-18 PROCEDURE — 1159F MED LIST DOCD IN RCRD: CPT | Performed by: NURSE PRACTITIONER

## 2024-11-18 PROCEDURE — 85610 PROTHROMBIN TIME: CPT

## 2024-11-18 PROCEDURE — 3077F SYST BP >= 140 MM HG: CPT | Performed by: NURSE PRACTITIONER

## 2024-11-18 PROCEDURE — 93000 ELECTROCARDIOGRAM COMPLETE: CPT | Performed by: NURSE PRACTITIONER

## 2024-11-18 PROCEDURE — 36416 COLLJ CAPILLARY BLOOD SPEC: CPT

## 2024-11-18 PROCEDURE — G0463 HOSPITAL OUTPT CLINIC VISIT: HCPCS

## 2024-11-18 PROCEDURE — 99214 OFFICE O/P EST MOD 30 MIN: CPT | Performed by: NURSE PRACTITIONER

## 2024-11-18 NOTE — PROGRESS NOTES
I have supervised and reviewed the notes, assessments, and/or procedures performed. I concur with the documentation of this patient encounter.    Lauri Burdick, PharmD

## 2024-11-18 NOTE — PROGRESS NOTES
I have supervised and reviewed the notes, assessments, and/or procedures performed. The documented assessment and plan were developed cooperatively. I concur with the documentation of this patient encounter.    Ashleigh Cordon [facility]

## 2024-11-18 NOTE — PROGRESS NOTES
Date of Office Visit: 24  Encounter Provider: AGUSTO Lr  Place of Service: [facility] CARDIOLOGY  Patient Name: Chad Davidson Jr.  :1960    Chief Complaint   Patient presents with    Follow-up   :     HPI: Chad Davidson Jr. is a 64 y.o. male  with hypertension, diabetes mellitus type 2, hyperlipidemia, former smoker, atrial fibrillation status post cardioversion 10/30/2024.        He is followed by Dr. Nagi Chavez for the first time I have reviewed his medical record.    He had persistent atrial fibrillation.  He had echocardiogram on 2024 showing normal ejection fraction, indeterminate diastolic function and moderate calcification of the aortic valve without any significant stenosis or regurgitation.  He had mitral annular calcification with trace MR.  He continued and persistent A-fib and then had successful cardioversion on 10/30/2024 after 2 shocks.    He presents today for hospital follow-up.  He tells me that he does not feel any different however he is having less palpitations.  He states maybe 1 week after the ablation he felt a little palpitation.  He denies chest pain, shortness of breath, edema or dizziness.  He has noticed lower heart rates at home with diltiazem.  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  Occasionally he has a little jolt from the fence.  His warfarin is managed through our INR clinic.  No bleeding concerns.  Allergies   Allergen Reactions    Amoxicillin Shortness Of Breath     Didn't feel well, maybe difficulty breathing    Iodine Shortness Of Breath    Contrast Dye (Echo Or Unknown Ct/Mr) Angioedema    Metoprolol Other (See Comments)     Brain fog and short term memory loss    Penicillins Unknown - High Severity     Beta lactam allergy details  Antibiotic reaction: unknown  Age at reaction: adult  Dose to reaction time: unknown  Reason for antibiotic: unknown  Epinephrine required for reaction?:  "unknown  Tolerated antibiotics: unknown              Family and social history reviewed.     ROS  All other systems were reviewed and are negative          Objective:     Vitals:    11/18/24 1032   BP: 140/80   BP Location: Right arm   Patient Position: Sitting   Cuff Size: Adult   Pulse: 68   SpO2: 98%   Weight: 123 kg (271 lb)   Height: 182.9 cm (72\")     Body mass index is 36.75 kg/m².    PHYSICAL EXAM:  Pulmonary:      Effort: Pulmonary effort is normal.      Breath sounds: Normal breath sounds.   Cardiovascular:      Tachycardia present. Regularly irregular rhythm.           ECG 12 Lead    Date/Time: 11/18/2024 11:13 AM  Performed by: Eliane Gallardo APRN    Authorized by: Eliane Gallardo APRN  Comparison: compared with previous ECG   Similar to previous ECG  Rhythm: atrial fibrillation  BPM: 104            Current Outpatient Medications   Medication Sig Dispense Refill    allopurinol (ZYLOPRIM) 300 MG tablet Take 1 tablet by mouth Daily. 90 tablet 3    amLODIPine (NORVASC) 10 MG tablet Take 1 tablet by mouth Daily. 90 tablet 2    benazepril (LOTENSIN) 20 MG tablet Take 1 tablet by mouth Daily. 90 tablet 1    cyclobenzaprine (FLEXERIL) 10 MG tablet Take 1 tablet by mouth 3 (Three) Times a Day As Needed for Muscle Spasms. 30 tablet 0    dilTIAZem XR (DILACOR XR) 120 MG 24 hr capsule Take 1 capsule by mouth Daily. 30 capsule 11    glimepiride (AMARYL) 4 MG tablet Take 1 tablet by mouth 2 (Two) Times a Day With Meals. 180 tablet 1    hydroCHLOROthiazide (MICROZIDE) 12.5 MG capsule Take 1 capsule by mouth Every Morning. 90 capsule 1    metFORMIN (GLUCOPHAGE) 500 MG tablet Take 2 tablets by mouth 2 (Two) Times a Day With Meals. 360 tablet 1    sildenafil (VIAGRA) 100 MG tablet Take 1 tablet by mouth Daily As Needed for Erectile Dysfunction. 30 tablet 0    warfarin (COUMADIN) 5 MG tablet Take two tablets (10 mg) by mouth Mon, Wed, and Fri, and take one tablets (5 mg) by mouth all other days or as directed. 130 tablet 1 "     No current facility-administered medications for this visit.     Assessment:       Diagnosis Plan   1. Atrial fibrillation, persistent  Holter Monitor - 48 Hour    Ambulatory Referral to Cardiac Electrophysiology      2. History of cardioversion  Holter Monitor - 48 Hour    Ambulatory Referral to Cardiac Electrophysiology      3. Primary hypertension             Orders Placed This Encounter   Procedures    Ambulatory Referral to Cardiac Electrophysiology     Referral Priority:   Routine     Referral Type:   Consultation     Referral Reason:   Specialty Services Required     Referred to Provider:   Blas Segura MD     Requested Specialty:   Cardiac Electrophysiology     Number of Visits Requested:   1    Holter Monitor - 48 Hour     Standing Status:   Future     Standing Expiration Date:   11/18/2025     Order Specific Question:   Reason for exam?     Answer:   AFib     Order Specific Question:   AFib specification?     Answer:   Unspecified Atrial Fibrillation     Order Specific Question:   ICD10 Code:     Answer:   Unspecified atrial fibrillation [[id number]]     Order Specific Question:   Release to patient     Answer:   Routine Release [[id number]]    ECG 12 Lead     This order was created via procedure documentation     Order Specific Question:   Release to patient     Answer:   Routine Release [[id number]]         Plan:   1.  64-year-old gentleman with persistent atrial fibrillation status post cardioversion 10/30/2024 after 2 separate shocks he returned to normal sinus rhythm.  His INR is followed through our anticoagulation clinic  -EKG today shows recurrent atrial fibrillation with a heart rate of 104.  I will have him wear a 48-hour Holter monitor to determine if this is intermittent or persistent.  I also recommend consultation with electrophysiology.  -He is on both diltiazem and amlodipine and I did not make any changes today.  He has no swelling.  2.  Hypertension-blood pressure is little elevated  today but he has not had his medication yet.  Other values look better in the system.  I have encouraged home monitoring  3.  Hyperlipidemia-previously declined statin therapy  4.  Diabetes mellitus type 2 on metformin and Amaryl.  5.  Former smoker  6.  Rotator cuff issues-he will let me know if any surgical plans are made but we need to get his a final plan for A-fib management determined before clearing holding anticoagulation.  He Verbalized understanding            It has been a pleasure to participate in this patient's care.      Thank you,  AGUSTO Lr      **I used Dragon to dictate this note:**

## 2024-11-18 NOTE — PROGRESS NOTES
Anticoagulation Clinic Progress Note    Anticoagulation Summary  As of 2024      INR goal:  2.0-3.0   TTR:  47.2% (3.2 mo)   INR used for dosin.1 (2024)   Warfarin maintenance plan:  5 mg every Tue, Thu, Sat; 10 mg all other days   Weekly warfarin total:  55 mg   No change documented:  Alejandra Rahman RPH   Plan last modified:  Tamara Harris RPH (10/14/2024)   Next INR check:  2024   Target end date:  --    Indications    Atrial fibrillation  persistent [I48.19]                 Anticoagulation Episode Summary       INR check location:  --    Preferred lab:  --    Send INR reminders to:   [facility]    Comments:  Lives [address] ([place], [place]).           Anticoagulation Care Providers       Provider Role Specialty Phone number    Nagi Chavez Jr., MD Referring Cardiology [phone]            Clinic Interview:  Patient Findings     Positives:  Change in diet/appetite    Negatives:  Signs/symptoms of thrombosis, Signs/symptoms of bleeding,   Laboratory test error suspected, Change in health, Change in alcohol use,   Change in activity, Upcoming invasive procedure, Emergency department   visit, Upcoming dental procedure, Missed doses, Extra doses, Change in   medications, Hospital admission, Bruising, Other complaints    Comments:  Three weeks post-cardioversion (10/30/24), patient reports a   salad yesterday out of his normal but otherwise no changes, might have   rotator cuff surgery but nothing scheduled at this time      Clinical Outcomes     Negatives:  Major bleeding event, Thromboembolic event,   Anticoagulation-related hospital admission, Anticoagulation-related ED   visit, Anticoagulation-related fatality    Comments:  Three weeks post-cardioversion (10/30/24), patient reports a   salad yesterday out of his normal but otherwise no changes, might have   rotator cuff surgery but nothing scheduled at this time        INR History:      Latest Ref Rng & Units  10/7/2024    10:00 AM 10/14/2024     9:45 AM 10/21/2024     9:45 AM 10/30/2024     6:32 AM 11/8/2024     9:45 AM 11/13/2024     9:45 AM 11/18/2024    11:45 AM   Anticoagulation Monitoring   INR  2.7 3.2 3.0  2.7 2.8 2.1   INR Date  10/7/2024 10/14/2024 10/21/2024  11/8/2024 11/13/2024 11/18/2024   INR Goal  2.0-3.0 2.0-3.0 2.0-3.0  2.0-3.0 2.0-3.0 2.0-3.0   Trend  Same Down Same  Same Same Same   Last Week Total  55 mg 60 mg 55 mg  55 mg 55 mg 55 mg   Next Week Total  60 mg 55 mg 55 mg  55 mg 55 mg 55 mg   Sun  10 mg 10 mg 10 mg  10 mg 10 mg 10 mg   Mon  10 mg 10 mg 10 mg  10 mg - 10 mg   Tue  5 mg 5 mg 5 mg  5 mg - 5 mg   Wed  10 mg 10 mg 10 mg  - 10 mg 10 mg   Thu  10 mg 5 mg 5 mg  - 5 mg 5 mg   Fri  10 mg 10 mg 10 mg  10 mg 10 mg 10 mg   Sat  5 mg 5 mg 5 mg  5 mg 5 mg 5 mg   Historical INR 0.90 - 1.10    2.86       Visit Report    Report    Report       Plan:  1. INR is Therapeutic today- see above in Anticoagulation Summary.  Will instruct Chad Davidson Jr. to Continue their warfarin regimen- see above in Anticoagulation Summary.  2. Follow up in 1 week  3. Patient declines warfarin refills.  4. Verbal and written information provided. Patient expresses understanding and has no further questions at this time.    Alejandra Rahman [facility]

## 2024-11-25 ENCOUNTER — ANTICOAGULATION VISIT (OUTPATIENT)
Dept: PHARMACY | Facility: HOSPITAL | Age: 64
End: 2024-11-25
Payer: MEDICARE

## 2024-11-25 DIAGNOSIS — I48.19 ATRIAL FIBRILLATION, PERSISTENT: Primary | ICD-10-CM

## 2024-11-25 LAB
INR PPP: 1.7 (ref 0.91–1.09)
PROTHROMBIN TIME: 20.9 SECONDS (ref 10–13.8)

## 2024-11-25 PROCEDURE — 85610 PROTHROMBIN TIME: CPT

## 2024-11-25 PROCEDURE — G0463 HOSPITAL OUTPT CLINIC VISIT: HCPCS

## 2024-11-25 PROCEDURE — 36416 COLLJ CAPILLARY BLOOD SPEC: CPT

## 2024-11-25 NOTE — PROGRESS NOTES
I have supervised and reviewed the notes, assessments, and/or procedures performed. The documented assessment and plan were developed cooperatively. I concur with the documentation of this patient encounter.    Ashleigh Cordon [facility]

## 2024-11-25 NOTE — PROGRESS NOTES
Anticoagulation Clinic Progress Note    Anticoagulation Summary  As of 2024      INR goal:  2.0-3.0   TTR:  45.7% (3.4 mo)   INR used for dosin.7 (2024)   Warfarin maintenance plan:  5 mg every Tue, Thu, Sat; 10 mg all other days   Weekly warfarin total:  55 mg   Plan last modified:  Tamara Harris RPH (10/14/2024)   Next INR check:  2024   Target end date:  --    Indications    Atrial fibrillation  persistent [I48.19]                 Anticoagulation Episode Summary       INR check location:  --    Preferred lab:  --    Send INR reminders to:  [facility] [place]    Comments:  Lives [address] ([place], [place]).           Anticoagulation Care Providers       Provider Role Specialty Phone number    Nagi Chavez Jr., MD Referring Cardiology [phone]            Clinic Interview:  Patient Findings     Positives:  Change in diet/appetite    Negatives:  Signs/symptoms of thrombosis, Signs/symptoms of bleeding,   Laboratory test error suspected, Change in health, Change in alcohol use,   Change in activity, Upcoming invasive procedure, Emergency department   visit, Upcoming dental procedure, Missed doses, Extra doses, Change in   medications, Hospital admission, Bruising, Other complaints    Comments:   patient reports more graham this past week x 3 days, not   typical for him, will avoid graham from now on, will not be having rotator   cuff surgery, otherwise no changes      Clinical Outcomes     Negatives:  Major bleeding event, Thromboembolic event,   Anticoagulation-related hospital admission, Anticoagulation-related ED   visit, Anticoagulation-related fatality    Comments:   patient reports more graham this past week x 3 days, not   typical for him, will avoid graham from now on, will not be having rotator   cuff surgery, otherwise no changes        INR History:      Latest Ref Rng & Units 10/14/2024     9:45 AM 10/21/2024     9:45 AM 10/30/2024     6:32 AM 2024     9:45 AM  11/13/2024     9:45 AM 11/18/2024    11:45 AM 11/25/2024    11:00 AM   Anticoagulation Monitoring   INR  3.2 3.0  2.7 2.8 2.1 1.7   INR Date  10/14/2024 10/21/2024  11/8/2024 11/13/2024 11/18/2024 11/25/2024   INR Goal  2.0-3.0 2.0-3.0  2.0-3.0 2.0-3.0 2.0-3.0 2.0-3.0   Trend  Down Same  Same Same Same Same   Last Week Total  60 mg 55 mg  55 mg 55 mg 55 mg 55 mg   Next Week Total  55 mg 55 mg  55 mg 55 mg 55 mg 60 mg   Sun  10 mg 10 mg  10 mg 10 mg 10 mg 10 mg   Mon  10 mg 10 mg  10 mg - 10 mg 15 mg (11/25); Otherwise 10 mg   Tue  5 mg 5 mg  5 mg - 5 mg 5 mg   Wed  10 mg 10 mg  - 10 mg 10 mg 10 mg   Thu  5 mg 5 mg  - 5 mg 5 mg 5 mg   Fri  10 mg 10 mg  10 mg 10 mg 10 mg 10 mg   Sat  5 mg 5 mg  5 mg 5 mg 5 mg 5 mg   Historical INR 0.90 - 1.10   2.86        Visit Report   Report    Report        Plan:  1. INR is Subtherapeutic today- see above in Anticoagulation Summary.  Will instruct Chad Davidson Jr. to boost today then Continue their warfarin regimen- see above in Anticoagulation Summary.  2. Follow up in 2 weeks  3. Patient declines warfarin refills.  4. Verbal and written information provided. Patient expresses understanding and has no further questions at this time.    Alejandra Rahman RP

## 2024-12-03 DIAGNOSIS — G89.29 CHRONIC LOW BACK PAIN, UNSPECIFIED BACK PAIN LATERALITY, UNSPECIFIED WHETHER SCIATICA PRESENT: ICD-10-CM

## 2024-12-03 DIAGNOSIS — M54.50 CHRONIC LOW BACK PAIN, UNSPECIFIED BACK PAIN LATERALITY, UNSPECIFIED WHETHER SCIATICA PRESENT: ICD-10-CM

## 2024-12-03 DIAGNOSIS — N52.9 ERECTILE DYSFUNCTION, UNSPECIFIED ERECTILE DYSFUNCTION TYPE: ICD-10-CM

## 2024-12-03 DIAGNOSIS — E11.65 UNCONTROLLED TYPE 2 DIABETES MELLITUS WITH HYPERGLYCEMIA: ICD-10-CM

## 2024-12-03 DIAGNOSIS — I10 PRIMARY HYPERTENSION: ICD-10-CM

## 2024-12-03 RX ORDER — CYCLOBENZAPRINE HCL 10 MG
10 TABLET ORAL 3 TIMES DAILY PRN
Qty: 30 TABLET | Refills: 3 | Status: SHIPPED | OUTPATIENT
Start: 2024-12-03

## 2024-12-03 RX ORDER — CYCLOBENZAPRINE HCL 10 MG
10 TABLET ORAL 3 TIMES DAILY PRN
Qty: 30 TABLET | Refills: 0 | OUTPATIENT
Start: 2024-12-03

## 2024-12-03 RX ORDER — SILDENAFIL 100 MG/1
100 TABLET, FILM COATED ORAL DAILY PRN
Qty: 30 TABLET | Refills: 2 | Status: SHIPPED | OUTPATIENT
Start: 2024-12-03

## 2024-12-03 RX ORDER — GLIMEPIRIDE 4 MG/1
4 TABLET ORAL 2 TIMES DAILY WITH MEALS
Qty: 180 TABLET | Refills: 2 | Status: SHIPPED | OUTPATIENT
Start: 2024-12-03

## 2024-12-03 RX ORDER — HYDROCHLOROTHIAZIDE 12.5 MG/1
12.5 CAPSULE ORAL EVERY MORNING
Qty: 90 CAPSULE | Refills: 2 | Status: SHIPPED | OUTPATIENT
Start: 2024-12-03

## 2024-12-04 DIAGNOSIS — E11.65 UNCONTROLLED TYPE 2 DIABETES MELLITUS WITH HYPERGLYCEMIA: ICD-10-CM

## 2024-12-09 ENCOUNTER — ANTICOAGULATION VISIT (OUTPATIENT)
Dept: PHARMACY | Facility: HOSPITAL | Age: 64
End: 2024-12-09
Payer: MEDICARE

## 2024-12-09 DIAGNOSIS — I48.19 ATRIAL FIBRILLATION, PERSISTENT: Primary | ICD-10-CM

## 2024-12-09 LAB
INR PPP: 1.8 (ref 0.91–1.09)
PROTHROMBIN TIME: 21.9 SECONDS (ref 10–13.8)

## 2024-12-09 PROCEDURE — 85610 PROTHROMBIN TIME: CPT

## 2024-12-09 PROCEDURE — G0463 HOSPITAL OUTPT CLINIC VISIT: HCPCS

## 2024-12-09 PROCEDURE — 36416 COLLJ CAPILLARY BLOOD SPEC: CPT

## 2024-12-09 NOTE — PROGRESS NOTES
Anticoagulation Clinic Progress Note    Anticoagulation Summary  As of 2024      INR goal:  2.0-3.0   TTR:  40.2% (3.9 mo)   INR used for dosin.8 (2024)   Warfarin maintenance plan:  5 mg every Tue, Thu, Sat; 10 mg all other days   Weekly warfarin total:  55 mg   Plan last modified:  Tamara Harris RPH (10/14/2024)   Next INR check:  2024   Target end date:  --    Indications    Atrial fibrillation  persistent [I48.19]                 Anticoagulation Episode Summary       INR check location:  --    Preferred lab:  --    Send INR reminders to:   [facility] [place]    Comments:  Lives [address] ([place], [place]).           Anticoagulation Care Providers       Provider Role Specialty Phone number    Nagi Chavez Jr., MD Referring Cardiology [phone]            Clinic Interview:      INR History:      Latest Ref Rng & Units 10/21/2024     9:45 AM 10/30/2024     6:32 AM 2024     9:45 AM 2024     9:45 AM 2024    11:45 AM 2024    11:00 AM 2024    10:15 AM   Anticoagulation Monitoring   INR  3.0  2.7 2.8 2.1 1.7 1.8   INR Date  10/21/2024  2024 2024 2024 2024 2024   INR Goal  2.0-3.0  2.0-3.0 2.0-3.0 2.0-3.0 2.0-3.0 2.0-3.0   Trend  Same  Same Same Same Same Same   Last Week Total  55 mg  55 mg 55 mg 55 mg 55 mg 55 mg   Next Week Total  55 mg  55 mg 55 mg 55 mg 60 mg 60 mg   Sun  10 mg  10 mg 10 mg 10 mg 10 mg 10 mg   Mon  10 mg  10 mg - 10 mg 15 mg (); Otherwise 10 mg 15 mg (); Otherwise 10 mg   Tue  5 mg  5 mg - 5 mg 5 mg 5 mg   Wed  10 mg  - 10 mg 10 mg 10 mg 10 mg   Thu  5 mg  - 5 mg 5 mg 5 mg 5 mg   Fri  10 mg  10 mg 10 mg 10 mg 10 mg 10 mg   Sat  5 mg  5 mg 5 mg 5 mg 5 mg 5 mg   Historical INR 0.90 - 1.10  2.86         Visit Report  Report    Report         Plan:  1. INR is Subtherapeutic today- see above in Anticoagulation Summary.  Boost of 15mg today, then cont same, ana in 2 weeks , booking around the  holidays. see above in Anticoagulation Summary.  2. Follow up in 2 weeks  3. Patient declines warfarin refills.  4. Verbal and written information provided. Patient expresses understanding and has no further questions at this time.    Perla Fulton [facility]

## 2024-12-16 ENCOUNTER — TELEPHONE (OUTPATIENT)
Dept: CARDIOLOGY | Facility: CLINIC | Age: 64
End: 2024-12-16
Payer: MEDICARE

## 2024-12-16 NOTE — TELEPHONE ENCOUNTER
Called and left VM, will continue to try to reach pt.    HUB- please put patient straight through to triage    Marianne Sharma, RN  Triage RN  12/16/24 08:48 EST

## 2024-12-16 NOTE — TELEPHONE ENCOUNTER
Please inform patient his monitor shows continuous atrial fibrillation with average heart rate slightly elevated at 102 bpm.  I previously placed a referral to electrophysiology for patient to see Dr. Blas Segura but that appointment has not been scheduled.  If he is still agreeable to meet with electrophysiology can we please ensure he gets an appointment for new patient

## 2024-12-16 NOTE — TELEPHONE ENCOUNTER
EMERGENCY DEPARTMENT HISTORY AND PHYSICAL EXAM    Date: 6/18/2019  Patient Name: Emi Zimmerman    History of Presenting Illness     Chief Complaint   Patient presents with    Eye Injury     left         History Provided By: patient        Additional History (Context): Emi Zimmerman is a 79-year-old female with history of hypertension presenting to the emergency room status post left eye injury. Patient states she is visiting from [place], was packing herself and dog stuff and accidentally poked herself in the eye with metal dog crate. Patient states she noticed some redness to the eye and has slight irritation but denies change in vision, eye pain, drainage, headache, dizziness or any other complaints at this time. Last tetanus unknown. Patient does not wear contact lenses. PCP: None    Current Facility-Administered Medications   Medication Dose Route Frequency Provider Last Rate Last Dose    diph,Pertuss(AC),Tet Vac-PF (BOOSTRIX) suspension 0.5 mL  0.5 mL IntraMUSCular PRIOR TO DISCHARGE Maria T Davis PA-C        fluorescein (FUL-MARIMAR) 1 mg ophthalmic strip 1 Strip  1 Strip Both Eyes NOW Maria T Davis PA-C        proparacaine (OPTHAINE) 0.5 % ophthalmic solution 2 Drop  2 Drop Both Eyes NOW Maria T Davis PA-C        ibuprofen (MOTRIN) tablet 800 mg  800 mg Oral NOW Maria T Davis PA-C           Past History     Past Medical History:  Past Medical History:   Diagnosis Date    Hyperlipemia     Hypertension        Past Surgical History:  History reviewed. No pertinent surgical history. Family History:  History reviewed. No pertinent family history. Social History:  Social History     Tobacco Use    Smoking status: Never Smoker    Smokeless tobacco: Never Used   Substance Use Topics    Alcohol use: Never     Frequency: Never    Drug use: Never       Allergies:  No Known Allergies      Review of Systems       Review of Systems   Constitutional: Negative for chills and fever. I called and left a voicemail for the patient about scheduling   HENT: Negative for nasal congestion, sore throat, rhinorrhea  Eyes: Negative. Positive to injury  Respiratory: Negative for cough and negative for shortness of breath. Cardiovascular: Negative for chest pain and palpitations. Gastrointestinal: Negative for abdominal pain, constipation, diarrhea, nausea and vomiting. Genitourinary: Negative. Negative for difficulty urinating and flank pain. Musculoskeletal: Negative for back pain. Negative for gait problem and neck pain. Skin: Negative. Allergic/Immunologic: Negative. Neurological: Negative for dizziness, weakness, numbness and headaches. Psychiatric/Behavioral: Negative. All other systems reviewed and are negative. All Other Systems Negative  Physical Exam     Vitals:    06/18/19 2203   BP: 131/78   Pulse: 64   Resp: 16   Temp: 98.1 °F (36.7 °C)   SpO2: 99%   Weight: 96.6 kg (213 lb)   Height: 5' 6\" (1.676 m)     Physical Exam   Constitutional: She is oriented to person, place, and time. She appears well-developed and well-nourished. No distress. HENT:   Head: Normocephalic and atraumatic. Nose: Nose normal.   Eyes: Pupils are equal, round, and reactive to light. EOM are normal. Left eye exhibits no chemosis, no discharge, no exudate and no hordeolum. No foreign body present in the left eye. Left conjunctiva is not injected. Left conjunctiva has a hemorrhage. Left eye exhibits normal extraocular motion and no nystagmus. Left pupil is round and reactive. Pupils are equal.   No periorbital redness, tenderness, full range of motion of EOM muscles,, Ocular pressure normal.   Neck: Normal range of motion. Neck supple. Cardiovascular: Normal rate and regular rhythm. Pulmonary/Chest: Effort normal and breath sounds normal. No respiratory distress. Abdominal: Soft. Musculoskeletal: Normal range of motion. Neurological: She is alert and oriented to person, place, and time. No cranial nerve deficit.  Coordination normal.   Skin: Skin is warm. No rash noted. She is not diaphoretic. Psychiatric: She has a normal mood and affect. Her behavior is normal.   Nursing note and vitals reviewed. Diagnostic Study Results     Labs -   No results found for this or any previous visit (from the past 12 hour(s)). Radiologic Studies -   No orders to display     CT Results  (Last 48 hours)    None        CXR Results  (Last 48 hours)    None            Medical Decision Making   I am the first provider for this patient. I reviewed the vital signs, available nursing notes, past medical history, past surgical history, family history and social history. Vital Signs-Reviewed the patient's vital signs. Records Reviewed: Nursing notes, old medical records and any previous labs, imaging, visits, consultations pertinent to patient care    Procedures:  Procedures    Provider Notes (Medical Decision Making): Patient has left sub-conjunctival hemorrhage. No floor seen uptake, no corneal ulcer or abrasion noted patient given expectations for healing and will follow-up with ophthalmologist in the next 1 day. MED RECONCILIATION:  Current Facility-Administered Medications   Medication Dose Route Frequency    diph,Pertuss(AC),Tet Vac-PF (BOOSTRIX) suspension 0.5 mL  0.5 mL IntraMUSCular PRIOR TO DISCHARGE    fluorescein (FUL-MARIMAR) 1 mg ophthalmic strip 1 Strip  1 Strip Both Eyes NOW    proparacaine (OPTHAINE) 0.5 % ophthalmic solution 2 Drop  2 Drop Both Eyes NOW    ibuprofen (MOTRIN) tablet 800 mg  800 mg Oral NOW     No current outpatient medications on file. Disposition:  home    DISCHARGE NOTE:     Pt has been reexamined. Patient has no new complaints, changes, or physical findings. Care plan outlined and precautions discussed. Discussed proper way to take medications. Discussed treatment plan, return precautions, symptomatic relief, and expected time to improvement. All questions answered.  Patient is stable for discharge and outpatient management. Patient is ready to go home. Follow-up Information    None         There are no discharge medications for this patient. Diagnosis     Clinical Impression:   1. Subconjunctival hemorrhage of left eye            Dictation disclaimer:  Please note that this dictation was completed with [employer], the computer voice recognition software. Quite often unanticipated grammatical, syntax, homophones, and other interpretive errors are inadvertently transcribed by the computer software. Please disregard these errors. Please excuse any errors that have escaped final proofreading.

## 2024-12-16 NOTE — TELEPHONE ENCOUNTER
Notified Chad Davidson Jr. of results, patient verbalizes understanding.    Patient is agreeable to see EP.  I told patient I will have Kalyani call him back later this week to get him set up with new pt appt.    Lindsay Ash RN  [facility] Triage  12/16/24 08:55 EST

## 2024-12-26 ENCOUNTER — TELEPHONE (OUTPATIENT)
Dept: PHARMACY | Facility: HOSPITAL | Age: 64
End: 2024-12-26
Payer: MEDICARE

## 2024-12-27 ENCOUNTER — ANTICOAGULATION VISIT (OUTPATIENT)
Dept: PHARMACY | Facility: HOSPITAL | Age: 64
End: 2024-12-27
Payer: MEDICARE

## 2024-12-27 DIAGNOSIS — I48.19 ATRIAL FIBRILLATION, PERSISTENT: Primary | ICD-10-CM

## 2024-12-27 LAB
INR PPP: 2.5 (ref 0.91–1.09)
PROTHROMBIN TIME: 29.8 SECONDS (ref 10–13.8)

## 2024-12-27 PROCEDURE — 36416 COLLJ CAPILLARY BLOOD SPEC: CPT

## 2024-12-27 PROCEDURE — 85610 PROTHROMBIN TIME: CPT

## 2024-12-27 PROCEDURE — G0463 HOSPITAL OUTPT CLINIC VISIT: HCPCS | Performed by: PHARMACIST

## 2024-12-27 NOTE — PROGRESS NOTES
Anticoagulation Clinic Progress Note    Anticoagulation Summary  As of 2024      INR goal:  2.0-3.0   TTR:  44.4% (4.5 mo)   INR used for dosin.5 (2024)   Warfarin maintenance plan:  5 mg every Tue, Thu, Sat; 10 mg all other days   Weekly warfarin total:  55 mg   No change documented:  Jesus Egan, PharmD   Plan last modified:  Tamara Harris RPH (10/14/2024)   Next INR check:  1/10/2025   Target end date:  --    Indications    Atrial fibrillation  persistent [I48.19]                 Anticoagulation Episode Summary       INR check location:  --    Preferred lab:  --    [facility] [place]    Comments:  Lives [address] ([place], [place]).           Anticoagulation Care Providers       Provider Role Specialty Phone number    Nagi Chavez Jr., MD Referring Cardiology [phone]            Clinic Interview:  Patient Findings     Negatives:  Signs/symptoms of thrombosis, Signs/symptoms of bleeding,   Laboratory test error suspected, Change in health, Change in alcohol use,   Change in activity, Upcoming invasive procedure, Emergency department   visit, Upcoming dental procedure, Missed doses, Extra doses, Change in   medications, Change in diet/appetite, Hospital admission, Bruising, Other   complaints      Clinical Outcomes     Negatives:  Major bleeding event, Thromboembolic event,   Anticoagulation-related hospital admission, Anticoagulation-related ED   visit, Anticoagulation-related fatality        INR History:      Latest Ref Rng & Units 10/30/2024     6:32 AM 2024     9:45 AM 2024     9:45 AM 2024    11:45 AM 2024    11:00 AM 2024    10:15 AM 2024    10:00 AM   Anticoagulation Monitoring   INR   2.7 2.8 2.1 1.7 1.8 2.5   INR Date   2024   INR Goal   2.0-3.0 2.0-3.0 2.0-3.0 2.0-3.0 2.0-3.0 2.0-3.0   Trend   Same Same Same Same Same Same   Last Week Total   55 mg  55 mg 55 mg 55 mg 55 mg 55 mg   Next Week Total   55 mg 55 mg 55 mg 60 mg 60 mg 55 mg   Sun   10 mg 10 mg 10 mg 10 mg 10 mg 10 mg   Mon   10 mg - 10 mg 15 mg (11/25); Otherwise 10 mg 15 mg (12/9); Otherwise 10 mg 10 mg   Tue   5 mg - 5 mg 5 mg 5 mg 5 mg   Wed   - 10 mg 10 mg 10 mg 10 mg 10 mg   Thu   - 5 mg 5 mg 5 mg 5 mg 5 mg   Fri   10 mg 10 mg 10 mg 10 mg 10 mg 10 mg   Sat   5 mg 5 mg 5 mg 5 mg 5 mg 5 mg   Historical INR 0.90 - 1.10 2.86          Visit Report     Report          Plan:  1. INR is Therapeutic today- see above in Anticoagulation Summary.  Will instruct Chad Davidson Jr. to Continue their warfarin regimen- see above in Anticoagulation Summary.  2. Follow up in 2 weeks  3. Patient declines warfarin refills.  4. Verbal and written information provided. Patient expresses understanding and has no further questions at this time.    Jesus Egan, PharmD

## 2025-01-16 NOTE — PROGRESS NOTES
"Subjective   Chad Davidson Jr. is a 64 y.o. male.     Chief Complaint   Patient presents with   • Diabetes     Follow up    • Fall     Slipped on Ice went to er via ems pt on blood thinner       History of Present Illness   Patient presents for follow up DM2: takes Metformin and Glimepiride twice daily; last A1c 11.1%; could not afford Jardiance or Farxiga or Januvia; declines insulin; monitors blood sugar, typically runs 180-200s; watches carbs/sugars in diet some; very active on farm; did not see endo as referred.    Had fall on sidewalk coming into office today; slipped on ice; takes Warfarin daily for atrial fib; hit head \"hard\" on concrete when fell; neck feels stiff and sore; no loss of consciousness; has some mild blurry vision.    INR 3.3 earlier today.         The following portions of the patient's history were reviewed and updated as appropriate: allergies, current medications, past family history, past medical history, past social history, past surgical history and problem list.    Current Outpatient Medications on File Prior to Visit   Medication Sig   • allopurinol (ZYLOPRIM) 300 MG tablet Take 1 tablet by mouth Daily.   • amLODIPine (NORVASC) 10 MG tablet Take 1 tablet by mouth Daily.   • benazepril (LOTENSIN) 20 MG tablet Take 1 tablet by mouth Daily.   • cyclobenzaprine (FLEXERIL) 10 MG tablet Take 1 tablet by mouth three times daily as needed for muscle spasm   • glimepiride (AMARYL) 4 MG tablet TAKE 1 TABLET BY MOUTH TWICE DAILY WITH MEALS   • hydroCHLOROthiazide (MICROZIDE) 12.5 MG capsule Take 1 capsule by mouth once daily in the morning   • metFORMIN (GLUCOPHAGE) 500 MG tablet TAKE 2 TABLETS BY MOUTH TWICE DAILY WITH MEALS   • sildenafil (VIAGRA) 100 MG tablet TAKE 1 TABLET BY MOUTH ONCE DAILY AS NEEDED FOR ERECTILE DYSFUNCTION   • warfarin (COUMADIN) 5 MG tablet Take two tablets (10 mg) by mouth Mon, Wed, and Fri, and take one tablets (5 mg) by mouth all other days or as directed.   • " dilTIAZem XR (DILACOR XR) 120 MG 24 hr capsule Take 1 capsule by mouth Daily. (Patient not taking: Reported on 2025)     No current facility-administered medications on file prior to visit.        Past Medical History:   Diagnosis Date   • Acquired dysplasia of oral cavity 2023   • Arthritis    • Brown recluse spider bite     with necrotizing fasciitis   • COVID-19 virus infection 2021   • Diabetes mellitus    • Erectile dysfunction    • Hypertension    • Low back pain    • Parotitis 2019       Past Surgical History:   Procedure Laterality Date   • BACK SURGERY     • COLONOSCOPY  approx     2 polyps removed   • JOINT REPLACEMENT     • REPAIR PATELLAR TENDON Right 2016   • REPLACEMENT TOTAL KNEE Bilateral        Family History   Problem Relation Age of Onset   • Diabetes Father    • Alcohol abuse Father    • Hepatitis Father    • Diabetes Sister    • Hypertension Sister        Social History     Socioeconomic History   • Marital status:    Tobacco Use   • Smoking status: Former     Current packs/day: 0.00     Average packs/day: 2.5 packs/day for 20.0 years (50.0 ttl pk-yrs)     Types: Cigarettes     Start date: 1975     Quit date: 1995     Years since quittin.0     Passive exposure: Past   • Smokeless tobacco: Never   Vaping Use   • Vaping status: Never Used   Substance and Sexual Activity   • Alcohol use: Not Currently     Comment: no ETOH in last 8 years   • Drug use: Never   • Sexual activity: Yes     Partners: Female       Review of Systems   Constitutional:  Negative for chills, diaphoresis, fatigue and fever.   HENT:  Negative for congestion, sore throat, swollen glands and trouble swallowing.    Eyes:  Blurred vision: mild since fall today.   Respiratory:  Negative for cough, chest tightness and shortness of breath.    Cardiovascular:  Negative for chest pain.   Gastrointestinal:  Negative for abdominal pain, constipation, diarrhea, nausea and vomiting.    Genitourinary:  Negative for dysuria.   Musculoskeletal:  Negative for myalgias. Neck pain: mild since fall today.  Skin:  Negative for rash.   Neurological:  Negative for weakness, numbness and headache.       Objective   Vitals:    01/17/25 1315   BP: 140/80   BP Location: Left arm   Patient Position: Sitting   Cuff Size: Adult   Pulse: 53   SpO2: 99%     There is no height or weight on file to calculate BMI.    Physical Exam  Vitals and nursing note reviewed.   Constitutional:       General: He is not in acute distress.     Appearance: He is well-developed and well-groomed. He is not diaphoretic.   HENT:      Head: Normocephalic and atraumatic.      Jaw: No tenderness or pain on movement.        Right Ear: External ear normal. No decreased hearing noted. Right ear middle ear effusion: TM dull. Tympanic membrane is not erythematous.      Left Ear: External ear normal. No decreased hearing noted. Left ear middle ear effusion: TM dull. Tympanic membrane is not erythematous.      Nose: Nose normal.      Right Sinus: No maxillary sinus tenderness or frontal sinus tenderness.      Left Sinus: No maxillary sinus tenderness or frontal sinus tenderness.      Mouth/Throat:      Mouth: Mucous membranes are moist.      Pharynx: Posterior oropharyngeal erythema present. No oropharyngeal exudate.   Eyes:      Extraocular Movements: Extraocular movements intact.      Conjunctiva/sclera: Conjunctivae normal.      Pupils: Pupils are equal, round, and reactive to light.   Neck:      Vascular: No carotid bruit.   Cardiovascular:      Rate and Rhythm: Normal rate. Rhythm regularly irregular.      Pulses: Normal pulses.      Heart sounds: Normal heart sounds.   Pulmonary:      Effort: Pulmonary effort is normal. No respiratory distress.      Breath sounds: Normal breath sounds.   Abdominal:      General: Bowel sounds are normal.      Palpations: Abdomen is soft.      Tenderness: There is no abdominal tenderness. There is no  guarding.   Musculoskeletal:         General: Normal range of motion.      Cervical back: Normal range of motion and neck supple. No bony tenderness.      Thoracic back: No bony tenderness.      Lumbar back: No bony tenderness.      Right lower leg: No edema.      Left lower leg: No edema.   Lymphadenopathy:      Cervical: No cervical adenopathy.   Skin:     General: Skin is warm and dry.   Neurological:      Mental Status: He is alert and oriented to person, place, and time.      Cranial Nerves: No cranial nerve deficit.      Gait: Abnormal gait: guarded gait.      Deep Tendon Reflexes: Reflexes are normal and symmetric.   Psychiatric:         Mood and Affect: Mood normal.         Behavior: Behavior normal.         Thought Content: Thought content normal.         Cognition and Memory: Cognition normal.       Lab Results   Component Value Date    WBC 6.87 09/13/2024    RBC 5.22 09/13/2024    HGB 16.3 09/13/2024    HCT 47.8 09/13/2024    MCV 91.6 09/13/2024    MCH 31.2 09/13/2024    MCHC 34.1 09/13/2024    RDW 13.3 09/13/2024    MPV 10.1 06/24/2019     09/13/2024    NEUTRORELPCT 58.6 09/13/2024    LYMPHORELPCT 26.2 09/13/2024    MONORELPCT 8.3 09/13/2024    EOSRELPCT 5.1 09/13/2024    BASORELPCT 1.2 09/13/2024    AUTOIGPER 0.2 (H) 06/24/2019    NEUTROABS 4.03 09/13/2024    LYMPHSABS 1.80 09/13/2024    MONOSABS 0.57 09/13/2024    EOSABS 0.35 09/13/2024    BASOSABS 0.08 09/13/2024    AUTOIGNUM 0.02 06/24/2019    NRBC 0.0 09/13/2024     Lab Results   Component Value Date    GLUCOSE 223 (H) 10/30/2024    BUN 16 10/30/2024    CREATININE 0.77 10/30/2024    EGFRIFNONA 97 11/09/2021    EGFRIFAFRI >60 01/30/2023    BCR 20.8 10/30/2024    K 4.0 10/30/2024    CO2 22.6 10/30/2024    CALCIUM 8.9 10/30/2024    PROTENTOTREF 6.7 09/13/2024    ALBUMIN 4.2 09/13/2024    LABIL2 1.7 09/13/2024    AST 21 09/13/2024    ALT 36 09/13/2024      Lab Results   Component Value Date    CHLPL 177 06/14/2024    TRIG 211 (H) 06/14/2024     HDL 37 (L) 06/14/2024    VLDL 37 06/14/2024     (H) 06/14/2024     Lab Results   Component Value Date    TSH 3.090 06/14/2024     Lab Results   Component Value Date    HGBA1C 11.10 (H) 09/13/2024     Lab Results   Component Value Date    PSA 0.608 06/14/2024     Lab Results   Component Value Date    WBCU 0-2 10/23/2015    RBCUA None Seen 10/23/2015    BACTERIA None Seen 10/23/2015    LABPH 5.0 10/23/2015    COLORU Yellow 07/14/2015    CLARITYU Clear 10/23/2015    LEUKOCYTESUR Negative 10/23/2015    GLUCOSEU 3+ (A) 10/23/2015    BLOODU Negative 10/23/2015    BILIRUBINUR Negative 10/23/2015    NITRITEU Negative 10/23/2015          Assessment    Problem List Items Addressed This Visit       Uncontrolled type 2 diabetes mellitus with hyperglycemia    Atrial fibrillation, persistent    Chronic anticoagulation     Other Visit Diagnoses       Fall, initial encounter    -  Primary             Return if symptoms worsen or fail to improve.  Called EMS to transport patient to hospital due to hitting head with fall and takes Warfarin daily; INR 3.3 today; pt having some mild blurry vision and neck discomfort since fall.    EMS here 1320 report given

## 2025-01-17 ENCOUNTER — APPOINTMENT (OUTPATIENT)
Dept: CT IMAGING | Facility: HOSPITAL | Age: 65
End: 2025-01-17
Payer: MEDICARE

## 2025-01-17 ENCOUNTER — OFFICE VISIT (OUTPATIENT)
Dept: FAMILY MEDICINE CLINIC | Facility: CLINIC | Age: 65
End: 2025-01-17
Payer: MEDICARE

## 2025-01-17 ENCOUNTER — HOSPITAL ENCOUNTER (EMERGENCY)
Facility: HOSPITAL | Age: 65
Discharge: HOME | End: 2025-01-17
Attending: EMERGENCY MEDICINE
Payer: MEDICARE

## 2025-01-17 ENCOUNTER — ANTICOAGULATION VISIT (OUTPATIENT)
Dept: PHARMACY | Facility: HOSPITAL | Age: 65
End: 2025-01-17
Payer: MEDICARE

## 2025-01-17 VITALS
TEMPERATURE: 98.5 F | DIASTOLIC BLOOD PRESSURE: 88 MMHG | WEIGHT: 271.17 LBS | BODY MASS INDEX: 36.73 KG/M2 | OXYGEN SATURATION: 97 % | SYSTOLIC BLOOD PRESSURE: 126 MMHG | RESPIRATION RATE: 20 BRPM | HEIGHT: 72 IN | HEART RATE: 73 BPM

## 2025-01-17 VITALS — HEART RATE: 53 BPM | OXYGEN SATURATION: 99 % | DIASTOLIC BLOOD PRESSURE: 80 MMHG | SYSTOLIC BLOOD PRESSURE: 140 MMHG

## 2025-01-17 DIAGNOSIS — E11.65 UNCONTROLLED TYPE 2 DIABETES MELLITUS WITH HYPERGLYCEMIA: ICD-10-CM

## 2025-01-17 DIAGNOSIS — Z79.01 CHRONIC ANTICOAGULATION: ICD-10-CM
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
## 2025-01-17 DIAGNOSIS — I48.19 ATRIAL FIBRILLATION, PERSISTENT: Primary | ICD-10-CM

## 2025-01-17 DIAGNOSIS — I48.19 ATRIAL FIBRILLATION, PERSISTENT: ICD-10-CM

## 2025-01-17 DIAGNOSIS — S09.90XA CLOSED HEAD INJURY, INITIAL ENCOUNTER: ICD-10-CM
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
## 2025-01-17 DIAGNOSIS — S16.1XXA STRAIN OF NECK MUSCLE, INITIAL ENCOUNTER: ICD-10-CM

## 2025-01-17 LAB
INR PPP: 3.3 (ref 0.91–1.09)
PROTHROMBIN TIME: 39.2 SECONDS (ref 10–13.8)

## 2025-01-17 PROCEDURE — 36416 COLLJ CAPILLARY BLOOD SPEC: CPT

## 2025-01-17 PROCEDURE — 85610 PROTHROMBIN TIME: CPT

## 2025-01-17 PROCEDURE — G0463 HOSPITAL OUTPT CLINIC VISIT: HCPCS

## 2025-01-17 PROCEDURE — 99284 EMERGENCY DEPT VISIT MOD MDM: CPT

## 2025-01-17 PROCEDURE — 70450 CT HEAD/BRAIN W/O DYE: CPT

## 2025-01-17 PROCEDURE — 72125 CT NECK SPINE W/O DYE: CPT

## 2025-01-17 RX ORDER — ACETAMINOPHEN 500 MG
1000 TABLET ORAL ONCE
Status: COMPLETED | OUTPATIENT
Start: 2025-01-17 | End: 2025-01-17

## 2025-01-17 RX ADMIN — ACETAMINOPHEN 1000 MG: 500 TABLET ORAL at 15:34

## 2025-01-17 NOTE — ED NOTES
Patient arrives via [facility] for complaints of a slip and fall on the ice this morning. Patient hit the back of his head, no lacerations. Patient states he has a headache and neck stiffness. Patient takes Warfrin for A-fib. Alert and oriented x4, ambulatory.

## 2025-01-17 NOTE — ED PROVIDER NOTES
EMERGENCY DEPARTMENT ENCOUNTER    Room Number:  HE2/J  PCP: Eri David APRN  Historian: Patient      HPI:  Chief Complaint: Fall/head trauma  A complete HPI/ROS/PMH/PSH/SH/FH are unobtainable due to: None  Context: Chad Davidson Jr. is a 64 y.o. male who presents to the ED c/o sudden onset of a head injury that occurred shortly prior to ED arrival today.  He reports that he was going to the doctor when outside of the office he slipped on ice and fell falling backwards striking his head on the ground.  He does report that he was dazed for a moment but never lost consciousness.  Since the fall he has developed a mild headache as well as some associated neck discomfort.  He denies dizziness, nausea/vomiting, back pain, extremity pain, chest pain, or diaphoresis.  He does take warfarin on a daily basis with due to atrial fibrillation.            PAST MEDICAL HISTORY  Active Ambulatory Problems     Diagnosis Date Noted    Arthritis 11/09/2021    Chronic low back pain 11/09/2021    Gout 11/09/2021    Hyperlipidemia 11/09/2021    Hypertension 11/09/2021    Class 2 severe obesity with serious comorbidity and body mass index (BMI) of 38.0 to 38.9 in adult 11/09/2021    Uncontrolled type 2 diabetes mellitus with hyperglycemia 11/06/2012    Microalbuminuria 03/03/2022    Type 2 diabetes mellitus with diabetic nephropathy 03/28/2022    Erectile dysfunction 11/03/2022    Persistent proteinuria due to type 2 diabetes mellitus 03/03/2022    Hypercalcemia 05/06/2023    Vitamin D deficiency 06/14/2024    Tachycardia 06/14/2024    Atrial fibrillation 06/14/2024    Atrial fibrillation, persistent 08/05/2024    Refusal of statin medication by patient 09/13/2024    Chronic anticoagulation 01/17/2025     Resolved Ambulatory Problems     Diagnosis Date Noted    Parotitis 06/24/2019    Acquired dysplasia of oral cavity 01/27/2023     Past Medical History:   Diagnosis Date    Brown recluse spider bite     COVID-19 virus infection  2021    Diabetes mellitus     Low back pain          PAST SURGICAL HISTORY  Past Surgical History:   Procedure Laterality Date    BACK SURGERY      COLONOSCOPY  approx 2010    2 polyps removed    JOINT REPLACEMENT      REPAIR PATELLAR TENDON Right 2016    REPLACEMENT TOTAL KNEE Bilateral 2015         FAMILY HISTORY  Family History   Problem Relation Age of Onset    Diabetes Father     Alcohol abuse Father     Hepatitis Father     Diabetes Sister     Hypertension Sister          SOCIAL HISTORY  Social History     Socioeconomic History    Marital status:    Tobacco Use    Smoking status: Former     Current packs/day: 0.00     Average packs/day: 2.5 packs/day for 20.0 years (50.0 ttl pk-yrs)     Types: Cigarettes     Start date: 1975     Quit date: 1995     Years since quittin.0     Passive exposure: Past    Smokeless tobacco: Never   Vaping Use    Vaping status: Never Used   Substance and Sexual Activity    Alcohol use: Not Currently     Comment: no ETOH in last 8 years    Drug use: Never    Sexual activity: Yes     Partners: Female         ALLERGIES  Amoxicillin, Iodine, Contrast dye (echo or unknown ct/mr), Metoprolol, and Penicillins        REVIEW OF SYSTEMS  Review of Systems   Constitutional:  Negative for activity change, appetite change and fever.   HENT:  Negative for congestion and sore throat.    Eyes: Negative.    Respiratory:  Negative for cough and shortness of breath.    Cardiovascular:  Negative for chest pain and leg swelling.   Gastrointestinal:  Negative for abdominal pain, diarrhea and vomiting.   Endocrine: Negative.    Genitourinary:  Negative for decreased urine volume and dysuria.   Musculoskeletal:  Positive for neck pain.   Skin:  Negative for rash and wound.   Allergic/Immunologic: Negative.    Neurological:  Positive for headaches. Negative for weakness and numbness.   Hematological: Negative.    Psychiatric/Behavioral: Negative.     All other systems reviewed and are  negative.         PHYSICAL EXAM  ED Triage Vitals [01/17/25 1411]   Temp Heart Rate Resp BP SpO2   98.5 °F (36.9 °C) 73 20 (!) 158/114 97 %      Temp src Heart Rate Source Patient Position BP Location FiO2 (%)   -- -- -- -- --       Physical Exam  Constitutional:       General: He is not in acute distress.     Appearance: Normal appearance. He is not ill-appearing or toxic-appearing.   HENT:      Head: Normocephalic and atraumatic.   Eyes:      Extraocular Movements: Extraocular movements intact.      Pupils: Pupils are equal, round, and reactive to light.   Cardiovascular:      Rate and Rhythm: Normal rate and regular rhythm.      Heart sounds: No murmur heard.     No friction rub. No gallop.   Pulmonary:      Effort: Pulmonary effort is normal.      Breath sounds: Normal breath sounds.   Abdominal:      General: Abdomen is flat. There is no distension.      Palpations: Abdomen is soft.      Tenderness: There is no abdominal tenderness.   Musculoskeletal:         General: No swelling or tenderness. Normal range of motion.      Cervical back: Normal range of motion and neck supple.   Skin:     General: Skin is warm and dry.   Neurological:      General: No focal deficit present.      Mental Status: He is alert and oriented to person, place, and time.      Sensory: No sensory deficit.      Motor: No weakness.   Psychiatric:         Mood and Affect: Mood normal.         Behavior: Behavior normal.           Vital signs and nursing notes reviewed.          LAB RESULTS  Recent Results (from the past 24 hours)   POC Protime / INR    Collection Time: 01/17/25  9:55 AM    Specimen: Blood   Result Value Ref Range    Protime 39.2 (H) 10.0 - 13.8 seconds    INR 3.3 (H) 0.91 - 1.09       Ordered the above labs and reviewed the results.        RADIOLOGY  CT Cervical Spine Without Contrast    Result Date: 1/17/2025  CT CERVICAL SPINE WO CONTRAST-  INDICATION: Trauma. Neck pain.  COMPARISON: None  TECHNIQUE: CT cervical spine  without IV contrast. Coronal and sagittal reformats. Radiation dose reduction techniques were utilized, including automated exposure control and exposure modulation based on body size.  FINDINGS:  Lung apices: Clear.  Soft tissues: Soft tissue emphysema seen in the bilateral parotid glands, may be in the ductal system or the venous structures, with the soft tissue emphysema appearing tubular. Bilateral carotid bifurcation atherosclerotic calcifications.  Alignment: Cervical spine straightening. No spondylolisthesis.  Osseous structures: No fracture. Degenerative atlantodens articulation. Cervical spondylosis with marginal osteophytosis, greatest at C5/C6 and C6/C7. Facet degenerative arthropathy, most severe on the right at C7/T1 and on the left at C2/C3 and C3/C4, with subchondral cystic changes or erosive changes seen on the left at C3/C4  Spinal canal: No significant spinal canal narrowing seen.       1. No fracture. No spondylolisthesis. 2. Degenerative changes as above. 3. Nonspecific soft tissue emphysema seen in the bilateral parotid glands may be in the ductal system or iatrogenic in the venous system  This report was finalized on 1/17/2025 5:41 PM by Dr. Blas Sen M.D on Workstation: [id number]      CT Head Without Contrast    Result Date: 1/17/2025  CT HEAD WO CONTRAST-  INDICATION: Trauma, headache, fall  COMPARISON: None  TECHNIQUE: Routine CT head without IV contrast. Coronal and sagittal reformats. Radiation dose reduction techniques were utilized, including automated exposure control and exposure modulation based on body size.  FINDINGS:  Brain: No intraparenchymal hemorrhage. Preserved gray-white differentiation. No mass effect or midline shift. Chronic small vessel ischemic changes.  Ventricles: No hydrocephalus. No intraventricular hemorrhage.  Extra-axial spaces: No extra-axial hemorrhage or fluid collection. Small arachnoid cyst seen in the left anterior middle cranial fossa, series 2,  axial mage 19, measures 2.5 x 1.3 cm.  Soft tissues: Foci of soft tissue emphysema seen in the bilateral parotid glands with some possible ill-defined hemorrhage seen lateral to the right parotid gland, series 2, axial image 3, incompletely imaged.  Osseous structures: No fracture. Hyperostosis frontalis interna.  Sinuses: Patent mastoid air cells and middle ears.       1. No acute intracranial process. 2. Some soft tissue emphysema seen in the bilateral parotid glands with some possible ill-defined hemorrhage lateral to the right parotid gland. Clinical correlation.  This report was finalized on 1/17/2025 5:32 PM by Dr. Blas Sen M.D on Workstation: [employer]       Ordered the above noted radiological studies. Reviewed by me in PACS.            PROCEDURES  Procedures            MEDICATIONS GIVEN IN ER  Medications   acetaminophen (TYLENOL) tablet 1,000 mg (1,000 mg Oral Given 1/17/25 1534)                   MEDICAL DECISION MAKING, PROGRESS, and CONSULTS    All labs have been independently reviewed by me.  All radiology studies have been reviewed by me and I have also reviewed the radiology report.   EKG's independently viewed and interpreted by me.  Discussion below represents my analysis of pertinent findings related to patient's condition, differential diagnosis, treatment plan and final disposition.      Additional sources:  - Discussed/ obtained information from independent historians: History obtained from the patient as well as the patient's family at bedside.    - External (non-ED) record review: Upon medical records review, the patient was last seen and evaluated in the outpatient office of family medicine earlier today, 1/17/2025, due to a fall with chronic A-fib and anticoagulation.  He was referred to the emergency room for evaluation.    - Chronic or social conditions impacting care: Anticoagulation dependent history of atrial fibrillation    - Shared decision making: Decision based on shared  conversations have between myself as well as the patient and patient's family at bedside.      Orders placed during this visit:  Orders Placed This Encounter   Procedures    CT Head Without Contrast    CT Cervical Spine Without Contrast             Differential diagnosis includes but is not limited to:    Closed head injury, intracranial hemorrhage, intracranial mass effect, skull fracture, cervical strain, or cervical spine trauma      Independent interpretation of labs, radiology studies, and discussions with consultants:    Independently interpreted by myself with my interpretation showing no skull fracture nor hemorrhage or mass effect.      ED Course as of 01/17/25 1759 Fri Jan 17, 2025 1757 On reevaluation, the patient is resting comfortably and without any further complaints.  He does complain of some generalized soreness but no obvious evidence of a head injury.  I did inform him that the CT head and cervical spine are negative for acute traumatic abnormalities.  At this point, he is stable for discharge and all questions answered. [BM]      ED Course User Index  [BM] Shawn Mancini MD           DIAGNOSIS  Final diagnoses:   Fall from standing, initial encounter   Closed head injury, initial encounter   Strain of neck muscle, initial encounter         DISPOSITION  DISCHARGE    Patient discharged in stable condition.    Reviewed implications of results, diagnosis, meds, responsibility to follow up, warning signs and symptoms of possible worsening, potential complications and reasons to return to ER.    Patient/Family voiced understanding of above instructions.    Discussed plan for discharge, as there is no emergent indication for admission. Patient referred to primary care provider for BP management due to today's BP. Pt/family is agreeable and understands need for follow up and repeat testing.  Pt is aware that discharge does not mean that nothing is wrong but it indicates no emergency is present  that requires admission and they must continue care with follow-up as given below or physician of their choice.     FOLLOW-UP  Eri David, APRN  [address] [facility] [address] [phone]    Schedule an appointment as soon as possible for a visit            Medication List      No changes were made to your prescriptions during this visit.                   Latest Documented Vital Signs:  As of 17:59 EST  BP- 126/88 HR- 73 Temp- 98.5 °F (36.9 °C) O2 sat- 97%              --    Please note that portions of this were completed with a voice recognition program.       Note Disclaimer: At [facility], we believe that sharing information builds trust and better relationships. You are receiving this note because you are receiving care at [facility] or recently visited. It is possible you will see health information before a provider has talked with you about it. This kind of information can be easy to misunderstand. To help you fully understand what it means for your health, we urge you to discuss this note with your provider.             Shawn Mancini MD  01/17/25 [id number]

## 2025-01-17 NOTE — PROGRESS NOTES
Anticoagulation Clinic Progress Note    Anticoagulation Summary  As of 1/17/2025      INR goal:  2.0-3.0   TTR:  46.8% (5.2 mo)   INR used for dosing:  3.3 (1/17/2025)   Warfarin maintenance plan:  5 mg every Tue, Thu, Sat; 10 mg all other days   Weekly warfarin total:  55 mg   Plan last modified:  Tamara Harris RPH (10/14/2024)   Next INR check:  2/7/2025   Target end date:  --    Indications    Atrial fibrillation  persistent [I48.19]                 Anticoagulation Episode Summary       INR check location:  --    Preferred lab:  --    Send INR reminders to:   [facility] [place]    Comments:  Lives [address] ([place], [place]).           Anticoagulation Care Providers       Provider Role Specialty Phone number    Nagi Chavez Jr., MD Referring Cardiology [phone]            Clinic Interview:  Patient Findings     Negatives:  Signs/symptoms of thrombosis, Signs/symptoms of bleeding,   Laboratory test error suspected, Change in health, Change in alcohol use,   Change in activity, Upcoming invasive procedure, Emergency department   visit, Upcoming dental procedure, Missed doses, Extra doses, Change in   medications, Change in diet/appetite, Hospital admission, Bruising, Other   complaints      Clinical Outcomes     Negatives:  Major bleeding event, Thromboembolic event,   Anticoagulation-related hospital admission, Anticoagulation-related ED   visit, Anticoagulation-related fatality        INR History:      11/8/2024     9:45 AM 11/13/2024     9:45 AM 11/18/2024    11:45 AM 11/25/2024    11:00 AM 12/9/2024    10:15 AM 12/27/2024    10:00 AM 1/17/2025    10:00 AM   Anticoagulation Monitoring   INR 2.7 2.8 2.1 1.7 1.8 2.5 3.3   INR Date 11/8/2024 11/13/2024 11/18/2024 11/25/2024 12/9/2024 12/27/2024 1/17/2025   INR Goal 2.0-3.0 2.0-3.0 2.0-3.0 2.0-3.0 2.0-3.0 2.0-3.0 2.0-3.0   Trend Same Same Same Same Same Same Same   Last Week Total 55 mg 55 mg 55 mg 55 mg 55 mg 55 mg 55 mg   Next Week Total 55 mg  55 mg 55 mg 60 mg 60 mg 55 mg 52.5 mg   Sun 10 mg 10 mg 10 mg 10 mg 10 mg 10 mg 10 mg   Mon 10 mg - 10 mg 15 mg (11/25); Otherwise 10 mg 15 mg (12/9); Otherwise 10 mg 10 mg 10 mg   Tue 5 mg - 5 mg 5 mg 5 mg 5 mg 5 mg   Wed - 10 mg 10 mg 10 mg 10 mg 10 mg 10 mg   Thu - 5 mg 5 mg 5 mg 5 mg 5 mg 5 mg   Fri 10 mg 10 mg 10 mg 10 mg 10 mg 10 mg 7.5 mg (1/17); Otherwise 10 mg   Sat 5 mg 5 mg 5 mg 5 mg 5 mg 5 mg 5 mg   Visit Report   Report           Plan:  1. INR is Supratherapeutic today- see above in Anticoagulation Summary.  Will instruct Chad Davidson Jr. to Change their warfarin regimen- see above in Anticoagulation Summary.  No changes, Partial to 7.5 mg then resume regular dosage of warfarin as INR is continuing to fluctuate.     He is under the impression that he will be able to discontinue warfarin after the appointment on 2/7 because he reports his cardioversion and ablation has not worked in the past. He reports he is still in atrial fibrillation. Spent time counseling the patient on the indication of warfarin and that is it used to prevent a stroke or clot while he is in atrial fibrillation. Patient voiced understanding.     2. Follow up in 3 weeks (recommended 2 weeks but patient would like to coordinate with G appointment)  3. Patient declines warfarin refills.  4. Verbal and written information provided. Patient expresses understanding and has no further questions at this time.    [facility], located within [facility]

## 2025-01-31 NOTE — PROGRESS NOTES
[facility] CARDIOLOGY  Clinical Cardiac Electrophysiology     Date of consultation: 2/7/2025  Referring Provider: Eliane Gallardo, AGUSTO  [address]  [facility] [address]  [place] [address]     Reason for consultation: Persistent atrial fibrillation      History of Presenting Illness     OFFICE VISIT  Chad Davidson Jr. is a 64 y.o. male with a past medical history of hypertension, diabetes type 2, hyperlipidemia, atrial fibrillation (s/p cardioversion 10/30/2024) who presents for new patient evaluation.     He was recently seen by Eliane Gallardo on 11/18/2024.  At that time, it was noted that after his cardioversion, he is having fewer palpitations.     Patient presents today with family member.    Patient notes that since he has been diagnosed with atrial fibrillation, he has had episodes of fatigue weakness that were worsened when he was on metoprolol diltiazem.  He has since discontinued those medications.    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  He does work with heavy machinery, barbed wire, electric fence, tractors, cows and calves.    Notably, he notes that a few weeks ago he was all on his way to a doctor's office when he slipped and fell and hit his head.  He required transfer to the emergency room via ambulance at that time for evaluation.  CT head/spine at that time were negative.     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    Arthritis     Brown recluse spider bite     with necrotizing fasciitis    COVID-19 virus infection 11/2021    Diabetes mellitus     Erectile dysfunction     Hypertension     Low back pain     Parotitis 06/24/2019     Past Surgical History:   Procedure Laterality Date    BACK SURGERY      COLONOSCOPY  approx 2010    2 polyps removed    JOINT REPLACEMENT       REPAIR PATELLAR TENDON Right 2016    REPLACEMENT TOTAL KNEE Bilateral 2015         Medications     Current Outpatient Medications on File Prior to Visit   Medication Sig Dispense Refill    allopurinol (ZYLOPRIM) 300 MG tablet Take 1 tablet by mouth Daily. 90 tablet 3    amLODIPine (NORVASC) 10 MG tablet Take 1 tablet by mouth Daily. 90 tablet 2    benazepril (LOTENSIN) 20 MG tablet Take 1 tablet by mouth Daily. 90 tablet 1    cyclobenzaprine (FLEXERIL) 10 MG tablet Take 1 tablet by mouth three times daily as needed for muscle spasm 30 tablet 3    glimepiride (AMARYL) 4 MG tablet TAKE 1 TABLET BY MOUTH TWICE DAILY WITH MEALS 180 tablet 2    hydroCHLOROthiazide (MICROZIDE) 12.5 MG capsule Take 1 capsule by mouth once daily in the morning 90 capsule 2    metFORMIN (GLUCOPHAGE) 500 MG tablet TAKE 2 TABLETS BY MOUTH TWICE DAILY WITH MEALS 360 tablet 0    sildenafil (VIAGRA) 100 MG tablet TAKE 1 TABLET BY MOUTH ONCE DAILY AS NEEDED FOR ERECTILE DYSFUNCTION 30 tablet 2    warfarin (COUMADIN) 5 MG tablet Take two tablets (10 mg) by mouth Mon, Wed, and Fri, and take one tablets (5 mg) by mouth all other days or as directed. 130 tablet 1    [DISCONTINUED] dilTIAZem XR (DILACOR XR) 120 MG 24 hr capsule Take 1 capsule by mouth Daily. (Patient not taking: Reported on 2/7/2025) 30 capsule 11     No current facility-administered medications on file prior to visit.           Allergies     Allergies   Allergen Reactions    Amoxicillin Shortness Of Breath     Didn't feel well, maybe difficulty breathing    Diltiazem Shortness Of Breath    Iodine Shortness Of Breath    Contrast Dye (Echo Or Unknown Ct/Mr) Angioedema    Metoprolol Other (See Comments)     Brain fog and short term memory loss    Penicillins Unknown - High Severity     Beta lactam allergy details  Antibiotic reaction: unknown  Age at reaction: adult  Dose to reaction time: unknown  Reason for antibiotic: unknown  Epinephrine required for reaction?: unknown  Tolerated  "antibiotics: unknown              Family History     Family History   Problem Relation Age of Onset    Diabetes Father     Alcohol abuse Father     Hepatitis Father     Diabetes Sister     Hypertension Sister            Social History     Social History     Socioeconomic History    Marital status:    Tobacco Use    Smoking status: Former     Current packs/day: 0.00     Average packs/day: 2.5 packs/day for 20.0 years (50.0 ttl pk-yrs)     Types: Cigarettes     Start date: 1975     Quit date: 1995     Years since quittin.1     Passive exposure: Past    Smokeless tobacco: Never   Vaping Use    Vaping status: Never Used   Substance and Sexual Activity    Alcohol use: Not Currently     Comment: no ETOH in last 8 years    Drug use: Never    Sexual activity: Yes     Partners: Female        Review of Systems: All systems reviewed. Pertinent positives identified in HPI. All other systems are negative.         Physical Examination     Vitals:    25 1304   BP: 128/86   BP Location: Left arm   Patient Position: Sitting   Cuff Size: Adult   Pulse: 113   Weight: 128 kg (282 lb)   Height: 182.9 cm (72.01\")     Body mass index is 38.24 kg/m².     Gen: Well nourished; Alert  Skin: no visible rashes and no abnormalities with palpation  Eyes: no evidence of visual defects and normal sclera  Ears: no abnormalities.  Mouth: normal teeth and appropriately moist mucous membranes  Chest: clear to auscultation. There is normal respiratory effort and expansion.  CV: examination showed a fast irregular rhythm. S1 and S2 were normal.   Abd: no visible distension.   Neurologic:Cranial nerves appear intact; Sensation normal; no localizing signs        Laboratory Studies (Reviewed by provider)    Lab Results   Component Value Date    WBC 6.87 2024    HGB 16.3 2024    HCT 47.8 2024    MCV 91.6 2024     2024     Lab Results   Component Value Date    GLUCOSE 223 (H) 10/30/2024    BUN 16 " "10/30/2024    CO2 22.6 10/30/2024    CREATININE 0.77 10/30/2024    K 4.0 10/30/2024     10/30/2024     10/30/2024    CALCIUM 8.9 10/30/2024     Lab Results   Component Value Date    MG 1.8 2024     No results found for: \"PHOS\"  Lab Results   Component Value Date    ALT 36 2024    AST 21 2024    ALKPHOS 86 2024    BILITOT 0.8 2024     Lab Results   Component Value Date    TRIG 211 (H) 2024    HDL 37 (L) 2024     (H) 2024     Lab Results   Component Value Date    PTT 27.1 10/23/2015    INR 3.3 (H) 2025     Lab Results   Component Value Date    HGBA1C 11.10 (H) 2024     Lab Results   Component Value Date    TSH 3.090 2024        Investigations (Reviewed by provider)    EKG 25:   ECG 12 Lead    Date/Time: 2025 1:24 PM  Performed by: Blas Segura MD    Authorized by: Blas Segura MD  Comparison: compared with previous ECG from 2024  Similar to previous ECG  Rhythm: atrial fibrillation  QRS axis: right    Clinical impression: abnormal EKG          2024:       ECHOCARDIOGRAPHY: 2024:    Left ventricular systolic function is normal. Left ventricular ejection fraction appears to be 56 - 60%.    Left ventricular diastolic function was indeterminate.    There is moderate calcification of the aortic valve without any significant stenosis or regurgitation noted..        HOLTER/EVENT MONITORIN2024:    An abnormal monitor study.    48-hour Holter monitor continuous atrial fibrillation heart rate ranging 71 to 160 bpm (average 102 bpm).  Rapid ventricular response was noted 41% of the monitored time. Rare this ventricular ectopy with 1 episode of NSVT lasting 3 beats. No significant bradycardia or pauses. No patient triggered or diary events reported.           Assessment & Plan:    # Atrial fibrillation, persistent highly symptomatic with palpitations, decreased energy, fatigue  - S/p cardioversion 10/30/2024 with " improvement in his symptomatic palpitations immediately after cardioversion, but quickly went back into atrial fibrillation  - CHADVASC: [id number] Score: 2  - Anticoagulation with: Warfarin  - Did not previously tolerate metoprolol or diltiazem due to fatigue, decreased energy  - Discussed with patient risk/benefit of increased medical management versus atrial fibrillation ablation.  I discussed with him increasing rate control, antiarrhythmics and repeat cardioversion and ablation.  Described the risk of A-fib ablation, including approximately 1% risk of heart attack, stroke, injury to the heart/lungs/esophagus/blood vessels. Described that the risk of severe injury/illness/death due to the procedure is very small.  - Utilizing shared decision making with family and patient, patient elects to proceed with elective atrial fibrillation ablation.    # Fall from standing  # Concern for striking his head  - Occurred on 1/17/2025.  - He required ambulance transport to the emergency room and had to receive CT scan of his head/neck.  Fortunately no injuries occurred at that time.  - In the setting, he is a poor candidate for long-term coagulation.  - I did discuss with him risk/benefit for watchman implant.  - Discussed the risks/benefits of elective Watchman Implantation, including approximately 1% risk of heart attack, stroke, injury to the heart/lungs/esophagus/blood vessels. Described that the risk of severe injury/illness/death due to the procedure is very small.  - Utilizing shared decision making with family and patient, patient would like think about before deciding.         Follow up in 1 months post-procedure  Plan: patient elects to proceed with elective atrial fibrillation ablation.     As always, Dr. Gallardo, it has been a pleasure to participate in your patient's care. We will continue to follow the patient in our clinic.     This time spent caring for Chad PAN Lety Prado on this date of service includes  time spent by me in the following activities:preparing for the visit, reviewing tests, obtaining and/or reviewing a separately obtained history, performing a medically appropriate examination and/or evaluation, counseling and educating the patient/family/caregiver, documenting information in the medical record, and independently interpreting results and communicating that information with the patient/family/caregiver    Blas Segura MD  Clinical Cardiac Electrophysiology  [facility]

## 2025-02-07 ENCOUNTER — OFFICE VISIT (OUTPATIENT)
Age: 65
End: 2025-02-07
Payer: MEDICARE

## 2025-02-07 VITALS
WEIGHT: 282 LBS | DIASTOLIC BLOOD PRESSURE: 86 MMHG | HEIGHT: 72 IN | SYSTOLIC BLOOD PRESSURE: 128 MMHG | BODY MASS INDEX: 38.19 KG/M2 | HEART RATE: 113 BPM

## 2025-02-07 DIAGNOSIS — I48.19 PERSISTENT ATRIAL FIBRILLATION: Primary | ICD-10-CM

## 2025-02-10 ENCOUNTER — ANTICOAGULATION VISIT (OUTPATIENT)
Dept: PHARMACY | Facility: HOSPITAL | Age: 65
End: 2025-02-10
Payer: MEDICARE

## 2025-02-10 DIAGNOSIS — I48.19 ATRIAL FIBRILLATION, PERSISTENT: Primary | ICD-10-CM

## 2025-02-10 LAB
INR PPP: 1.6 (ref 0.91–1.09)
PROTHROMBIN TIME: 19.3 SECONDS (ref 10–13.8)

## 2025-02-10 PROCEDURE — G0463 HOSPITAL OUTPT CLINIC VISIT: HCPCS

## 2025-02-10 PROCEDURE — 85610 PROTHROMBIN TIME: CPT

## 2025-02-10 NOTE — PROGRESS NOTES
Anticoagulation Clinic Progress Note    Anticoagulation Summary  As of 2/10/2025      INR goal:  2.0-3.0   TTR:  48.4% (6 mo)   INR used for dosin.6 (2/10/2025)   Warfarin maintenance plan:  5 mg every Tue, Thu, Sat; 10 mg all other days   Weekly warfarin total:  55 mg   Plan last modified:  Tamara Harris RPH (10/14/2024)   Next INR check:  2025   Target end date:  --    Indications    Atrial fibrillation  persistent [I48.19]                 Anticoagulation Episode Summary       INR check location:  --    Preferred lab:  --    Send INR reminders to:   [facility] [place]    Comments:  Lives [address] ([place], [place]).           Anticoagulation Care Providers       Provider Role Specialty Phone number    Nagi Chavez Jr., MD Referring Cardiology [phone]            Clinic Interview:  Patient Findings     Positives:  Upcoming invasive procedure, Change in diet/appetite, Other   complaints    Negatives:  Signs/symptoms of thrombosis, Signs/symptoms of bleeding,   Laboratory test error suspected, Change in health, Change in alcohol use,   Change in activity, Emergency department visit, Upcoming dental procedure,   Missed doses, Extra doses, Change in medications, Hospital admission,   Bruising    Comments:  Reports possibly different diet / more vit k over [facility]   weekend (yesterday). Reports he accidentally only took 5 mg of warfarin   yesterday rather than his scheduled 10-mg dose. Ablation scheduled for   3/17/25; he reports Dr. Segura gave him paperwork requesting INR only 3   consecutive weeks leading up to his ablation. He is agreeable to double   checking his paperwork at home and alerting clinic if there is a   discrepancy.      Clinical Outcomes     Negatives:  Major bleeding event, Thromboembolic event,   Anticoagulation-related hospital admission, Anticoagulation-related ED   visit, Anticoagulation-related fatality    Comments:  Reports possibly different diet / more vit k over  super [facility]   weekend (yesterday). Reports he accidentally only took 5 mg of warfarin   yesterday rather than his scheduled 10-mg dose. Ablation scheduled for   3/17/25; he reports Dr. Segura gave him paperwork requesting INR only 3   consecutive weeks leading up to his ablation. He is agreeable to double   checking his paperwork at home and alerting clinic if there is a   discrepancy.        INR History:      11/13/2024     9:45 AM 11/18/2024    11:45 AM 11/25/2024    11:00 AM 12/9/2024    10:15 AM 12/27/2024    10:00 AM 1/17/2025    10:00 AM 2/10/2025     8:30 AM   Anticoagulation Monitoring   INR 2.8 2.1 1.7 1.8 2.5 3.3 1.6   INR Date 11/13/2024 11/18/2024 11/25/2024 12/9/2024 12/27/2024 1/17/2025 2/10/2025   INR Goal 2.0-3.0 2.0-3.0 2.0-3.0 2.0-3.0 2.0-3.0 2.0-3.0 2.0-3.0   Trend Same Same Same Same Same Same Same   Last Week Total 55 mg 55 mg 55 mg 55 mg 55 mg 55 mg 50 mg   Next Week Total 55 mg 55 mg 60 mg 60 mg 55 mg 52.5 mg 60 mg   Sun 10 mg 10 mg 10 mg 10 mg 10 mg 10 mg 10 mg   Mon - 10 mg 15 mg (11/25); Otherwise 10 mg 15 mg (12/9); Otherwise 10 mg 10 mg 10 mg 15 mg (2/10); Otherwise 10 mg   Tue - 5 mg 5 mg 5 mg 5 mg 5 mg 5 mg   Wed 10 mg 10 mg 10 mg 10 mg 10 mg 10 mg 10 mg   Thu 5 mg 5 mg 5 mg 5 mg 5 mg 5 mg 5 mg   Fri 10 mg 10 mg 10 mg 10 mg 10 mg 7.5 mg (1/17); Otherwise 10 mg 10 mg   Sat 5 mg 5 mg 5 mg 5 mg 5 mg 5 mg 5 mg   Visit Report  Report    Report        Plan:  1. INR is Subtherapeutic today- see above in Anticoagulation Summary.  Will instruct Chad Davidson Jr. to Change their warfarin regimen (15 mg today, then resume 5 mg Tues/Thurs/Sat, 10 mg all other days) - see above in Anticoagulation Summary.  2. Follow up in in clinic 2/24/25 then weekly leading up to ablation. However, as above, he is agreeable to double checking his paperwork at home and alerting clinic if there is a discrepancy.   3. Patient declines warfarin refills.  4. Verbal information provided; he declined After Visit Summary.  Patient expresses understanding and has no further questions at this time.    Lauri Burdick, PharmD

## 2025-02-10 NOTE — PROGRESS NOTES
Pt scheduled for a fib ablation in march. He will need 1 week follow up with me set. Please arrange

## 2025-02-12 ENCOUNTER — TELEPHONE (OUTPATIENT)
Dept: FAMILY MEDICINE CLINIC | Facility: CLINIC | Age: 65
End: 2025-02-12
Payer: MEDICARE

## 2025-02-13 NOTE — TELEPHONE ENCOUNTER
Please call patient to schedule a follow up appointment with labs.  At the very least, he needs to get labs drawn.  Thanks.

## 2025-02-14 NOTE — TELEPHONE ENCOUNTER
Tried to call patient but was unable to leave a voicemail. Mailbox was full.       OKAY FOR HUB TO RELAY    Eri would like the patient to schedule an appointment with her for a recheck and for some labs. If he is unable to schedule with Eri, please schedule a LAB ONLY appointment as soon as possible.

## 2025-02-24 ENCOUNTER — ANTICOAGULATION VISIT (OUTPATIENT)
Dept: PHARMACY | Facility: HOSPITAL | Age: 65
End: 2025-02-24
Payer: MEDICARE

## 2025-02-24 DIAGNOSIS — I48.19 ATRIAL FIBRILLATION, PERSISTENT: Primary | ICD-10-CM

## 2025-02-24 LAB
INR PPP: 2.3 (ref 0.91–1.09)
PROTHROMBIN TIME: 27.7 SECONDS (ref 10–13.8)

## 2025-02-24 PROCEDURE — G0463 HOSPITAL OUTPT CLINIC VISIT: HCPCS

## 2025-02-24 PROCEDURE — 85610 PROTHROMBIN TIME: CPT

## 2025-02-24 NOTE — PROGRESS NOTES
Anticoagulation Clinic Progress Note    Anticoagulation Summary  As of 2025      INR goal:  2.0-3.0   TTR:  48.0% (6.4 mo)   INR used for dosin.3 (2025)   Warfarin maintenance plan:  5 mg every Tue, Thu, Sat; 10 mg all other days   Weekly warfarin total:  55 mg   No change documented:  Kinsey Parra   Plan last modified:  Tamara Harris RPH (10/14/2024)   Next INR check:  3/3/2025   Target end date:  --    Indications    Atrial fibrillation  persistent [I48.19]                 Anticoagulation Episode Summary       INR check location:  --    Preferred lab:  --    [facility] [place]    Comments:  Lives [address] ([place], [place]).           Anticoagulation Care Providers       Provider Role Specialty Phone number    Nagi Chavez Jr., MD Referring Cardiology [phone]            Clinic Interview:  Patient Findings     Positives:  Upcoming invasive procedure    Negatives:  Signs/symptoms of thrombosis, Signs/symptoms of bleeding,   Laboratory test error suspected, Change in health, Change in alcohol use,   Change in activity, Emergency department visit, Upcoming dental procedure,   Missed doses, Extra doses, Change in medications, Change in diet/appetite,   Hospital admission, Bruising, Other complaints    Comments:  Ablation 3/17/25      Clinical Outcomes     Negatives:  Major bleeding event, Thromboembolic event,   Anticoagulation-related hospital admission, Anticoagulation-related ED   visit, Anticoagulation-related fatality    Comments:  Ablation 3/17/25        INR History:      2024    11:45 AM 2024    11:00 AM 2024    10:15 AM 2024    10:00 AM 2025    10:00 AM 2/10/2025     8:30 AM 2025     8:00 AM   Anticoagulation Monitoring   INR 2.1 1.7 1.8 2.5 3.3 1.6 2.3   INR Date 2024 2024 2024 2024 2025 2/10/2025 2025   INR Goal 2.0-3.0 2.0-3.0 2.0-3.0 2.0-3.0 2.0-3.0 2.0-3.0 2.0-3.0    Trend Same Same Same Same Same Same Same   Last Week Total 55 mg 55 mg 55 mg 55 mg 55 mg 50 mg 55 mg   Next Week Total 55 mg 60 mg 60 mg 55 mg 52.5 mg 60 mg 55 mg   Sun 10 mg 10 mg 10 mg 10 mg 10 mg 10 mg 10 mg   Mon 10 mg 15 mg (11/25); Otherwise 10 mg 15 mg (12/9); Otherwise 10 mg 10 mg 10 mg 15 mg (2/10); Otherwise 10 mg 10 mg   Tue 5 mg 5 mg 5 mg 5 mg 5 mg 5 mg 5 mg   Wed 10 mg 10 mg 10 mg 10 mg 10 mg 10 mg 10 mg   Thu 5 mg 5 mg 5 mg 5 mg 5 mg 5 mg 5 mg   Fri 10 mg 10 mg 10 mg 10 mg 7.5 mg (1/17); Otherwise 10 mg 10 mg 10 mg   Sat 5 mg 5 mg 5 mg 5 mg 5 mg 5 mg 5 mg   Visit Report Report    Report         Plan:  1. INR is therapeutic today- see above in Anticoagulation Summary.   Will instruct Chad Davidson Jr. to continue their warfarin regimen- see above in Anticoagulation Summary.  2. Follow up in 1 weeks.  3. Patient declines warfarin refills.  4. Verbal and written information provided. Patient expresses understanding and has no further questions at this time.    Kinsey Parra

## 2025-02-25 ENCOUNTER — TELEPHONE (OUTPATIENT)
Dept: FAMILY MEDICINE CLINIC | Facility: CLINIC | Age: 65
End: 2025-02-25
Payer: MEDICARE

## 2025-02-26 NOTE — TELEPHONE ENCOUNTER
OKAY FOR HUB TO RELAY    Unable to leave voicemail. Mail box was full. Eri wants the patient to schedule a follow up for his diabetes.

## 2025-02-26 NOTE — TELEPHONE ENCOUNTER
Please call patient to reschedule appointment regarding diabetes.  He needs labs at the very least.  Thank you.

## 2025-03-03 ENCOUNTER — ANTICOAGULATION VISIT (OUTPATIENT)
Dept: PHARMACY | Facility: HOSPITAL | Age: 65
End: 2025-03-03
Payer: MEDICARE

## 2025-03-03 DIAGNOSIS — I48.19 ATRIAL FIBRILLATION, PERSISTENT: Primary | ICD-10-CM

## 2025-03-03 LAB
INR PPP: 1.8 (ref 0.91–1.09)
PROTHROMBIN TIME: 21.2 SECONDS (ref 10–13.8)

## 2025-03-03 PROCEDURE — G0463 HOSPITAL OUTPT CLINIC VISIT: HCPCS

## 2025-03-03 PROCEDURE — 85610 PROTHROMBIN TIME: CPT

## 2025-03-06 DIAGNOSIS — I10 PRIMARY HYPERTENSION: ICD-10-CM

## 2025-03-06 DIAGNOSIS — E11.65 UNCONTROLLED TYPE 2 DIABETES MELLITUS WITH HYPERGLYCEMIA: ICD-10-CM

## 2025-03-07 RX ORDER — WARFARIN SODIUM 5 MG/1
TABLET ORAL
Qty: 155 TABLET | Refills: 0 | Status: SHIPPED | OUTPATIENT
Start: 2025-03-07

## 2025-03-07 RX ORDER — BENAZEPRIL HYDROCHLORIDE 20 MG/1
20 TABLET ORAL DAILY
Qty: 90 TABLET | Refills: 0 | Status: SHIPPED | OUTPATIENT
Start: 2025-03-07

## 2025-03-10 ENCOUNTER — ANTICOAGULATION VISIT (OUTPATIENT)
Dept: PHARMACY | Facility: HOSPITAL | Age: 65
End: 2025-03-10
Payer: MEDICARE

## 2025-03-10 DIAGNOSIS — I48.19 ATRIAL FIBRILLATION, PERSISTENT: Primary | ICD-10-CM

## 2025-03-10 LAB
INR PPP: 2 (ref 0.91–1.09)
PROTHROMBIN TIME: 23.7 SECONDS (ref 10–13.8)

## 2025-03-10 PROCEDURE — 85610 PROTHROMBIN TIME: CPT

## 2025-03-10 PROCEDURE — G0463 HOSPITAL OUTPT CLINIC VISIT: HCPCS

## 2025-03-10 NOTE — PROGRESS NOTES
Anticoagulation Clinic Progress Note    Anticoagulation Summary  As of 3/10/2025      INR goal:  2.0-3.0   TTR:  46.8% (6.9 mo)   INR used for dosin.0 (3/10/2025)   Warfarin maintenance plan:  5 mg every Tue, Sat; 10 mg all other days   Weekly warfarin total:  60 mg   Plan last modified:  Lauri Burdick, PharmD (3/3/2025)   Next INR check:  3/24/2025   Target end date:  --    Indications    Atrial fibrillation  persistent [I48.19]                 Anticoagulation Episode Summary       INR check location:  --    Preferred lab:  --    Send INR reminders to:   [facility] [place]    Comments:  Lives [address] ([place], [place]).           Anticoagulation Care Providers       Provider Role Specialty Phone number    Nagi Chavez Jr., MD Referring Cardiology [phone]            Clinic Interview:  Patient Findings     Positives:  Upcoming invasive procedure, Missed doses    Negatives:  Signs/symptoms of thrombosis, Signs/symptoms of bleeding,   Laboratory test error suspected, Change in health, Change in alcohol use,   Change in activity, Emergency department visit, Upcoming dental procedure,   Extra doses, Change in medications, Change in diet/appetite, Hospital   admission, Bruising, Other complaints    Comments:  INR=2.0. Pt states he forgot to take his medication last night   but took it this AM. Pt is scheduled for an ablation 3/17/25. Instructed   pt to take 5 mg Tu and then 10 mg all other days of the week leading up to   his procedure, then return back to his normal 5 mg TuSa, 10 mg AODs after.   Pt will return weekly after procedure for INR checks.       Clinical Outcomes     Negatives:  Major bleeding event, Thromboembolic event,   Anticoagulation-related hospital admission, Anticoagulation-related ED   visit, Anticoagulation-related fatality    Comments:  INR=2.0. Pt states he forgot to take his medication last night   but took it this AM. Pt is scheduled for an ablation 3/17/25. Instructed   pt  to take 5 mg Tu and then 10 mg all other days of the week leading up to   his procedure to prevent INR from decreasing any further with upcoming ablation next week, then return back to his normal 5 mg TuSa, 10 mg AODs after.   Pt will return weekly after procedure for INR checks.         INR History:      12/9/2024    10:15 AM 12/27/2024    10:00 AM 1/17/2025    10:00 AM 2/10/2025     8:30 AM 2/24/2025     8:00 AM 3/3/2025     8:30 AM 3/10/2025     8:15 AM   Anticoagulation Monitoring   INR 1.8 2.5 3.3 1.6 2.3 1.8 2.0   INR Date 12/9/2024 12/27/2024 1/17/2025 2/10/2025 2/24/2025 3/3/2025 3/10/2025   INR Goal 2.0-3.0 2.0-3.0 2.0-3.0 2.0-3.0 2.0-3.0 2.0-3.0 2.0-3.0   Trend Same Same Same Same Same Up Same   Last Week Total 55 mg 55 mg 55 mg 50 mg 55 mg 55 mg 55 mg   Next Week Total 60 mg 55 mg 52.5 mg 60 mg 55 mg 65 mg 75 mg   Sun 10 mg 10 mg 10 mg 10 mg 10 mg 10 mg 10 mg   Mon 15 mg (12/9); Otherwise 10 mg 10 mg 10 mg 15 mg (2/10); Otherwise 10 mg 10 mg 15 mg (3/3) 20 mg (3/10); Otherwise 10 mg   Tue 5 mg 5 mg 5 mg 5 mg 5 mg 5 mg 5 mg   Wed 10 mg 10 mg 10 mg 10 mg 10 mg 10 mg 10 mg   Thu 5 mg 5 mg 5 mg 5 mg 5 mg 10 mg 10 mg   Fri 10 mg 10 mg 7.5 mg (1/17); Otherwise 10 mg 10 mg 10 mg 10 mg 10 mg   Sat 5 mg 5 mg 5 mg 5 mg 5 mg 5 mg 10 mg (3/15); Otherwise 5 mg   Visit Report   Report           Plan:  1. INR is Therapeutic today- see above in Anticoagulation Summary.  Will instruct Chad Davidson Jr. to take Continue their warfarin regimen but take 10 mg this Sa vs his normal 5 mg dose for his upcoming procedure (5 mg TuSa, 10 mg AODs)- see above in Anticoagulation Summary.  2. Follow up in 2 weeks  3. Patient declines warfarin refills.  4. Verbal and written information provided. Patient expresses understanding and has no further questions at this time.    Rhonda Marleysbury, Pharmacy Intern

## 2025-03-10 NOTE — PROGRESS NOTES
I have supervised and reviewed the notes, assessments, and/or procedures performed. The documented assessment and plan were developed cooperatively, and the plan was implemented in my presence. I concur with the documentation of this patient encounter.    Tamara Harris, [facility]

## 2025-03-17 ENCOUNTER — ANESTHESIA (OUTPATIENT)
Dept: CARDIOLOGY | Facility: HOSPITAL | Age: 65
End: 2025-03-17
Payer: MEDICARE

## 2025-03-17 ENCOUNTER — HOSPITAL ENCOUNTER (OUTPATIENT)
Facility: HOSPITAL | Age: 65
Setting detail: HOSPITAL OUTPATIENT SURGERY
Discharge: HOME | End: 2025-03-17
Attending: STUDENT IN AN ORGANIZED HEALTH CARE EDUCATION/TRAINING PROGRAM | Admitting: STUDENT IN AN ORGANIZED HEALTH CARE EDUCATION/TRAINING PROGRAM
Payer: MEDICARE

## 2025-03-17 ENCOUNTER — LAB (OUTPATIENT)
Dept: LAB | Facility: HOSPITAL | Age: 65
End: 2025-03-17
Payer: MEDICARE

## 2025-03-17 ENCOUNTER — ANESTHESIA EVENT (OUTPATIENT)
Dept: CARDIOLOGY | Facility: HOSPITAL | Age: 65
End: 2025-03-17
Payer: MEDICARE

## 2025-03-17 VITALS
TEMPERATURE: 98.1 F | OXYGEN SATURATION: 99 % | HEART RATE: 84 BPM | BODY MASS INDEX: 37.11 KG/M2 | WEIGHT: 280 LBS | SYSTOLIC BLOOD PRESSURE: 126 MMHG | RESPIRATION RATE: 16 BRPM | HEIGHT: 73 IN | DIASTOLIC BLOOD PRESSURE: 77 MMHG

## 2025-03-17 DIAGNOSIS — I48.19 PERSISTENT ATRIAL FIBRILLATION: ICD-10-CM

## 2025-03-17 LAB
ACT BLD: 279 SECONDS (ref 82–152)
ACT BLD: 291 SECONDS (ref 82–152)
ALBUMIN SERPL-MCNC: 4 G/DL (ref 3.5–5.2)
ALBUMIN/GLOB SERPL: 1.4 G/DL
ALP SERPL-CCNC: 84 U/L (ref 39–117)
ALT SERPL W P-5'-P-CCNC: 22 U/L (ref 1–41)
ANION GAP SERPL CALCULATED.3IONS-SCNC: 10 MMOL/L (ref 5–15)
AST SERPL-CCNC: 22 U/L (ref 1–40)
BASOPHILS # BLD AUTO: 0.07 10*3/MM3 (ref 0–0.2)
BASOPHILS NFR BLD AUTO: 1 % (ref 0–1.5)
BILIRUB SERPL-MCNC: 0.6 MG/DL (ref 0–1.2)
BUN SERPL-MCNC: 17 MG/DL (ref 8–23)
BUN/CREAT SERPL: 19.1 (ref 7–25)
CALCIUM SPEC-SCNC: 9.2 MG/DL (ref 8.6–10.5)
CHLORIDE SERPL-SCNC: 104 MMOL/L (ref 98–107)
CO2 SERPL-SCNC: 22 MMOL/L (ref 22–29)
CREAT SERPL-MCNC: 0.89 MG/DL (ref 0.76–1.27)
DEPRECATED RDW RBC AUTO: 45.9 FL (ref 37–54)
EGFRCR SERPLBLD CKD-EPI 2021: 95.7 ML/MIN/1.73
EOSINOPHIL # BLD AUTO: 0.39 10*3/MM3 (ref 0–0.4)
EOSINOPHIL NFR BLD AUTO: 5.6 % (ref 0.3–6.2)
ERYTHROCYTE [DISTWIDTH] IN BLOOD BY AUTOMATED COUNT: 13.3 % (ref 12.3–15.4)
GLOBULIN UR ELPH-MCNC: 2.9 GM/DL
GLUCOSE BLDC GLUCOMTR-MCNC: 243 MG/DL (ref 70–130)
GLUCOSE SERPL-MCNC: 275 MG/DL (ref 65–99)
HCT VFR BLD AUTO: 53.4 % (ref 37.5–51)
HGB BLD-MCNC: 17.6 G/DL (ref 13–17.7)
IMM GRANULOCYTES # BLD AUTO: 0.02 10*3/MM3 (ref 0–0.05)
IMM GRANULOCYTES NFR BLD AUTO: 0.3 % (ref 0–0.5)
INR PPP: 3.7 (ref 0.8–1.2)
LYMPHOCYTES # BLD AUTO: 1.95 10*3/MM3 (ref 0.7–3.1)
LYMPHOCYTES NFR BLD AUTO: 28.1 % (ref 19.6–45.3)
MCH RBC QN AUTO: 30.9 PG (ref 26.6–33)
MCHC RBC AUTO-ENTMCNC: 33 G/DL (ref 31.5–35.7)
MCV RBC AUTO: 93.7 FL (ref 79–97)
MONOCYTES # BLD AUTO: 0.63 10*3/MM3 (ref 0.1–0.9)
MONOCYTES NFR BLD AUTO: 9.1 % (ref 5–12)
NEUTROPHILS NFR BLD AUTO: 3.87 10*3/MM3 (ref 1.7–7)
NEUTROPHILS NFR BLD AUTO: 55.9 % (ref 42.7–76)
NRBC BLD AUTO-RTO: 0 /100 WBC (ref 0–0.2)
PLATELET # BLD AUTO: 358 10*3/MM3 (ref 140–450)
PMV BLD AUTO: 9.2 FL (ref 6–12)
POTASSIUM SERPL-SCNC: 5.3 MMOL/L (ref 3.5–5.2)
PROT SERPL-MCNC: 6.9 G/DL (ref 6–8.5)
PROTHROMBIN TIME: 37 SECONDS (ref 12.8–15.2)
QT INTERVAL: 338 MS
QTC INTERVAL: 446 MS
RBC # BLD AUTO: 5.7 10*6/MM3 (ref 4.14–5.8)
SODIUM SERPL-SCNC: 136 MMOL/L (ref 136–145)
WBC NRBC COR # BLD AUTO: 6.93 10*3/MM3 (ref 3.4–10.8)

## 2025-03-17 PROCEDURE — 25810000003 SODIUM CHLORIDE 0.9 % SOLUTION: Performed by: STUDENT IN AN ORGANIZED HEALTH CARE EDUCATION/TRAINING PROGRAM

## 2025-03-17 PROCEDURE — 25010000002 PROTAMINE SULFATE PER 10 MG: Performed by: STUDENT IN AN ORGANIZED HEALTH CARE EDUCATION/TRAINING PROGRAM

## 2025-03-17 PROCEDURE — 93005 ELECTROCARDIOGRAM TRACING: CPT | Performed by: STUDENT IN AN ORGANIZED HEALTH CARE EDUCATION/TRAINING PROGRAM

## 2025-03-17 PROCEDURE — C1732 CATH, EP, DIAG/ABL, 3D/VECT: HCPCS | Performed by: STUDENT IN AN ORGANIZED HEALTH CARE EDUCATION/TRAINING PROGRAM

## 2025-03-17 PROCEDURE — C1759 CATH, INTRA ECHOCARDIOGRAPHY: HCPCS | Performed by: STUDENT IN AN ORGANIZED HEALTH CARE EDUCATION/TRAINING PROGRAM

## 2025-03-17 PROCEDURE — 85610 PROTHROMBIN TIME: CPT | Performed by: STUDENT IN AN ORGANIZED HEALTH CARE EDUCATION/TRAINING PROGRAM

## 2025-03-17 PROCEDURE — 25010000002 PROPOFOL 10 MG/ML EMULSION: Performed by: NURSE ANESTHETIST, CERTIFIED REGISTERED

## 2025-03-17 PROCEDURE — C1766 INTRO/SHEATH,STRBLE,NON-PEEL: HCPCS | Performed by: STUDENT IN AN ORGANIZED HEALTH CARE EDUCATION/TRAINING PROGRAM

## 2025-03-17 PROCEDURE — 25010000002 HEPARIN (PORCINE) PER 1000 UNITS: Performed by: STUDENT IN AN ORGANIZED HEALTH CARE EDUCATION/TRAINING PROGRAM

## 2025-03-17 PROCEDURE — 25010000002 DEXAMETHASONE PER 1 MG: Performed by: NURSE ANESTHETIST, CERTIFIED REGISTERED

## 2025-03-17 PROCEDURE — C1894 INTRO/SHEATH, NON-LASER: HCPCS | Performed by: STUDENT IN AN ORGANIZED HEALTH CARE EDUCATION/TRAINING PROGRAM

## 2025-03-17 PROCEDURE — 25810000003 LACTATED RINGERS PER 1000 ML: Performed by: NURSE ANESTHETIST, CERTIFIED REGISTERED

## 2025-03-17 PROCEDURE — 36415 COLL VENOUS BLD VENIPUNCTURE: CPT

## 2025-03-17 PROCEDURE — C1760 CLOSURE DEV, VASC: HCPCS | Performed by: STUDENT IN AN ORGANIZED HEALTH CARE EDUCATION/TRAINING PROGRAM

## 2025-03-17 PROCEDURE — 82948 REAGENT STRIP/BLOOD GLUCOSE: CPT

## 2025-03-17 PROCEDURE — 85347 COAGULATION TIME ACTIVATED: CPT

## 2025-03-17 PROCEDURE — 85025 COMPLETE CBC W/AUTO DIFF WBC: CPT

## 2025-03-17 PROCEDURE — 25010000002 ONDANSETRON PER 1 MG: Performed by: NURSE ANESTHETIST, CERTIFIED REGISTERED

## 2025-03-17 PROCEDURE — C1893 INTRO/SHEATH, FIXED,NON-PEEL: HCPCS | Performed by: STUDENT IN AN ORGANIZED HEALTH CARE EDUCATION/TRAINING PROGRAM

## 2025-03-17 PROCEDURE — 25010000002 FENTANYL CITRATE (PF) 50 MCG/ML SOLUTION: Performed by: NURSE ANESTHETIST, CERTIFIED REGISTERED

## 2025-03-17 PROCEDURE — 25010000002 PHENYLEPHRINE 10 MG/ML SOLUTION 5 ML VIAL: Performed by: NURSE ANESTHETIST, CERTIFIED REGISTERED

## 2025-03-17 PROCEDURE — 25810000003 SODIUM CHLORIDE 0.9 % SOLUTION 250 ML FLEX CONT: Performed by: NURSE ANESTHETIST, CERTIFIED REGISTERED

## 2025-03-17 PROCEDURE — 93656 COMPRE EP EVAL ABLTJ ATR FIB: CPT | Performed by: STUDENT IN AN ORGANIZED HEALTH CARE EDUCATION/TRAINING PROGRAM

## 2025-03-17 PROCEDURE — 25010000002 LIDOCAINE 2% SOLUTION: Performed by: NURSE ANESTHETIST, CERTIFIED REGISTERED

## 2025-03-17 PROCEDURE — 25010000002 PHENYLEPHRINE 10 MG/ML SOLUTION: Performed by: NURSE ANESTHETIST, CERTIFIED REGISTERED

## 2025-03-17 PROCEDURE — 25010000002 SUGAMMADEX 200 MG/2ML SOLUTION: Performed by: NURSE ANESTHETIST, CERTIFIED REGISTERED

## 2025-03-17 PROCEDURE — 25010000002 LIDOCAINE 1% - EPINEPHRINE 1:100000 1 %-1:100000 SOLUTION: Performed by: STUDENT IN AN ORGANIZED HEALTH CARE EDUCATION/TRAINING PROGRAM

## 2025-03-17 PROCEDURE — 80053 COMPREHEN METABOLIC PANEL: CPT

## 2025-03-17 RX ORDER — ACETAMINOPHEN 325 MG/1
650 TABLET ORAL EVERY 4 HOURS PRN
Status: DISCONTINUED | OUTPATIENT
Start: 2025-03-17 | End: 2025-03-17 | Stop reason: HOSPADM

## 2025-03-17 RX ORDER — SODIUM CHLORIDE, SODIUM LACTATE, POTASSIUM CHLORIDE, CALCIUM CHLORIDE 600; 310; 30; 20 MG/100ML; MG/100ML; MG/100ML; MG/100ML
INJECTION, SOLUTION INTRAVENOUS CONTINUOUS PRN
Status: DISCONTINUED | OUTPATIENT
Start: 2025-03-17 | End: 2025-03-17 | Stop reason: SURG

## 2025-03-17 RX ORDER — FENTANYL CITRATE 50 UG/ML
50 INJECTION, SOLUTION INTRAMUSCULAR; INTRAVENOUS
Status: DISCONTINUED | OUTPATIENT
Start: 2025-03-17 | End: 2025-03-17 | Stop reason: HOSPADM

## 2025-03-17 RX ORDER — LIDOCAINE HYDROCHLORIDE 10 MG/ML
0.5 INJECTION, SOLUTION INFILTRATION; PERINEURAL ONCE AS NEEDED
Status: DISCONTINUED | OUTPATIENT
Start: 2025-03-17 | End: 2025-03-17 | Stop reason: HOSPADM

## 2025-03-17 RX ORDER — ACETAMINOPHEN 650 MG/1
650 SUPPOSITORY RECTAL EVERY 4 HOURS PRN
Status: DISCONTINUED | OUTPATIENT
Start: 2025-03-17 | End: 2025-03-17 | Stop reason: HOSPADM

## 2025-03-17 RX ORDER — SODIUM CHLORIDE 9 MG/ML
75 INJECTION, SOLUTION INTRAVENOUS CONTINUOUS
Status: ACTIVE | OUTPATIENT
Start: 2025-03-17 | End: 2025-03-17

## 2025-03-17 RX ORDER — SODIUM CHLORIDE 0.9 % (FLUSH) 0.9 %
10 SYRINGE (ML) INJECTION AS NEEDED
Status: DISCONTINUED | OUTPATIENT
Start: 2025-03-17 | End: 2025-03-17 | Stop reason: HOSPADM

## 2025-03-17 RX ORDER — NALOXONE HCL 0.4 MG/ML
0.2 VIAL (ML) INJECTION AS NEEDED
Status: DISCONTINUED | OUTPATIENT
Start: 2025-03-17 | End: 2025-03-17 | Stop reason: HOSPADM

## 2025-03-17 RX ORDER — SODIUM CHLORIDE, SODIUM LACTATE, POTASSIUM CHLORIDE, CALCIUM CHLORIDE 600; 310; 30; 20 MG/100ML; MG/100ML; MG/100ML; MG/100ML
9 INJECTION, SOLUTION INTRAVENOUS CONTINUOUS
Status: DISCONTINUED | OUTPATIENT
Start: 2025-03-17 | End: 2025-03-17 | Stop reason: HOSPADM

## 2025-03-17 RX ORDER — SODIUM CHLORIDE 0.9 % (FLUSH) 0.9 %
3 SYRINGE (ML) INJECTION EVERY 12 HOURS SCHEDULED
Status: DISCONTINUED | OUTPATIENT
Start: 2025-03-17 | End: 2025-03-17 | Stop reason: HOSPADM

## 2025-03-17 RX ORDER — ATROPINE SULFATE 0.4 MG/ML
0.4 INJECTION, SOLUTION INTRAMUSCULAR; INTRAVENOUS; SUBCUTANEOUS ONCE AS NEEDED
Status: DISCONTINUED | OUTPATIENT
Start: 2025-03-17 | End: 2025-03-17 | Stop reason: HOSPADM

## 2025-03-17 RX ORDER — ONDANSETRON 2 MG/ML
4 INJECTION INTRAMUSCULAR; INTRAVENOUS ONCE AS NEEDED
Status: DISCONTINUED | OUTPATIENT
Start: 2025-03-17 | End: 2025-03-17 | Stop reason: HOSPADM

## 2025-03-17 RX ORDER — HYDRALAZINE HYDROCHLORIDE 20 MG/ML
5 INJECTION INTRAMUSCULAR; INTRAVENOUS
Status: DISCONTINUED | OUTPATIENT
Start: 2025-03-17 | End: 2025-03-17 | Stop reason: HOSPADM

## 2025-03-17 RX ORDER — DROPERIDOL 2.5 MG/ML
0.62 INJECTION, SOLUTION INTRAMUSCULAR; INTRAVENOUS
Status: DISCONTINUED | OUTPATIENT
Start: 2025-03-17 | End: 2025-03-17 | Stop reason: HOSPADM

## 2025-03-17 RX ORDER — IPRATROPIUM BROMIDE AND ALBUTEROL SULFATE 2.5; .5 MG/3ML; MG/3ML
3 SOLUTION RESPIRATORY (INHALATION) ONCE AS NEEDED
Status: DISCONTINUED | OUTPATIENT
Start: 2025-03-17 | End: 2025-03-17 | Stop reason: HOSPADM

## 2025-03-17 RX ORDER — DIPHENHYDRAMINE HYDROCHLORIDE 50 MG/ML
12.5 INJECTION INTRAMUSCULAR; INTRAVENOUS
Status: DISCONTINUED | OUTPATIENT
Start: 2025-03-17 | End: 2025-03-17 | Stop reason: HOSPADM

## 2025-03-17 RX ORDER — ROCURONIUM BROMIDE 10 MG/ML
INJECTION, SOLUTION INTRAVENOUS AS NEEDED
Status: DISCONTINUED | OUTPATIENT
Start: 2025-03-17 | End: 2025-03-17 | Stop reason: SURG

## 2025-03-17 RX ORDER — FENTANYL CITRATE 50 UG/ML
INJECTION, SOLUTION INTRAMUSCULAR; INTRAVENOUS AS NEEDED
Status: DISCONTINUED | OUTPATIENT
Start: 2025-03-17 | End: 2025-03-17 | Stop reason: SURG

## 2025-03-17 RX ORDER — FLUMAZENIL 0.1 MG/ML
0.2 INJECTION INTRAVENOUS AS NEEDED
Status: DISCONTINUED | OUTPATIENT
Start: 2025-03-17 | End: 2025-03-17 | Stop reason: HOSPADM

## 2025-03-17 RX ORDER — SODIUM CHLORIDE 0.9 % (FLUSH) 0.9 %
3-10 SYRINGE (ML) INJECTION AS NEEDED
Status: DISCONTINUED | OUTPATIENT
Start: 2025-03-17 | End: 2025-03-17 | Stop reason: HOSPADM

## 2025-03-17 RX ORDER — PROTAMINE SULFATE 10 MG/ML
INJECTION, SOLUTION INTRAVENOUS
Status: DISCONTINUED | OUTPATIENT
Start: 2025-03-17 | End: 2025-03-17 | Stop reason: HOSPADM

## 2025-03-17 RX ORDER — PROMETHAZINE HYDROCHLORIDE 25 MG/1
25 SUPPOSITORY RECTAL ONCE AS NEEDED
Status: DISCONTINUED | OUTPATIENT
Start: 2025-03-17 | End: 2025-03-17 | Stop reason: HOSPADM

## 2025-03-17 RX ORDER — EPHEDRINE SULFATE 50 MG/ML
5 INJECTION, SOLUTION INTRAVENOUS ONCE AS NEEDED
Status: DISCONTINUED | OUTPATIENT
Start: 2025-03-17 | End: 2025-03-17 | Stop reason: HOSPADM

## 2025-03-17 RX ORDER — LIDOCAINE HYDROCHLORIDE 20 MG/ML
INJECTION, SOLUTION INFILTRATION; PERINEURAL AS NEEDED
Status: DISCONTINUED | OUTPATIENT
Start: 2025-03-17 | End: 2025-03-17 | Stop reason: SURG

## 2025-03-17 RX ORDER — ONDANSETRON 2 MG/ML
4 INJECTION INTRAMUSCULAR; INTRAVENOUS EVERY 6 HOURS PRN
Status: DISCONTINUED | OUTPATIENT
Start: 2025-03-17 | End: 2025-03-17 | Stop reason: HOSPADM

## 2025-03-17 RX ORDER — PROMETHAZINE HYDROCHLORIDE 12.5 MG/1
25 TABLET ORAL ONCE AS NEEDED
Status: DISCONTINUED | OUTPATIENT
Start: 2025-03-17 | End: 2025-03-17 | Stop reason: HOSPADM

## 2025-03-17 RX ORDER — FAMOTIDINE 10 MG/ML
20 INJECTION, SOLUTION INTRAVENOUS ONCE
Status: COMPLETED | OUTPATIENT
Start: 2025-03-17 | End: 2025-03-17

## 2025-03-17 RX ORDER — OXYCODONE AND ACETAMINOPHEN 7.5; 325 MG/1; MG/1
1 TABLET ORAL EVERY 4 HOURS PRN
Status: DISCONTINUED | OUTPATIENT
Start: 2025-03-17 | End: 2025-03-17 | Stop reason: HOSPADM

## 2025-03-17 RX ORDER — PHENYLEPHRINE HYDROCHLORIDE 10 MG/ML
INJECTION INTRAVENOUS AS NEEDED
Status: DISCONTINUED | OUTPATIENT
Start: 2025-03-17 | End: 2025-03-17 | Stop reason: SURG

## 2025-03-17 RX ORDER — HYDROMORPHONE HYDROCHLORIDE 1 MG/ML
0.5 INJECTION, SOLUTION INTRAMUSCULAR; INTRAVENOUS; SUBCUTANEOUS
Status: DISCONTINUED | OUTPATIENT
Start: 2025-03-17 | End: 2025-03-17 | Stop reason: HOSPADM

## 2025-03-17 RX ORDER — LABETALOL HYDROCHLORIDE 5 MG/ML
5 INJECTION, SOLUTION INTRAVENOUS
Status: DISCONTINUED | OUTPATIENT
Start: 2025-03-17 | End: 2025-03-17 | Stop reason: HOSPADM

## 2025-03-17 RX ORDER — ONDANSETRON 2 MG/ML
INJECTION INTRAMUSCULAR; INTRAVENOUS AS NEEDED
Status: DISCONTINUED | OUTPATIENT
Start: 2025-03-17 | End: 2025-03-17 | Stop reason: SURG

## 2025-03-17 RX ORDER — LIDOCAINE HYDROCHLORIDE AND EPINEPHRINE 10; 10 MG/ML; UG/ML
INJECTION, SOLUTION INFILTRATION; PERINEURAL
Status: DISCONTINUED | OUTPATIENT
Start: 2025-03-17 | End: 2025-03-17 | Stop reason: HOSPADM

## 2025-03-17 RX ORDER — HYDROCODONE BITARTRATE AND ACETAMINOPHEN 5; 325 MG/1; MG/1
1 TABLET ORAL ONCE AS NEEDED
Status: DISCONTINUED | OUTPATIENT
Start: 2025-03-17 | End: 2025-03-17 | Stop reason: HOSPADM

## 2025-03-17 RX ORDER — HEPARIN SODIUM 1000 [USP'U]/ML
INJECTION, SOLUTION INTRAVENOUS; SUBCUTANEOUS
Status: DISCONTINUED | OUTPATIENT
Start: 2025-03-17 | End: 2025-03-17 | Stop reason: HOSPADM

## 2025-03-17 RX ORDER — DEXAMETHASONE SODIUM PHOSPHATE 4 MG/ML
INJECTION, SOLUTION INTRA-ARTICULAR; INTRALESIONAL; INTRAMUSCULAR; INTRAVENOUS; SOFT TISSUE AS NEEDED
Status: DISCONTINUED | OUTPATIENT
Start: 2025-03-17 | End: 2025-03-17 | Stop reason: SURG

## 2025-03-17 RX ORDER — PROPOFOL 10 MG/ML
VIAL (ML) INTRAVENOUS AS NEEDED
Status: DISCONTINUED | OUTPATIENT
Start: 2025-03-17 | End: 2025-03-17 | Stop reason: SURG

## 2025-03-17 RX ORDER — MIDAZOLAM HYDROCHLORIDE 1 MG/ML
1 INJECTION, SOLUTION INTRAMUSCULAR; INTRAVENOUS
Status: DISCONTINUED | OUTPATIENT
Start: 2025-03-17 | End: 2025-03-17 | Stop reason: HOSPADM

## 2025-03-17 RX ADMIN — SODIUM CHLORIDE 75 ML/HR: 0.9 INJECTION, SOLUTION INTRAVENOUS at 11:42

## 2025-03-17 RX ADMIN — ROCURONIUM BROMIDE 100 MG: 10 INJECTION INTRAVENOUS at 13:51

## 2025-03-17 RX ADMIN — PROPOFOL 200 MG: 10 INJECTION, EMULSION INTRAVENOUS at 13:51

## 2025-03-17 RX ADMIN — LIDOCAINE HYDROCHLORIDE 100 MG: 20 INJECTION, SOLUTION INFILTRATION; PERINEURAL at 13:51

## 2025-03-17 RX ADMIN — PHENYLEPHRINE HYDROCHLORIDE 200 MCG: 10 INJECTION INTRAVENOUS at 14:22

## 2025-03-17 RX ADMIN — SUGAMMADEX 200 MG: 100 INJECTION, SOLUTION INTRAVENOUS at 15:22

## 2025-03-17 RX ADMIN — ONDANSETRON 4 MG: 2 INJECTION INTRAMUSCULAR; INTRAVENOUS at 13:59

## 2025-03-17 RX ADMIN — DEXAMETHASONE SODIUM PHOSPHATE 8 MG: 4 INJECTION, SOLUTION INTRA-ARTICULAR; INTRALESIONAL; INTRAMUSCULAR; INTRAVENOUS; SOFT TISSUE at 13:59

## 2025-03-17 RX ADMIN — FENTANYL CITRATE 50 MCG: 50 INJECTION, SOLUTION INTRAMUSCULAR; INTRAVENOUS at 14:10

## 2025-03-17 RX ADMIN — PHENYLEPHRINE HYDROCHLORIDE 0.5 MCG/KG/MIN: 10 INJECTION, SOLUTION INTRAVENOUS at 14:22

## 2025-03-17 RX ADMIN — SODIUM CHLORIDE, POTASSIUM CHLORIDE, SODIUM LACTATE AND CALCIUM CHLORIDE: 600; 310; 30; 20 INJECTION, SOLUTION INTRAVENOUS at 13:40

## 2025-03-17 RX ADMIN — FAMOTIDINE 20 MG: 10 INJECTION INTRAVENOUS at 12:52

## 2025-03-17 NOTE — PERIOPERATIVE NURSING NOTE
Dr. Segura bedside speaking with pt, aware of HR of 105. No new orders at this time. Will continue to monitor.

## 2025-03-17 NOTE — ANESTHESIA PREPROCEDURE EVALUATION
Anesthesia Evaluation     Patient summary reviewed   NPO Solid Status: > 8 hours  NPO Liquid Status: > 2 hours           Airway   Mallampati: II  TM distance: >3 FB  Neck ROM: full  Large neck circumference and Possible difficult intubation  Dental - normal exam     Pulmonary    Cardiovascular     ECG reviewed  Rhythm: irregular    (+) hypertension, dysrhythmias Atrial Fib, hyperlipidemia      Neuro/Psych  GI/Hepatic/Renal/Endo    (+) morbid obesity, diabetes mellitus type 2    Musculoskeletal     Abdominal    Substance History      OB/GYN          Other   arthritis,                 Anesthesia Plan    ASA 3     general     intravenous induction     Anesthetic plan, risks, benefits, and alternatives have been provided, discussed and informed consent has been obtained with: patient.    Use of blood products discussed with patient  Consented to blood products.      CODE STATUS:

## 2025-03-17 NOTE — ANESTHESIA PROCEDURE NOTES
Airway  Reason: elective    Date/Time: 3/17/2025 1:57 PM  Airway not difficult    General Information and Staff    Patient location during procedure: OR  CRNA/CAA: Nita Argeulles CRNA    Indications and Patient Condition  Indications for airway management: airway protection    Preoxygenated: yes    Mask difficulty assessment: 2 - vent by mask + OA or adjuvant +/- NMBA    Final Airway Details    Final airway type: endotracheal airway      Successful airway: ETT  Cuffed: yes   Successful intubation technique: direct laryngoscopy  Adjuncts used in placement: intubating stylet  Endotracheal tube insertion site: oral  Blade: Deonna  Blade size: 4  ETT size (mm): 8.0  Cormack-Lehane Classification: grade I - full view of glottis  Placement verified by: chest auscultation and capnometry   Cuff volume (mL): 8  Measured from: lips  Number of attempts at approach: 1  Assessment: lips, teeth, and gum same as pre-op and atraumatic intubation

## 2025-03-17 NOTE — H&P
History And Physical Assessment    Patient Name: Chad Davidson Jr.  Age/Sex: 64 y.o. male  : 1960  MRN: [id number]    Date of Hospital Visit: 3/17/2025  Encounter Provider: Blas Segura MD  Place of Service: [facility] CARDIOLOGY  Patient Care Team:  Eri David APRN as PCP - General (Family Medicine)  Jesus Haq OD (Optometry)  Jak Alvarez MD as Consulting Physician (Nephrology)  Yuniel Stroud MD as Consulting Physician (Otolaryngology)      Subjective:     Chad Davidson Jr. is a 64 y.o. male with a past medical history of hypertension, diabetes type 2, hyperlipidemia, atrial fibrillation (s/p cardioversion 10/30/2024) who presents for new patient evaluation.      He was recently seen by Eliane Gallardo on 2024.  At that time, it was noted that after his cardioversion, he is having fewer palpitations.     Patient presents today with family member.     Patient notes that since he has been diagnosed with atrial fibrillation, he has had episodes of fatigue weakness that were worsened when he was on metoprolol diltiazem.  He has since discontinued those medications.     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  He does work with heavy machinery, barbed wire, electric fence, tractors, cows and calves.     Notably, he notes that a few weeks ago he was all on his way to a doctor's office when he slipped and fell and hit his head.  He required transfer to the emergency room via ambulance at that time for evaluation.  CT head/spine at that time were negative.    Current Medications:   No current facility-administered medications on file prior to encounter.     Current Outpatient Medications on File Prior to Encounter   Medication Sig Dispense Refill    allopurinol (ZYLOPRIM) 300 MG tablet Take 1 tablet by mouth Daily. 90  tablet 3    amLODIPine (NORVASC) 10 MG tablet Take 1 tablet by mouth Daily. 90 tablet 2    cyclobenzaprine (FLEXERIL) 10 MG tablet Take 1 tablet by mouth three times daily as needed for muscle spasm 30 tablet 3    glimepiride (AMARYL) 4 MG tablet TAKE 1 TABLET BY MOUTH TWICE DAILY WITH MEALS 180 tablet 2    hydroCHLOROthiazide (MICROZIDE) 12.5 MG capsule Take 1 capsule by mouth once daily in the morning 90 capsule 2    sildenafil (VIAGRA) 100 MG tablet TAKE 1 TABLET BY MOUTH ONCE DAILY AS NEEDED FOR ERECTILE DYSFUNCTION 30 tablet 2       Allergies:  Allergies   Allergen Reactions    Amoxicillin Shortness Of Breath     Didn't feel well, maybe difficulty breathing    Diltiazem Shortness Of Breath    Iodine Shortness Of Breath    Contrast Dye (Echo Or Unknown Ct/Mr) Angioedema    Metoprolol Other (See Comments)     Brain fog and short term memory loss    Penicillins Unknown - High Severity     Beta lactam allergy details  Antibiotic reaction: unknown  Age at reaction: adult  Dose to reaction time: unknown  Reason for antibiotic: unknown  Epinephrine required for reaction?: unknown  Tolerated antibiotics: unknown          Objective:     Objective:  There were no vitals filed for this visit.  There is no height or weight on file to calculate BMI.    Gen: Well nourished; Alert & oriented  Skin: no visible rashes and no abnormalities with palpation  Eyes: no evidence of visual defects and normal sclera  Ears: no abnormalities.  Mouth: normal teeth and appropriately moist mucous membranes  Chest: clear to auscultation. There is normal respiratory effort and expansion.  CV: examination showed a irregular rhythm. S1 and S2 were normal.   Abd: no visible distension.   Neurologic:Cranial nerves appear intact; Sensation normal; no localizing signs      Lab Review:     Lab Results   Component Value Date    WBC 6.93 03/17/2025    HGB 17.6 03/17/2025    HCT 53.4 (H) 03/17/2025    MCV 93.7 03/17/2025     03/17/2025     Lab  "Results   Component Value Date    GLUCOSE 223 (H) 10/30/2024    BUN 16 10/30/2024    CO2 22.6 10/30/2024    CREATININE 0.77 10/30/2024    K 4.0 10/30/2024     10/30/2024     10/30/2024    CALCIUM 8.9 10/30/2024     Lab Results   Component Value Date    MG 1.8 06/14/2024     No results found for: \"PHOS\"  Lab Results   Component Value Date    ALT 36 09/13/2024    AST 21 09/13/2024    ALKPHOS 86 09/13/2024    BILITOT 0.8 09/13/2024     Lab Results   Component Value Date    TRIG 211 (H) 06/14/2024    HDL 37 (L) 06/14/2024     (H) 06/14/2024     Lab Results   Component Value Date    PTT 27.1 10/23/2015    INR 2.0 (H) 03/10/2025     Lab Results   Component Value Date    HGBA1C 11.10 (H) 09/13/2024     Lab Results   Component Value Date    TSH 3.090 06/14/2024            I reviewed the patient's new clinical results.  I personally viewed and interpreted the patient's EKG    Assessment/Plan:     # Atrial fibrillation, persistent  - On anticoagulation with warfarin  - Proceed with A-fib ablation.    Blas Segura MD  03/17/25  11:06 EDT    "

## 2025-03-17 NOTE — Clinical Note
ACT = 279 (sec). ACT was drawn at 14:55 EDT. ACT result was completed at 15:04 EDT.
ACT = 291 (sec). ACT was drawn at 14:30 EDT. ACT result was completed at 14:38 EDT.
All EP Catheters removed. 
All Patches/Pads removed. Skin Intact.
Allergies reviewed.  H&P note has been confirmed for the patient. Procedural consent has been signed.  Blood consent hs been signed. Staff has reviewed the patient's labs.
An EP study / Ablation is in progress
Catheter located in the coronary sinus.
Catheter located in the high right atrium.
Catheter located in the left atrium.
Catheter located in the left atrium.
EP Catheter inserted.
EP Catheter removed. 
Family updated
Hemostasis started on the right femoral vein. Vascade MVP was used in achieving hemostasis. Closure device deployed in the vessel and manual pressure applied to vessel. Manual pressure was held by . Manual pressure was held for 10 min. Hemostasis achieved successfully.
Mapping patches applied.
No in lab complications
Number of grounding pads used: 1  Location of grounding pads:  lower back
Physician notified by staff.
Prepped: groin. Prepped with: ChloraPrep. The site was clipped.
Replaced previous sheath in the right femoral vein.
Report was  verbally given at bedside. .
Sheath inserted into accessed vessel.
The DP pulses are +1 bilaterally. The PT pulses are +1 bilaterally. No  redness elisabeth heels. Feet elevated on pillow. Cocoon warming blanket on at 43 degrees C.Circa temp probe used.
The patient was draped in a sterile fashion.
Trans-septal procedure in progress 
with ultrasound guidance into the right femoral vein.
Yes

## 2025-03-17 NOTE — ANESTHESIA POSTPROCEDURE EVALUATION
Patient: Chad Davidson Jr.    Procedure Summary       Date: 03/17/25 Room / Location: [address] [facility]    Anesthesia Start: 1340 Anesthesia Stop: 1542    Procedures:       Ablation atrial fibrillation      3D Mapping EP Diagnosis:       Persistent atrial fibrillation      (atrial fibrillation)    Providers: Blas Segura MD Provider: Reginaldo Mccarthy MD    Anesthesia Type: general ASA Status: 3            Anesthesia Type: general    Vitals  Vitals Value Taken Time   /77 03/17/25 17:30   Temp 36.7 °C (98.1 °F) 03/17/25 15:36   Pulse 84 03/17/25 17:31   Resp 16 03/17/25 16:00   SpO2 98 % 03/17/25 17:31   Vitals shown include unfiled device data.        Anesthesia Post Evaluation

## 2025-03-17 NOTE — DISCHARGE INSTRUCTIONS
[facility]  [address] [place] [address]    Dr. Segura's Discharge Instructions for Atrial Fibrillation or Atrial Flutter     Refer to this sheet in the next few weeks. These instructions provide you with information on caring for yourself after your procedure.     Make arrangements to have someone stay with you for 24 hours following the procedure.  This is due to the sedation you received and for your safety in the event of any complications.      About your Procedure:  You have had a procedure called a catheter ablation.  It is used to treat abnormal heartbeats (arrhythmia). The procedure destroyed (ablated) the cells in your heart that were causing your heart rhythm problem. During the procedure, the healthcare provider placed a thin, flexible wire (catheter) into a blood vessel in your groin.  The provider then threaded the catheter to your heart and destroyed the problematic cells.      Goal of Procedure:  The goal of this procedure is to regain a normal heart rhythm (sinus rhythm) and reduce the symptoms associated with your irregular heart rhythm. In many cases, one ablation is enough to treat the arrythmia, but sometimes the problem returns, and a second ablation may be necessary.      What to Expect After the Procedure:  After your procedure, it is typical to have the following sensations:  Minor discomfort or soreness in the chest or a small bump at the insertion site (groin). The bump should usually decrease in size and tenderness within 1 to 2 weeks.  Mild bruising which will usually fade within 2 to 4 weeks.  A mild sore throat  Sometimes the irregular heartbeat goes away immediately after the procedure.  Other times, it may take longer.  As your heart heals, it is common to have some Atrial Fibrillation symptoms and you may even go into atrial fibrillation up to a few months after the procedure.    Do not miss a dose of your blood thinner (Apixaban/Eliquis, rivaroxaban/Xarelto,  Warfarin/Coumadin).   You will have a follow up appointment in approximately 1 month.      Home Care Instructions:  Take your medications exactly as directed.  Do not skip doses unless directed to do so by your healthcare provider.   You should be able to return to most of your normal activities in the next 1-2 days. These include walking, climbing stairs, and doing household chores.   You may remove the dressings in your groin in 24 hours.    You may then shower and gently wash the area with plain soap and water after removing the dressings.     Do not apply powder or lotion to the site.  Do not take baths, swim, or use a hot tub until your health care provider approves and the site is completely healed.  Hold direct pressure to your groin sites any time you laugh, cough, sneeze or change positions.  Do this for the next 48 hours.   Do not bend, squat, or lift anything over 20 lb. (9 kg) for 7 days after your ablation. Do not do any other heavy physical activity for 7 days.  This allows your body time to heal properly.    Inspect the groin sites daily for signs of infection for 7 days. Those signs include: redness, swelling, drainage, or warmth at the groin sites.     Follow instructions about when you can drive or return to work as directed by your physician.    If you experience bleeding or swelling (larger than the size of a golf ball) at the groin sites after you go home, do not remove the dressing. Lie down flat and hold pressure over the sites for at last 10-15 minutes. You or the person with you should call the office at [phone] for further instructions. If the bleeding does not stop after 10-15 minutes, call [phone] and continue holding pressure.  You need to come to the emergency room for evaluation.     Call Your Doctor If:  You experience the symptoms you had before the procedure for more than 24 hours. You have drainage (other than a small amount of blood on the dressing).  You have chills or a fever >  101.  You have redness, warmth, swelling (larger than a walnut), drainage or severe pain at the insertion site  You develop chest pain or shortness of breath, feel faint, or pass out.  You develop pain, discoloration, coldness, numbness, tingling, or severe bruising in the leg that held the catheter.  You develop bleeding from any other place, such as the bowels.  You have difficulty swallowing, excessive pain when swallowing, difficulty breathing or vomiting of blood  You have unstable vital signs such as blood pressure less than 90/60 or a heart rate greater than 130 beats per minute for more than 1 hour.   You have any symptoms of a stroke.  Remember BE FAST  B-balance. Sudden trouble walking or loss of balance.  E-eyes.  Sudden changes in how you see or a sudden onset of a very bad headache.   F-face. Sudden weakness or loss of feeling of the face or facial droop on one side.   A-arms Sudden weakness or numbness in one arm.  One arm drifts down if they are both held out in front of you. This happens suddenly and usually on one side of the body.  S-speech.  Sudden trouble speaking, slurred speech or trouble understanding what people are saying.   T-time  Time to call emergency services.  Write down the symptoms and the time they started.

## 2025-03-17 NOTE — TELEPHONE ENCOUNTER
Problem: At Risk for Falls  Goal: Patient does not fall  Outcome: Monitoring/Evaluating progress  Goal: Patient takes action to control fall-related risks  Outcome: Monitoring/Evaluating progress      LOV                   9/13/2024  NOV                   12/3/2024  Last refill             6/14/24  Protocol              met

## 2025-03-18 LAB
QT INTERVAL: 348 MS
QTC INTERVAL: 405 MS

## 2025-03-21 ENCOUNTER — TELEPHONE (OUTPATIENT)
Dept: CARDIOLOGY | Facility: CLINIC | Age: 65
End: 2025-03-21
Payer: MEDICARE

## 2025-03-21 NOTE — TELEPHONE ENCOUNTER
Caller: Chad Davidson Jr.    Relationship to patient: Self    Best call back number: [phone]    Chief complaint: NA    Type of visit: HOSPITAL FU    Requested date: 04.17.25     If rescheduling, when is the original appointment: NA     Additional notes:PER DISCHARGE NOTES PATIENT IS TO BE SEEN IN ONE MONTH ON 04.17.25 PLEASE CALL AND ADVISE NO AVAILABLE APPOINTMENTS.

## 2025-03-24 ENCOUNTER — ANTICOAGULATION VISIT (OUTPATIENT)
Dept: PHARMACY | Facility: HOSPITAL | Age: 65
End: 2025-03-24
Payer: MEDICARE

## 2025-03-24 DIAGNOSIS — I48.19 ATRIAL FIBRILLATION, PERSISTENT: Primary | ICD-10-CM

## 2025-03-24 LAB
INR PPP: 2.4 (ref 0.91–1.09)
PROTHROMBIN TIME: 28.4 SECONDS (ref 10–13.8)

## 2025-03-24 PROCEDURE — 85610 PROTHROMBIN TIME: CPT

## 2025-03-24 PROCEDURE — G0463 HOSPITAL OUTPT CLINIC VISIT: HCPCS

## 2025-03-24 NOTE — PROGRESS NOTES
Anticoagulation Clinic Progress Note    Anticoagulation Summary  As of 3/24/2025      INR goal:  2.0-3.0   TTR:  47.2% (7.4 mo)   INR used for dosin.4 (3/24/2025)   Warfarin maintenance plan:  5 mg every Tue, Sat; 10 mg all other days   Weekly warfarin total:  60 mg   No change documented:  Adria Yan, Pharmacy Intern   Plan last modified:  Lauri Burdick, PharmD (3/3/2025)   Next INR check:  2025   Target end date:  --    Indications    Atrial fibrillation  persistent [I48.19]                 Anticoagulation Episode Summary       INR check location:  --    Preferred lab:  --    Send INR reminders to:  [facility] [place]    Comments:  Lives [address] ([place], [place]).           Anticoagulation Care Providers       Provider Role Specialty Phone number    Nagi Chavez Jr., MD Referring Cardiology [phone]            Clinic Interview:  Patient Findings     Negatives:  Signs/symptoms of thrombosis, Signs/symptoms of bleeding,   Laboratory test error suspected, Change in health, Change in alcohol use,   Change in activity, Upcoming invasive procedure, Emergency department   visit, Upcoming dental procedure, Missed doses, Extra doses, Change in   medications, Change in diet/appetite, Hospital admission, Bruising, Other   complaints      Clinical Outcomes     Negatives:  Major bleeding event, Thromboembolic event,   Anticoagulation-related hospital admission, Anticoagulation-related ED   visit, Anticoagulation-related fatality        INR History:      2024    10:00 AM 2025    10:00 AM 2/10/2025     8:30 AM 2025     8:00 AM 3/3/2025     8:30 AM 3/10/2025     8:15 AM 3/24/2025     8:15 AM   Anticoagulation Monitoring   INR 2.5 3.3 1.6 2.3 1.8 2.0 2.4   INR Date 2024 2025 2/10/2025 2025 3/3/2025 3/10/2025 3/24/2025   INR Goal 2.0-3.0 2.0-3.0 2.0-3.0 2.0-3.0 2.0-3.0 2.0-3.0 2.0-3.0   Trend Same Same Same Same Up Same Same   Last Week Total 55 mg 55 mg 50 mg  55 mg 55 mg 55 mg 60 mg   Next Week Total 55 mg 52.5 mg 60 mg 55 mg 65 mg 75 mg 60 mg   Sun 10 mg 10 mg 10 mg 10 mg 10 mg 10 mg 10 mg   Mon 10 mg 10 mg 15 mg (2/10); Otherwise 10 mg 10 mg 15 mg (3/3) 20 mg (3/10); Otherwise 10 mg 10 mg   Tue 5 mg 5 mg 5 mg 5 mg 5 mg 5 mg 5 mg   Wed 10 mg 10 mg 10 mg 10 mg 10 mg 10 mg 10 mg   Thu 5 mg 5 mg 5 mg 5 mg 10 mg 10 mg 10 mg   Fri 10 mg 7.5 mg (1/17); Otherwise 10 mg 10 mg 10 mg 10 mg 10 mg 10 mg   Sat 5 mg 5 mg 5 mg 5 mg 5 mg 10 mg (3/15); Otherwise 5 mg 5 mg   Visit Report  Report            Plan:  1. INR is Therapeutic today- see above in Anticoagulation Summary.  Will instruct Chad Davidson Jr. to Continue their warfarin regimen- see above in Anticoagulation Summary.  2. Follow up in 1 week suggested, patient unable to return until 4/4/2025.  3. Patient declines warfarin refills.  4. Verbal and written information provided. Patient expresses understanding and has no further questions at this time.    Rhonda [place], Pharmacy Intern

## 2025-03-25 NOTE — TELEPHONE ENCOUNTER
I called and left a voicemail for patient about scheduling 1 month hospital follow up post ablation.

## 2025-04-02 DIAGNOSIS — I10 PRIMARY HYPERTENSION: ICD-10-CM

## 2025-04-02 RX ORDER — AMLODIPINE BESYLATE 10 MG/1
10 TABLET ORAL DAILY
Qty: 30 TABLET | Refills: 0 | Status: SHIPPED | OUTPATIENT
Start: 2025-04-02

## 2025-04-07 ENCOUNTER — ANTICOAGULATION VISIT (OUTPATIENT)
Dept: PHARMACY | Facility: HOSPITAL | Age: 65
End: 2025-04-07
Payer: MEDICARE

## 2025-04-07 DIAGNOSIS — I48.19 ATRIAL FIBRILLATION, PERSISTENT: Primary | ICD-10-CM

## 2025-04-07 LAB
INR PPP: 2.8 (ref 0.91–1.09)
PROTHROMBIN TIME: 33.4 SECONDS (ref 10–13.8)

## 2025-04-07 PROCEDURE — 85610 PROTHROMBIN TIME: CPT

## 2025-04-07 PROCEDURE — G0463 HOSPITAL OUTPT CLINIC VISIT: HCPCS

## 2025-04-07 RX ORDER — WARFARIN SODIUM 5 MG/1
TABLET ORAL
Qty: 155 TABLET | Refills: 0 | Status: SHIPPED | OUTPATIENT
Start: 2025-04-07

## 2025-04-07 NOTE — PROGRESS NOTES
Anticoagulation Clinic Progress Note    Anticoagulation Summary  As of 2025      INR goal:  2.0-3.0   TTR:  50.4% (7.8 mo)   INR used for dosin.8 (2025)   Warfarin maintenance plan:  5 mg every e, Sat; 10 mg all other days   Weekly warfarin total:  60 mg   No change documented:  Tamara Harris RPH   Plan last modified:  Lauri Burdick, PharmD (3/3/2025)   Next INR check:  2025   Target end date:  --    Indications    Atrial fibrillation  persistent [I48.19]                 Anticoagulation Episode Summary       INR check location:  --    Preferred lab:  --    [facility]   [facility] [place]    Comments:  [address] ([place], [place]).           Anticoagulation Care Providers       Provider Role Specialty Phone number    Nagi Chavez Jr., MD Referring Cardiology [phone]            Clinic Interview:  Patient Findings     Positives:  Change in diet/appetite    Negatives:  Signs/symptoms of thrombosis, Signs/symptoms of bleeding,   Laboratory test error suspected, Change in health, Change in alcohol use,   Change in activity, Upcoming invasive procedure, Emergency department   visit, Upcoming dental procedure, Missed doses, Extra doses, Change in   medications, Hospital admission, Bruising, Other complaints    Comments:  Pt reported eating broccoli a couple days ago      Clinical Outcomes     Negatives:  Major bleeding event, Thromboembolic event,   Anticoagulation-related hospital admission, Anticoagulation-related ED   visit, Anticoagulation-related fatality    Comments:  Pt reported eating broccoli a couple days ago        INR History:      2025    10:00 AM 2/10/2025     8:30 AM 2025     8:00 AM 3/3/2025     8:30 AM 3/10/2025     8:15 AM 3/24/2025     8:15 AM 2025     2:45 PM   Anticoagulation Monitoring   INR 3.3 1.6 2.3 1.8 2.0 2.4 2.8   INR Date 2025 2/10/2025 2025 3/3/2025 3/10/2025 3/24/2025 2025   INR Goal 2.0-3.0 2.0-3.0 2.0-3.0  2.0-3.0 2.0-3.0 2.0-3.0 2.0-3.0   Trend Same Same Same Up Same Same Same   Last Week Total 55 mg 50 mg 55 mg 55 mg 55 mg 60 mg 60 mg   Next Week Total 52.5 mg 60 mg 55 mg 65 mg 75 mg 60 mg 60 mg   Sun 10 mg 10 mg 10 mg 10 mg 10 mg 10 mg 10 mg   Mon 10 mg 15 mg (2/10); Otherwise 10 mg 10 mg 15 mg (3/3) 20 mg (3/10); Otherwise 10 mg 10 mg 10 mg   Tue 5 mg 5 mg 5 mg 5 mg 5 mg 5 mg 5 mg   Wed 10 mg 10 mg 10 mg 10 mg 10 mg 10 mg 10 mg   Thu 5 mg 5 mg 5 mg 10 mg 10 mg 10 mg 10 mg   Fri 7.5 mg (1/17); Otherwise 10 mg 10 mg 10 mg 10 mg 10 mg 10 mg 10 mg   Sat 5 mg 5 mg 5 mg 5 mg 10 mg (3/15); Otherwise 5 mg 5 mg 5 mg   Visit Report Report             Plan:  1. INR is therapeutic today- see above in Anticoagulation Summary.   Will instruct Chad Davidson Jr. to continue their warfarin regimen- see above in Anticoagulation Summary.  2. Follow up in 1 week.  3. Patient desires warfarin refills.  4. Verbal and written information provided. Patient expresses understanding and has no further questions at this time.    Martina Townsend, Pharmacy Intern

## 2025-04-07 NOTE — PROGRESS NOTES
I have supervised and reviewed the notes, assessments, and/or procedures performed. The documented assessment and plan were developed cooperatively, and the plan was implemented in my presence. I concur with the documentation of this patient encounter.    Tamara Harris, [facility]

## 2025-04-15 ENCOUNTER — ANTICOAGULATION VISIT (OUTPATIENT)
Dept: PHARMACY | Facility: HOSPITAL | Age: 65
End: 2025-04-15
Payer: MEDICARE

## 2025-04-15 DIAGNOSIS — I48.19 ATRIAL FIBRILLATION, PERSISTENT: Primary | ICD-10-CM

## 2025-04-15 LAB
INR PPP: 3.2 (ref 0.91–1.09)
PROTHROMBIN TIME: 37.8 SECONDS (ref 10–13.8)

## 2025-04-15 PROCEDURE — G0463 HOSPITAL OUTPT CLINIC VISIT: HCPCS

## 2025-04-15 PROCEDURE — 85610 PROTHROMBIN TIME: CPT

## 2025-04-15 NOTE — PROGRESS NOTES
I have supervised and reviewed the notes, assessments, and/or procedures performed. The documented assessment and plan were developed cooperatively, and the plan was implemented in my presence. I concur with the documentation of this patient encounter.    Tamara Harris, [facility]

## 2025-04-15 NOTE — PROGRESS NOTES
Anticoagulation Clinic Progress Note    Anticoagulation Summary  As of 4/15/2025      INR goal:  2.0-3.0   TTR:  50.4% (8.1 mo)   INR used for dosing:  3.2 (4/15/2025)   Warfarin maintenance plan:  5 mg every Tue, Sat; 10 mg all other days   Weekly warfarin total:  60 mg   No change documented:  Martina Townsend, Pharmacy Intern   Plan last modified:  Lauri Burdick, PharmD (3/3/2025)   Next INR check:  4/28/2025   Target end date:  --    Indications    Atrial fibrillation  persistent [I48.19]                 Anticoagulation Episode Summary       INR check location:  --    Preferred lab:  --    Send INR reminders to:   [facility]    Comments:  [address]ives [address] ([place], [place]).           Anticoagulation Care Providers       Provider Role Specialty Phone number    Nagi Chavez Jr., MD Referring Cardiology [phone]            Clinic Interview:  Patient Findings     Positives:  Missed doses, Extra doses    Negatives:  Signs/symptoms of thrombosis, Signs/symptoms of bleeding,   Laboratory test error suspected, Change in health, Change in alcohol use,   Change in activity, Upcoming invasive procedure, Emergency department   visit, Upcoming dental procedure, Change in medications, Change in   diet/appetite, Hospital admission, Bruising, Other complaints    Comments:  Pt missed dose last Monday and then took 15 mg total on   Tuesday      Clinical Outcomes     Negatives:  Major bleeding event, Thromboembolic event,   Anticoagulation-related hospital admission, Anticoagulation-related ED   visit, Anticoagulation-related fatality    Comments:  Pt missed dose last Monday and then took 15 mg total on   Tuesday        INR History:      2/10/2025     8:30 AM 2/24/2025     8:00 AM 3/3/2025     8:30 AM 3/10/2025     8:15 AM 3/24/2025     8:15 AM 4/7/2025     2:45 PM 4/15/2025     2:45 PM   Anticoagulation Monitoring   INR 1.6 2.3 1.8 2.0 2.4 2.8 3.2   INR Date 2/10/2025 2/24/2025 3/3/2025 3/10/2025 3/24/2025  4/7/2025 4/15/2025   INR Goal 2.0-3.0 2.0-3.0 2.0-3.0 2.0-3.0 2.0-3.0 2.0-3.0 2.0-3.0   Trend Same Same Up Same Same Same Same   Last Week Total 50 mg 55 mg 55 mg 55 mg 60 mg 60 mg 60 mg   Next Week Total 60 mg 55 mg 65 mg 75 mg 60 mg 60 mg 60 mg   Sun 10 mg 10 mg 10 mg 10 mg 10 mg 10 mg 10 mg   Mon 15 mg (2/10); Otherwise 10 mg 10 mg 15 mg (3/3) 20 mg (3/10); Otherwise 10 mg 10 mg 10 mg 10 mg   Tue 5 mg 5 mg 5 mg 5 mg 5 mg 5 mg 5 mg   Wed 10 mg 10 mg 10 mg 10 mg 10 mg 10 mg 10 mg   Thu 5 mg 5 mg 10 mg 10 mg 10 mg 10 mg 10 mg   Fri 10 mg 10 mg 10 mg 10 mg 10 mg 10 mg 10 mg   Sat 5 mg 5 mg 5 mg 10 mg (3/15); Otherwise 5 mg 5 mg 5 mg 5 mg       Plan:  1. INR is Supratherapeutic today- see above in Anticoagulation Summary.   Will instruct Chad Davidson Jr. to Continue their warfarin regimen- see above in Anticoagulation Summary.  2. Follow up in 2 weeks  3. They have been instructed to call if any changes in medications, doses, concerns, etc. Patient expresses understanding and has no further questions at this time.    Martina Townsend, Pharmacy Intern

## 2025-04-16 NOTE — PROGRESS NOTES
[facility] CARDIOLOGY  Clinical Cardiac Electrophysiology     Date of consultation: 4/18/2025  Referring Provider: No referring provider defined for this encounter.     Reason for consultation: Persistent atrial fibrillation      History of Presenting Illness     OFFICE VISIT  Chad Davidson Jr. is a 64 y.o. male with a past medical history of hypertension, diabetes type 2, hyperlipidemia, atrial fibrillation (s/p cardioversion 10/30/2024) s/p PVI, left veronica line on 3/17/2025 who presents for new patient evaluation.     He was recently seen by Eliane Gallardo on 11/18/2024.  At that time, it was noted that after his cardioversion, he is having fewer palpitations.     He was initially seen by me on 2/7/2025 at that time the following is noted:    Patient notes that since he has been diagnosed with atrial fibrillation, he has had episodes of fatigue weakness that were worsened when he was on metoprolol diltiazem.  He has since discontinued those medications.    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  He does work with heavy machinery, barbed wire, electric fence, tractors, cows and calves.    Notably, he notes that a few weeks ago he was all on his way to a doctor's office when he slipped and fell and hit his head.  He required transfer to the emergency room via ambulance at that time for evaluation.  CT head/spine at that time were negative.    Seen today on 4/18/2025.  At that time he presents with his wife.  Notes he is doing very well.  He got a Presstler device and he has not had sinus rhythm ever since.  No further episodes of atrial fibrillation that he knows of.  No symptoms from A-fib either.     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    Arthritis     Mihir foote  spider bite     with necrotizing fasciitis    COVID-19 virus infection 11/2021    Diabetes mellitus     Erectile dysfunction     Hypertension     Low back pain     Parotitis 06/24/2019     Past Surgical History:   Procedure Laterality Date    BACK SURGERY      CARDIAC ELECTROPHYSIOLOGY PROCEDURE  3/17/2025    Procedure: Ablation atrial fibrillation;  Surgeon: Blas Segura MD;  Location: [facility] INVASIVE LOCATION;  Service: Cardiovascular;;    COLONOSCOPY  approx 2010    2 polyps removed    JOINT REPLACEMENT      REPAIR PATELLAR TENDON Right 2016    REPLACEMENT TOTAL KNEE Bilateral 2015         Medications     Current Outpatient Medications on File Prior to Visit   Medication Sig Dispense Refill    allopurinol (ZYLOPRIM) 300 MG tablet Take 1 tablet by mouth Daily. 90 tablet 3    amLODIPine (NORVASC) 10 MG tablet Take 1 tablet by mouth once daily 30 tablet 0    benazepril (LOTENSIN) 20 MG tablet Take 1 tablet by mouth once daily 90 tablet 0    cyclobenzaprine (FLEXERIL) 10 MG tablet Take 1 tablet by mouth three times daily as needed for muscle spasm 30 tablet 3    glimepiride (AMARYL) 4 MG tablet TAKE 1 TABLET BY MOUTH TWICE DAILY WITH MEALS 180 tablet 2    hydroCHLOROthiazide (MICROZIDE) 12.5 MG capsule Take 1 capsule by mouth once daily in the morning 90 capsule 2    metFORMIN (GLUCOPHAGE) 500 MG tablet TAKE 2 TABLETS BY MOUTH TWICE DAILY WITH MEALS 360 tablet 0    sildenafil (VIAGRA) 100 MG tablet TAKE 1 TABLET BY MOUTH ONCE DAILY AS NEEDED FOR ERECTILE DYSFUNCTION 30 tablet 2    warfarin (COUMADIN) 5 MG tablet TAKE ONE TABLET BY MOUTH ON TUES, SAT AND TAKE TWO TABLETS BY MOUTH ALL OTHER DAYS OR AS DIRECTED 155 tablet 0     No current facility-administered medications on file prior to visit.           Allergies     Allergies   Allergen Reactions    Amoxicillin Shortness Of Breath     Didn't feel well, maybe difficulty breathing    Diltiazem Shortness Of Breath    Iodine Shortness Of Breath    Contrast Dye (Echo  "Or Unknown Ct/Mr) Angioedema    Metoprolol Other (See Comments)     Brain fog and short term memory loss    Penicillins Unknown - High Severity     Beta lactam allergy details  Antibiotic reaction: unknown  Age at reaction: adult  Dose to reaction time: unknown  Reason for antibiotic: unknown  Epinephrine required for reaction?: unknown  Tolerated antibiotics: unknown              Family History     Family History   Problem Relation Age of Onset    Diabetes Father     Alcohol abuse Father     Hepatitis Father     Diabetes Sister     Hypertension Sister            Social History     Social History     Socioeconomic History    Marital status:    Tobacco Use    Smoking status: Former     Current packs/day: 0.00     Average packs/day: 2.5 packs/day for 20.0 years (50.0 ttl pk-yrs)     Types: Cigarettes     Start date: 1975     Quit date: 1995     Years since quittin.3     Passive exposure: Past    Smokeless tobacco: Never   Vaping Use    Vaping status: Never Used   Substance and Sexual Activity    Alcohol use: Not Currently     Comment: no ETOH in last 8 years    Drug use: Never    Sexual activity: Yes     Partners: Female        Review of Systems: All systems reviewed. Pertinent positives identified in HPI. All other systems are negative.         Physical Examination     Vitals:    25 1309   BP: 124/74   BP Location: Right arm   Patient Position: Sitting   Pulse: 94   Weight: 125 kg (275 lb)   Height: 185.4 cm (73\")       Body mass index is 36.28 kg/m².     Gen: Well nourished; Alert  Skin: no visible rashes and no abnormalities with palpation  Eyes: no evidence of visual defects and normal sclera  Ears: no abnormalities.  Mouth: normal teeth and appropriately moist mucous membranes  Chest: clear to auscultation. There is normal respiratory effort and expansion.  CV: examination showed a fast irregular rhythm. S1 and S2 were normal.   Abd: no visible distension.   Neurologic:Cranial nerves " "appear intact; Sensation normal; no localizing signs        Laboratory Studies (Reviewed by provider)    Lab Results   Component Value Date    WBC 6.93 2025    HGB 17.6 2025    HCT 53.4 (H) 2025    MCV 93.7 2025     2025     Lab Results   Component Value Date    GLUCOSE 275 (H) 2025    BUN 17 2025    CO2 22.0 2025    CREATININE 0.89 2025    K 5.3 (H) 2025     2025     2025    CALCIUM 9.2 2025     Lab Results   Component Value Date    MG 1.8 2024     No results found for: \"PHOS\"  Lab Results   Component Value Date    ALT 22 2025    AST 22 2025    ALKPHOS 84 2025    BILITOT 0.6 2025     Lab Results   Component Value Date    TRIG 211 (H) 2024    HDL 37 (L) 2024     (H) 2024     Lab Results   Component Value Date    PTT 27.1 10/23/2015    INR 3.2 (H) 04/15/2025     Lab Results   Component Value Date    HGBA1C 11.10 (H) 2024     Lab Results   Component Value Date    TSH 3.090 2024        Investigations (Reviewed by provider)    EKG 25:   ECG 12 Lead    Date/Time: 2025 1:13 PM  Performed by: Blas Segura MD    Authorized by: Blas Segura MD  Rhythm: sinus rhythm  QRS axis: right          2024:       ECHOCARDIOGRAPHY: 2024:    Left ventricular systolic function is normal. Left ventricular ejection fraction appears to be 56 - 60%.    Left ventricular diastolic function was indeterminate.    There is moderate calcification of the aortic valve without any significant stenosis or regurgitation noted..        HOLTER/EVENT MONITORIN2024:    An abnormal monitor study.    48-hour Holter monitor continuous atrial fibrillation heart rate ranging 71 to 160 bpm (average 102 bpm).  Rapid ventricular response was noted 41% of the monitored time. Rare this ventricular ectopy with 1 episode of NSVT lasting 3 beats. No significant bradycardia or " pauses. No patient triggered or diary events reported.           Assessment & Plan:    # Atrial fibrillation,   - s/p PVI, left veronica line on 3/17/2025   - CHADVASC: [id number] Score: 2  - Anticoagulation with: Warfarin  - Doing very well since ablation, no further episodes    # Fall from standing  # Concern for striking his head  - Occurred on 1/17/2025.  - He required ambulance transport to the emergency room and had to receive CT scan of his head/neck.  Fortunately no injuries occurred at that time.  - In the setting, he is a poor candidate for long-term coagulation.  - I did previously discuss with him risk/benefit for watchman implant.  - Discussed the risks/benefits of elective Watchman Implantation, including approximately 1% risk of heart attack, stroke, injury to the heart/lungs/esophagus/blood vessels. Described that the risk of severe injury/illness/death due to the procedure is very small.  -Patient previously deferred     Follow up PRN  Plan: Patient doing well.  Continue current management    As always,  No ref. provider found, it has been a pleasure to participate in your patient's care.       This time spent caring for Chad Davidson Jr. on this date of service includes time spent by me in the following activities:preparing for the visit, reviewing tests, obtaining and/or reviewing a separately obtained history, performing a medically appropriate examination and/or evaluation, counseling and educating the patient/family/caregiver, documenting information in the medical record, and independently interpreting results and communicating that information with the patient/family/caregiver    Blas Segura MD  Clinical Cardiac Electrophysiology  [facility]

## 2025-04-18 ENCOUNTER — OFFICE VISIT (OUTPATIENT)
Age: 65
End: 2025-04-18
Payer: MEDICARE

## 2025-04-18 VITALS
BODY MASS INDEX: 36.45 KG/M2 | WEIGHT: 275 LBS | SYSTOLIC BLOOD PRESSURE: 124 MMHG | DIASTOLIC BLOOD PRESSURE: 74 MMHG | HEIGHT: 73 IN | HEART RATE: 94 BPM

## 2025-04-18 DIAGNOSIS — I48.19 PERSISTENT ATRIAL FIBRILLATION: Primary | ICD-10-CM

## 2025-04-18 PROCEDURE — 3078F DIAST BP <80 MM HG: CPT | Performed by: STUDENT IN AN ORGANIZED HEALTH CARE EDUCATION/TRAINING PROGRAM

## 2025-04-18 PROCEDURE — 99214 OFFICE O/P EST MOD 30 MIN: CPT | Performed by: STUDENT IN AN ORGANIZED HEALTH CARE EDUCATION/TRAINING PROGRAM

## 2025-04-18 PROCEDURE — 93000 ELECTROCARDIOGRAM COMPLETE: CPT | Performed by: STUDENT IN AN ORGANIZED HEALTH CARE EDUCATION/TRAINING PROGRAM

## 2025-04-18 PROCEDURE — 3074F SYST BP LT 130 MM HG: CPT | Performed by: STUDENT IN AN ORGANIZED HEALTH CARE EDUCATION/TRAINING PROGRAM

## 2025-04-28 ENCOUNTER — APPOINTMENT (OUTPATIENT)
Dept: PHARMACY | Facility: HOSPITAL | Age: 65
End: 2025-04-28
Payer: MEDICARE

## 2025-05-03 DIAGNOSIS — I10 PRIMARY HYPERTENSION: ICD-10-CM

## 2025-05-05 RX ORDER — AMLODIPINE BESYLATE 10 MG/1
10 TABLET ORAL DAILY
Qty: 30 TABLET | Refills: 0 | Status: SHIPPED | OUTPATIENT
Start: 2025-05-05

## 2025-05-06 ENCOUNTER — TELEPHONE (OUTPATIENT)
Dept: FAMILY MEDICINE CLINIC | Facility: CLINIC | Age: 65
End: 2025-05-06
Payer: MEDICARE

## 2025-05-06 ENCOUNTER — ANTICOAGULATION VISIT (OUTPATIENT)
Dept: PHARMACY | Facility: HOSPITAL | Age: 65
End: 2025-05-06
Payer: MEDICARE

## 2025-05-06 DIAGNOSIS — I48.19 ATRIAL FIBRILLATION, PERSISTENT: Primary | ICD-10-CM

## 2025-05-06 LAB
INR PPP: 2.7 (ref 0.91–1.09)
PROTHROMBIN TIME: 31.9 SECONDS (ref 10–13.8)

## 2025-05-06 PROCEDURE — G0463 HOSPITAL OUTPT CLINIC VISIT: HCPCS

## 2025-05-06 PROCEDURE — 85610 PROTHROMBIN TIME: CPT

## 2025-05-06 NOTE — PROGRESS NOTES
Anticoagulation Clinic Progress Note    Anticoagulation Summary  As of 2025      INR goal:  2.0-3.0   TTR:  51.1% (8.8 mo)   INR used for dosin.7 (2025)   Warfarin maintenance plan:  5 mg every Tue, Sat; 10 mg all other days   Weekly warfarin total:  60 mg   No change documented:  Jayme Anthony, Pharmacy Technician   Plan last modified:  Lauri Burdick, PharmD (3/3/2025)   Next INR check:  2025   Target end date:  --    Indications    Atrial fibrillation  persistent [I48.19]                 Anticoagulation Episode Summary       INR check location:  --    Preferred lab:  --    [facility] [place]    Comments:  Lives [address] ([place], [place]).           Anticoagulation Care Providers       Provider Role Specialty Phone number    Nagi Chavez Jr., MD Referring Cardiology [phone]            Clinic Interview:  Patient Findings     Negatives:  Signs/symptoms of thrombosis, Signs/symptoms of bleeding,   Laboratory test error suspected, Change in health, Change in alcohol use,   Change in activity, Upcoming invasive procedure, Emergency department   visit, Upcoming dental procedure, Missed doses, Extra doses, Change in   medications, Change in diet/appetite, Hospital admission, Bruising, Other   complaints      Clinical Outcomes     Negatives:  Major bleeding event, Thromboembolic event,   Anticoagulation-related hospital admission, Anticoagulation-related ED   visit, Anticoagulation-related fatality        INR History:      2025     8:00 AM 3/3/2025     8:30 AM 3/10/2025     8:15 AM 3/24/2025     8:15 AM 2025     2:45 PM 4/15/2025     2:45 PM 2025     3:15 PM   Anticoagulation Monitoring   INR 2.3 1.8 2.0 2.4 2.8 3.2 2.7   INR Date 2025 3/3/2025 3/10/2025 3/24/2025 2025 4/15/2025 2025   INR Goal 2.0-3.0 2.0-3.0 2.0-3.0 2.0-3.0 2.0-3.0 2.0-3.0 2.0-3.0   Trend Same Up Same Same Same Same Same   Last Week Total 55 mg 55 mg 55 mg 60 mg 60 mg  60 mg 60 mg   Next Week Total 55 mg 65 mg 75 mg 60 mg 60 mg 60 mg 60 mg   Sun 10 mg 10 mg 10 mg 10 mg 10 mg 10 mg 10 mg   Mon 10 mg 15 mg (3/3) 20 mg (3/10); Otherwise 10 mg 10 mg 10 mg 10 mg 10 mg   Tue 5 mg 5 mg 5 mg 5 mg 5 mg 5 mg 5 mg   Wed 10 mg 10 mg 10 mg 10 mg 10 mg 10 mg 10 mg   Thu 5 mg 10 mg 10 mg 10 mg 10 mg 10 mg 10 mg   Fri 10 mg 10 mg 10 mg 10 mg 10 mg 10 mg 10 mg   Sat 5 mg 5 mg 10 mg (3/15); Otherwise 5 mg 5 mg 5 mg 5 mg 5 mg       Plan:  1. INR is therapeutic today- see above in Anticoagulation Summary.   Will instruct Chad Davidson Jr. to continue their warfarin regimen- see above in Anticoagulation Summary.  2. Follow up in 4 weeks.  3. Patient declines warfarin refills.  4. Verbal and written information provided. Patient expresses understanding and has no further questions at this time.    Jayme Anthony, Pharmacy Technician

## 2025-05-06 NOTE — TELEPHONE ENCOUNTER
Hub to read and relay    Left message via [employer] to schedule medicare wellness after 6/14/2025 and office visit follow ASAP

## 2025-06-01 DIAGNOSIS — M10.9 GOUT, UNSPECIFIED CAUSE, UNSPECIFIED CHRONICITY, UNSPECIFIED SITE: ICD-10-CM

## 2025-06-01 DIAGNOSIS — E11.65 UNCONTROLLED TYPE 2 DIABETES MELLITUS WITH HYPERGLYCEMIA: ICD-10-CM

## 2025-06-01 DIAGNOSIS — I10 PRIMARY HYPERTENSION: ICD-10-CM

## 2025-06-02 RX ORDER — BENAZEPRIL HYDROCHLORIDE 20 MG/1
20 TABLET ORAL DAILY
Qty: 90 TABLET | Refills: 0 | Status: SHIPPED | OUTPATIENT
Start: 2025-06-02

## 2025-06-02 RX ORDER — ALLOPURINOL 300 MG/1
300 TABLET ORAL DAILY
Qty: 90 TABLET | Refills: 0 | Status: SHIPPED | OUTPATIENT
Start: 2025-06-02

## 2025-06-06 ENCOUNTER — ANTICOAGULATION VISIT (OUTPATIENT)
Dept: PHARMACY | Facility: HOSPITAL | Age: 65
End: 2025-06-06
Payer: MEDICARE

## 2025-06-06 DIAGNOSIS — I48.19 ATRIAL FIBRILLATION, PERSISTENT: Primary | ICD-10-CM

## 2025-06-06 LAB
INR PPP: 2.6 (ref 0.91–1.09)
PROTHROMBIN TIME: 31.6 SECONDS (ref 10–13.8)

## 2025-06-06 PROCEDURE — G0463 HOSPITAL OUTPT CLINIC VISIT: HCPCS

## 2025-06-06 PROCEDURE — 85610 PROTHROMBIN TIME: CPT

## 2025-06-06 NOTE — PROGRESS NOTES
Anticoagulation Clinic Progress Note    Anticoagulation Summary  As of 2025      INR goal:  2.0-3.0   TTR:  56.3% (9.8 mo)   INR used for dosin.6 (2025)   Warfarin maintenance plan:  5 mg every e, Sat; 10 mg all other days   Weekly warfarin total:  60 mg   No change documented:  Torrie Pickering   Plan last modified:  Lauri Burdick, PharmD (3/3/2025)   Next INR check:  2025   Target end date:  --    Indications    Atrial fibrillation  persistent [I48.19]                 Anticoagulation Episode Summary       INR check location:  --    Preferred lab:  --    [facility] [place]    Comments:  Lives [address] ([place], [place]).           Anticoagulation Care Providers       Provider Role Specialty Phone number    Nagi Chavez Jr., MD Referring Cardiology [phone]            Clinic Interview:  Patient Findings     Negatives:  Signs/symptoms of thrombosis, Signs/symptoms of bleeding,   Laboratory test error suspected, Change in health, Change in alcohol use,   Change in activity, Upcoming invasive procedure, Emergency department   visit, Upcoming dental procedure, Missed doses, Extra doses, Change in   medications, Change in diet/appetite, Hospital admission, Bruising, Other   complaints    Comments:  Pt reports no changes      Clinical Outcomes     Negatives:  Major bleeding event, Thromboembolic event,   Anticoagulation-related hospital admission, Anticoagulation-related ED   visit, Anticoagulation-related fatality    Comments:  Pt reports no changes        INR History:      3/3/2025     8:30 AM 3/10/2025     8:15 AM 3/24/2025     8:15 AM 2025     2:45 PM 4/15/2025     2:45 PM 2025     3:15 PM 2025     3:15 PM   Anticoagulation Monitoring   INR 1.8 2.0 2.4 2.8 3.2 2.7 2.6   INR Date 3/3/2025 3/10/2025 3/24/2025 2025 4/15/2025 2025 2025   INR Goal 2.0-3.0 2.0-3.0 2.0-3.0 2.0-3.0 2.0-3.0 2.0-3.0 2.0-3.0   Trend Up Same Same Same Same Same  Same   Last Week Total 55 mg 55 mg 60 mg 60 mg 60 mg 60 mg 60 mg   Next Week Total 65 mg 75 mg 60 mg 60 mg 60 mg 60 mg 60 mg   Sun 10 mg 10 mg 10 mg 10 mg 10 mg 10 mg 10 mg   Mon 15 mg (3/3) 20 mg (3/10); Otherwise 10 mg 10 mg 10 mg 10 mg 10 mg 10 mg   Tue 5 mg 5 mg 5 mg 5 mg 5 mg 5 mg 5 mg   Wed 10 mg 10 mg 10 mg 10 mg 10 mg 10 mg 10 mg   Thu 10 mg 10 mg 10 mg 10 mg 10 mg 10 mg 10 mg   Fri 10 mg 10 mg 10 mg 10 mg 10 mg 10 mg 10 mg   Sat 5 mg 10 mg (3/15); Otherwise 5 mg 5 mg 5 mg 5 mg 5 mg 5 mg       Plan:  1. INR is Therapeutic today- see above in Anticoagulation Summary.  Will instruct Chad Davidson Jr. to Continue their warfarin regimen- see above in Anticoagulation Summary.  2. Follow up in 4 weeks  3. Patient declines warfarin refills.  4. Verbal and written information provided. Patient expresses understanding and has no further questions at this time.    Torrie Pickering

## 2025-06-06 NOTE — PROGRESS NOTES
I have supervised and reviewed the notes, assessments, and/or procedures performed. The documented assessment and plan were developed cooperatively, and the plan was implemented in my presence. I concur with the documentation of this patient encounter.    Tamara Harris, [facility]

## 2025-06-08 DIAGNOSIS — I10 PRIMARY HYPERTENSION: ICD-10-CM

## 2025-06-09 RX ORDER — AMLODIPINE BESYLATE 10 MG/1
10 TABLET ORAL DAILY
Qty: 90 TABLET | Refills: 0 | Status: SHIPPED | OUTPATIENT
Start: 2025-06-09

## 2025-07-01 RX ORDER — WARFARIN SODIUM 5 MG/1
TABLET ORAL
Qty: 155 TABLET | Refills: 0 | Status: SHIPPED | OUTPATIENT
Start: 2025-07-01

## 2025-07-14 ENCOUNTER — ANTICOAGULATION VISIT (OUTPATIENT)
Dept: PHARMACY | Facility: HOSPITAL | Age: 65
End: 2025-07-14
Payer: MEDICARE

## 2025-07-14 DIAGNOSIS — I48.19 ATRIAL FIBRILLATION, PERSISTENT: Primary | ICD-10-CM

## 2025-07-14 LAB
INR PPP: 3.1 (ref 0.91–1.09)
PROTHROMBIN TIME: 37 SECONDS (ref 10–13.8)

## 2025-07-14 PROCEDURE — 85610 PROTHROMBIN TIME: CPT

## 2025-07-14 PROCEDURE — G0463 HOSPITAL OUTPT CLINIC VISIT: HCPCS

## 2025-07-14 NOTE — PROGRESS NOTES
Anticoagulation Clinic Progress Note    Anticoagulation Summary  As of 7/14/2025      INR goal:  2.0-3.0   TTR:  59.0% (11.1 mo)   INR used for dosing:  3.1 (7/14/2025)   Warfarin maintenance plan:  5 mg every Tue, Sat; 10 mg all other days   Weekly warfarin total:  60 mg   No change documented:  Kinsey Parra   Plan last modified:  Lauri Burdick, PharmD (3/3/2025)   Next INR check:  8/11/2025   Target end date:  --    Indications    Atrial fibrillation  persistent [I48.19]                 Anticoagulation Episode Summary       INR check location:  --    Preferred lab:  --    Send INR reminders to:  [facility] [place] [facility] [place]    Comments:  Lives [address] ([place], [place]).           Anticoagulation Care Providers       Provider Role Specialty Phone number    Nagi Chavez Jr., MD Referring Cardiology [phone]            Clinic Interview:  Patient Findings     Negatives:  Signs/symptoms of thrombosis, Signs/symptoms of bleeding,   Laboratory test error suspected, Change in health, Change in alcohol use,   Change in activity, Upcoming invasive procedure, Emergency department   visit, Upcoming dental procedure, Missed doses, Extra doses, Change in   medications, Change in diet/appetite, Hospital admission, Bruising, Other   complaints      Clinical Outcomes     Negatives:  Major bleeding event, Thromboembolic event,   Anticoagulation-related hospital admission, Anticoagulation-related ED   visit, Anticoagulation-related fatality        INR History:      3/10/2025     8:15 AM 3/24/2025     8:15 AM 4/7/2025     2:45 PM 4/15/2025     2:45 PM 5/6/2025     3:15 PM 6/6/2025     3:15 PM 7/14/2025     3:30 PM   Anticoagulation Monitoring   INR 2.0 2.4 2.8 3.2 2.7 2.6 3.1   INR Date 3/10/2025 3/24/2025 4/7/2025 4/15/2025 5/6/2025 6/6/2025 7/14/2025   INR Goal 2.0-3.0 2.0-3.0 2.0-3.0 2.0-3.0 2.0-3.0 2.0-3.0 2.0-3.0   Trend Same Same Same Same Same Same Same   Last Week Total 55 mg 60 mg 60 mg 60 mg 60 mg 60 mg  60 mg   Next Week Total 75 mg 60 mg 60 mg 60 mg 60 mg 60 mg 60 mg   Sun 10 mg 10 mg 10 mg 10 mg 10 mg 10 mg 10 mg   Mon 20 mg (3/10); Otherwise 10 mg 10 mg 10 mg 10 mg 10 mg 10 mg 10 mg   Tue 5 mg 5 mg 5 mg 5 mg 5 mg 5 mg 5 mg   Wed 10 mg 10 mg 10 mg 10 mg 10 mg 10 mg 10 mg   Thu 10 mg 10 mg 10 mg 10 mg 10 mg 10 mg 10 mg   Fri 10 mg 10 mg 10 mg 10 mg 10 mg 10 mg 10 mg   Sat 10 mg (3/15); Otherwise 5 mg 5 mg 5 mg 5 mg 5 mg 5 mg 5 mg       Plan:  1. INR is out of range- see above in Anticoagulation Summary.   Will instruct Chad Davidson Jr. to Continue  their warfarin regimen- see above in Anticoagulation Summary.  2. Follow up in 4 weeks.   3. Patient declines warfarin refills.  4. Verbal and written information provided. Patient expresses understanding and has no further questions at this time.    Kinsey Parra

## 2025-08-11 ENCOUNTER — OFFICE VISIT (OUTPATIENT)
Dept: FAMILY MEDICINE CLINIC | Facility: CLINIC | Age: 65
End: 2025-08-11
Payer: MEDICARE

## 2025-08-11 VITALS
DIASTOLIC BLOOD PRESSURE: 64 MMHG | SYSTOLIC BLOOD PRESSURE: 120 MMHG | WEIGHT: 270.6 LBS | BODY MASS INDEX: 35.86 KG/M2 | HEART RATE: 86 BPM | TEMPERATURE: 98.2 F | OXYGEN SATURATION: 98 % | HEIGHT: 73 IN

## 2025-08-11 DIAGNOSIS — G89.29 CHRONIC BILATERAL LOW BACK PAIN WITHOUT SCIATICA: ICD-10-CM

## 2025-08-11 DIAGNOSIS — Z12.5 PROSTATE CANCER SCREENING: ICD-10-CM

## 2025-08-11 DIAGNOSIS — I10 PRIMARY HYPERTENSION: ICD-10-CM

## 2025-08-11 DIAGNOSIS — Z23 ENCOUNTER FOR IMMUNIZATION: ICD-10-CM

## 2025-08-11 DIAGNOSIS — M54.50 CHRONIC BILATERAL LOW BACK PAIN WITHOUT SCIATICA: ICD-10-CM

## 2025-08-11 DIAGNOSIS — M10.9 GOUT, UNSPECIFIED CAUSE, UNSPECIFIED CHRONICITY, UNSPECIFIED SITE: ICD-10-CM

## 2025-08-11 DIAGNOSIS — R80.9 MICROALBUMINURIA: ICD-10-CM

## 2025-08-11 DIAGNOSIS — Z00.00 ENCOUNTER FOR MEDICARE ANNUAL WELLNESS EXAM: Primary | ICD-10-CM

## 2025-08-11 DIAGNOSIS — Z12.11 COLON CANCER SCREENING: ICD-10-CM

## 2025-08-11 DIAGNOSIS — E11.65 UNCONTROLLED TYPE 2 DIABETES MELLITUS WITH HYPERGLYCEMIA: ICD-10-CM

## 2025-08-11 DIAGNOSIS — I48.19 ATRIAL FIBRILLATION, PERSISTENT: ICD-10-CM

## 2025-08-11 DIAGNOSIS — E78.2 MIXED HYPERLIPIDEMIA: ICD-10-CM

## 2025-08-12 ENCOUNTER — RESULTS FOLLOW-UP (OUTPATIENT)
Dept: FAMILY MEDICINE CLINIC | Facility: CLINIC | Age: 65
End: 2025-08-12
Payer: MEDICARE

## 2025-08-12 PROBLEM — M75.81 TENDINITIS OF RIGHT ROTATOR CUFF: Status: ACTIVE | Noted: 2024-11-11

## 2025-08-12 LAB
ALBUMIN SERPL-MCNC: 4.8 G/DL (ref 3.9–4.9)
ALBUMIN/CREAT UR: 276 MG/G CREAT (ref 0–29)
ALP SERPL-CCNC: 95 IU/L (ref 44–121)
ALT SERPL-CCNC: 26 IU/L (ref 0–44)
AST SERPL-CCNC: 22 IU/L (ref 0–40)
BASOPHILS # BLD AUTO: 0.1 X10E3/UL (ref 0–0.2)
BASOPHILS NFR BLD AUTO: 1 %
BILIRUB SERPL-MCNC: 1.2 MG/DL (ref 0–1.2)
BUN SERPL-MCNC: 15 MG/DL (ref 8–27)
BUN/CREAT SERPL: 16 (ref 10–24)
CALCIUM SERPL-MCNC: 9.9 MG/DL (ref 8.6–10.2)
CHLORIDE SERPL-SCNC: 100 MMOL/L (ref 96–106)
CHOLEST SERPL-MCNC: 191 MG/DL (ref 100–199)
CO2 SERPL-SCNC: 19 MMOL/L (ref 20–29)
CREAT SERPL-MCNC: 0.96 MG/DL (ref 0.76–1.27)
CREAT UR-MCNC: 140.9 MG/DL
EGFRCR SERPLBLD CKD-EPI 2021: 88 ML/MIN/1.73
EOSINOPHIL # BLD AUTO: 0.5 X10E3/UL (ref 0–0.4)
EOSINOPHIL NFR BLD AUTO: 6 %
ERYTHROCYTE [DISTWIDTH] IN BLOOD BY AUTOMATED COUNT: 13.6 % (ref 11.6–15.4)
FOLATE SERPL-MCNC: 10.7 NG/ML
GLOBULIN SER CALC-MCNC: 2.3 G/DL (ref 1.5–4.5)
GLUCOSE SERPL-MCNC: 241 MG/DL (ref 70–99)
HBA1C MFR BLD: 11.1 % (ref 4.8–5.6)
HCT VFR BLD AUTO: 48.8 % (ref 37.5–51)
HDLC SERPL-MCNC: 38 MG/DL
HGB BLD-MCNC: 15.8 G/DL (ref 13–17.7)
IMM GRANULOCYTES # BLD AUTO: 0 X10E3/UL (ref 0–0.1)
IMM GRANULOCYTES NFR BLD AUTO: 0 %
LDLC SERPL CALC-MCNC: 111 MG/DL (ref 0–99)
LDLC/HDLC SERPL: 2.9 RATIO (ref 0–3.6)
LYMPHOCYTES # BLD AUTO: 2.1 X10E3/UL (ref 0.7–3.1)
LYMPHOCYTES NFR BLD AUTO: 25 %
MCH RBC QN AUTO: 30.1 PG (ref 26.6–33)
MCHC RBC AUTO-ENTMCNC: 32.4 G/DL (ref 31.5–35.7)
MCV RBC AUTO: 93 FL (ref 79–97)
MICROALBUMIN UR-MCNC: 389.5 UG/ML
MONOCYTES # BLD AUTO: 0.6 X10E3/UL (ref 0.1–0.9)
MONOCYTES NFR BLD AUTO: 7 %
NEUTROPHILS # BLD AUTO: 4.9 X10E3/UL (ref 1.4–7)
NEUTROPHILS NFR BLD AUTO: 61 %
PLATELET # BLD AUTO: 411 X10E3/UL (ref 150–450)
POTASSIUM SERPL-SCNC: 4.8 MMOL/L (ref 3.5–5.2)
PROT SERPL-MCNC: 7.1 G/DL (ref 6–8.5)
PSA SERPL-MCNC: 0.8 NG/ML (ref 0–4)
RBC # BLD AUTO: 5.25 X10E6/UL (ref 4.14–5.8)
SODIUM SERPL-SCNC: 135 MMOL/L (ref 134–144)
TRIGL SERPL-MCNC: 243 MG/DL (ref 0–149)
URATE SERPL-MCNC: 5.5 MG/DL (ref 3.8–8.4)
VIT B12 SERPL-MCNC: 326 PG/ML (ref 232–1245)
VLDLC SERPL CALC-MCNC: 42 MG/DL (ref 5–40)
WBC # BLD AUTO: 8.2 X10E3/UL (ref 3.4–10.8)

## 2025-08-13 DIAGNOSIS — E11.65 UNCONTROLLED TYPE 2 DIABETES MELLITUS WITH HYPERGLYCEMIA: Primary | ICD-10-CM

## 2025-08-18 ENCOUNTER — ANTICOAGULATION VISIT (OUTPATIENT)
Dept: PHARMACY | Facility: HOSPITAL | Age: 65
End: 2025-08-18
Payer: MEDICARE

## 2025-08-18 DIAGNOSIS — I48.19 ATRIAL FIBRILLATION, PERSISTENT: Primary | ICD-10-CM

## 2025-08-18 LAB
INR PPP: 2.9 (ref 0.91–1.09)
PROTHROMBIN TIME: 35.3 SECONDS (ref 10–13.8)

## 2025-08-18 PROCEDURE — G0463 HOSPITAL OUTPT CLINIC VISIT: HCPCS

## 2025-08-18 PROCEDURE — 85610 PROTHROMBIN TIME: CPT

## 2025-08-20 DIAGNOSIS — E11.65 UNCONTROLLED TYPE 2 DIABETES MELLITUS WITH HYPERGLYCEMIA: ICD-10-CM

## 2025-08-20 DIAGNOSIS — M10.9 GOUT, UNSPECIFIED CAUSE, UNSPECIFIED CHRONICITY, UNSPECIFIED SITE: ICD-10-CM

## 2025-08-20 DIAGNOSIS — I10 PRIMARY HYPERTENSION: ICD-10-CM

## 2025-08-20 RX ORDER — AMLODIPINE BESYLATE 10 MG/1
10 TABLET ORAL DAILY
Qty: 90 TABLET | Refills: 1 | Status: SHIPPED | OUTPATIENT
Start: 2025-08-20

## 2025-08-20 RX ORDER — GLIMEPIRIDE 4 MG/1
4 TABLET ORAL 2 TIMES DAILY WITH MEALS
Qty: 180 TABLET | Refills: 1 | Status: SHIPPED | OUTPATIENT
Start: 2025-08-20

## 2025-08-20 RX ORDER — BENAZEPRIL HYDROCHLORIDE 20 MG/1
20 TABLET ORAL DAILY
Qty: 90 TABLET | Refills: 1 | Status: SHIPPED | OUTPATIENT
Start: 2025-08-20

## 2025-08-20 RX ORDER — HYDROCHLOROTHIAZIDE 12.5 MG/1
12.5 CAPSULE ORAL EVERY MORNING
Qty: 90 CAPSULE | Refills: 1 | Status: SHIPPED | OUTPATIENT
Start: 2025-08-20

## 2025-08-20 RX ORDER — ALLOPURINOL 300 MG/1
300 TABLET ORAL DAILY
Qty: 90 TABLET | Refills: 1 | Status: SHIPPED | OUTPATIENT
Start: 2025-08-20

## (undated) DEVICE — OCTA,PERSEID,2-2-2-2-2,F-CURVE: Brand: OCTARAY MAPPING CATHETER

## (undated) DEVICE — PINNACLE INTRODUCER SHEATH: Brand: PINNACLE

## (undated) DEVICE — PROB S-CATH TEMP ESOPH 10F

## (undated) DEVICE — Device: Brand: VIZIGO

## (undated) DEVICE — Device: Brand: SOUNDSTAR

## (undated) DEVICE — 1 X VERSACROSS TRANSSEPTAL SHEATH (INCLUDING  1 X J-TIP GUIDEWIRE); 1 X VERSACROSS RF WIRE (INCLUDING 1 X CONNECTOR CABLE (SINGLE USE)); 1 X DISPERSIVE ELECTRODE: Brand: VERSACROSS ACCESS SOLUTION

## (undated) DEVICE — BI-DIRECTIONAL NAVIGATION CATHETER, NAV, D-F: Brand: QDOT MICRO

## (undated) DEVICE — SYS CLS VASC/VENI VASCADE MVP 6TO12F

## (undated) DEVICE — LOU EP: Brand: MEDLINE INDUSTRIES, INC.

## (undated) DEVICE — Device: Brand: DECANAV

## (undated) DEVICE — Device: Brand: SMARTABLATE

## (undated) DEVICE — Device: Brand: REFERENCE PATCH CARTO 3